# Patient Record
Sex: FEMALE | Race: WHITE | NOT HISPANIC OR LATINO | Employment: PART TIME | URBAN - METROPOLITAN AREA
[De-identification: names, ages, dates, MRNs, and addresses within clinical notes are randomized per-mention and may not be internally consistent; named-entity substitution may affect disease eponyms.]

---

## 2023-03-17 PROBLEM — L30.9 ECZEMA: Status: ACTIVE | Noted: 2023-03-17

## 2023-03-17 PROBLEM — R42 VERTIGO: Status: ACTIVE | Noted: 2023-03-17

## 2023-03-17 PROBLEM — M70.51 BURSITIS OF RIGHT KNEE: Status: ACTIVE | Noted: 2023-03-17

## 2023-03-17 PROBLEM — L40.9 PSORIASIS: Status: ACTIVE | Noted: 2023-03-17

## 2023-03-17 PROBLEM — K76.9 HEPATIC LESION: Status: ACTIVE | Noted: 2023-03-17

## 2023-03-17 PROBLEM — I10 HYPERTENSION: Status: ACTIVE | Noted: 2023-03-17

## 2023-03-17 PROBLEM — R76.12 POSITIVE QUANTIFERON-TB GOLD TEST: Status: ACTIVE | Noted: 2023-03-17

## 2023-03-17 PROBLEM — R53.81 MALAISE AND FATIGUE: Status: ACTIVE | Noted: 2023-03-17

## 2023-03-17 PROBLEM — K29.70 GASTRITIS: Status: ACTIVE | Noted: 2023-03-17

## 2023-03-17 PROBLEM — H66.90 OTITIS MEDIA: Status: ACTIVE | Noted: 2023-03-17

## 2023-03-17 PROBLEM — D70.9 NEUTROPENIA (CMS-HCC): Status: ACTIVE | Noted: 2023-03-17

## 2023-03-17 PROBLEM — R09.02 HYPOXEMIA: Status: ACTIVE | Noted: 2023-03-17

## 2023-03-17 PROBLEM — K21.9 GERD (GASTROESOPHAGEAL REFLUX DISEASE): Status: ACTIVE | Noted: 2023-03-17

## 2023-03-17 PROBLEM — J34.9 SINUS DISEASE: Status: ACTIVE | Noted: 2023-03-17

## 2023-03-17 PROBLEM — M72.2 PLANTAR FASCIITIS: Status: ACTIVE | Noted: 2023-03-17

## 2023-03-17 PROBLEM — H91.90 HEARING LOSS: Status: ACTIVE | Noted: 2023-03-17

## 2023-03-17 PROBLEM — E16.2 HYPOGLYCEMIA: Status: ACTIVE | Noted: 2023-03-17

## 2023-03-17 PROBLEM — I87.323 CHRONIC VENOUS HYPERTENSION WITH INFLAMMATION INVOLVING BOTH SIDES: Status: ACTIVE | Noted: 2023-03-17

## 2023-03-17 PROBLEM — S43.82XA: Status: ACTIVE | Noted: 2023-03-17

## 2023-03-17 PROBLEM — M65.331 TRIGGER MIDDLE FINGER OF RIGHT HAND: Status: ACTIVE | Noted: 2023-03-17

## 2023-03-17 PROBLEM — R10.9 RIGHT FLANK PAIN: Status: ACTIVE | Noted: 2023-03-17

## 2023-03-17 PROBLEM — G43.909 MIGRAINE HEADACHE: Status: ACTIVE | Noted: 2023-03-17

## 2023-03-17 PROBLEM — H90.3 ASYMMETRICAL SENSORINEURAL HEARING LOSS: Status: ACTIVE | Noted: 2023-03-17

## 2023-03-17 PROBLEM — M77.8 TENDONITIS OF RIGHT HAND: Status: ACTIVE | Noted: 2023-03-17

## 2023-03-17 PROBLEM — I49.9 CARDIAC ARRHYTHMIA: Status: ACTIVE | Noted: 2023-03-17

## 2023-03-17 PROBLEM — J02.9 SORE THROAT: Status: ACTIVE | Noted: 2023-03-17

## 2023-03-17 PROBLEM — Z86.79 HISTORY OF ATRIAL FIBRILLATION: Status: ACTIVE | Noted: 2023-03-17

## 2023-03-17 PROBLEM — R10.9 ABDOMINAL PAIN: Status: ACTIVE | Noted: 2023-03-17

## 2023-03-17 PROBLEM — D64.9 ANEMIA: Status: ACTIVE | Noted: 2023-03-17

## 2023-03-17 PROBLEM — R39.89 POSSIBLE URINARY TRACT INFECTION: Status: ACTIVE | Noted: 2023-03-17

## 2023-03-17 PROBLEM — R10.13 EPIGASTRIC PAIN: Status: ACTIVE | Noted: 2023-03-17

## 2023-03-17 PROBLEM — F32.A ANXIETY AND DEPRESSION: Status: ACTIVE | Noted: 2023-03-17

## 2023-03-17 PROBLEM — M79.644 PAIN IN FINGER OF RIGHT HAND: Status: ACTIVE | Noted: 2023-03-17

## 2023-03-17 PROBLEM — E66.811 CLASS 1 OBESITY WITH BODY MASS INDEX (BMI) OF 34.0 TO 34.9 IN ADULT: Status: ACTIVE | Noted: 2023-03-17

## 2023-03-17 PROBLEM — M65.331 ACQUIRED TRIGGER FINGER OF RIGHT MIDDLE FINGER: Status: ACTIVE | Noted: 2023-03-17

## 2023-03-17 PROBLEM — I87.2 VENOUS STASIS DERMATITIS OF RIGHT LOWER EXTREMITY: Status: ACTIVE | Noted: 2023-03-17

## 2023-03-17 PROBLEM — E88.89 STEATOSIS (MULTI): Status: ACTIVE | Noted: 2023-03-17

## 2023-03-17 PROBLEM — M54.16 LUMBAR RADICULOPATHY: Status: ACTIVE | Noted: 2023-03-17

## 2023-03-17 PROBLEM — R53.83 FATIGUE: Status: ACTIVE | Noted: 2023-03-17

## 2023-03-17 PROBLEM — R41.3 MEMORY CHANGES: Status: ACTIVE | Noted: 2023-03-17

## 2023-03-17 PROBLEM — M17.12 OSTEOARTHRITIS OF LEFT KNEE: Status: ACTIVE | Noted: 2023-03-17

## 2023-03-17 PROBLEM — H81.09 MENIERE SYNDROME: Status: ACTIVE | Noted: 2023-03-17

## 2023-03-17 PROBLEM — M54.10: Status: ACTIVE | Noted: 2023-03-17

## 2023-03-17 PROBLEM — E66.9 CLASS 1 OBESITY WITH BODY MASS INDEX (BMI) OF 34.0 TO 34.9 IN ADULT: Status: ACTIVE | Noted: 2023-03-17

## 2023-03-17 PROBLEM — I87.2 CHRONIC STASIS DERMATITIS: Status: ACTIVE | Noted: 2023-03-17

## 2023-03-17 PROBLEM — F41.9 ANXIETY AND DEPRESSION: Status: ACTIVE | Noted: 2023-03-17

## 2023-03-17 PROBLEM — E78.5 HYPERLIPIDEMIA: Status: ACTIVE | Noted: 2023-03-17

## 2023-03-17 PROBLEM — R53.83 MALAISE AND FATIGUE: Status: ACTIVE | Noted: 2023-03-17

## 2023-03-17 PROBLEM — F41.8 ANTICIPATORY ANXIETY: Status: ACTIVE | Noted: 2023-03-17

## 2023-03-17 PROBLEM — K30 INDIGESTION: Status: ACTIVE | Noted: 2023-03-17

## 2023-03-17 PROBLEM — K76.0 FATTY LIVER DISEASE, NONALCOHOLIC: Status: ACTIVE | Noted: 2023-03-17

## 2023-03-17 PROBLEM — R60.0 EDEMA OF LEFT FOOT: Status: ACTIVE | Noted: 2023-03-17

## 2023-03-17 PROBLEM — I51.7 ENLARGEMENT OF RIGHT ATRIUM: Status: ACTIVE | Noted: 2023-03-17

## 2023-03-17 PROBLEM — E55.9 VITAMIN D DEFICIENCY: Status: ACTIVE | Noted: 2023-03-17

## 2023-03-17 PROBLEM — M81.0 OSTEOPOROSIS: Status: ACTIVE | Noted: 2023-03-17

## 2023-03-17 PROBLEM — D17.1 LIPOMA OF ABDOMINAL WALL: Status: ACTIVE | Noted: 2023-03-17

## 2023-03-17 PROBLEM — R60.0 BILATERAL LEG EDEMA: Status: ACTIVE | Noted: 2023-03-17

## 2023-03-17 PROBLEM — I86.8 VARICOSE VEINS OF OTHER SPECIFIED SITES: Status: ACTIVE | Noted: 2023-03-17

## 2023-03-17 PROBLEM — M53.3 PAIN OF RIGHT SACROILIAC JOINT: Status: ACTIVE | Noted: 2023-03-17

## 2023-03-17 PROBLEM — L01.00 IMPETIGO: Status: ACTIVE | Noted: 2023-03-17

## 2023-03-17 RX ORDER — ACETAMINOPHEN 500 MG
1 TABLET ORAL DAILY
COMMUNITY

## 2023-03-17 RX ORDER — PHENOL/SODIUM PHENOLATE
20 AEROSOL, SPRAY (ML) MUCOUS MEMBRANE DAILY
COMMUNITY

## 2023-03-20 ENCOUNTER — TELEMEDICINE (OUTPATIENT)
Dept: PRIMARY CARE | Facility: CLINIC | Age: 73
End: 2023-03-20
Payer: MEDICARE

## 2023-03-20 DIAGNOSIS — J02.9 SORE THROAT: Primary | ICD-10-CM

## 2023-03-20 PROCEDURE — 99212 OFFICE O/P EST SF 10 MIN: CPT | Performed by: INTERNAL MEDICINE

## 2023-03-20 RX ORDER — AZITHROMYCIN 250 MG/1
TABLET, FILM COATED ORAL
Qty: 6 TABLET | Refills: 0 | Status: SHIPPED | OUTPATIENT
Start: 2023-03-20 | End: 2023-03-25

## 2023-03-20 ASSESSMENT — ENCOUNTER SYMPTOMS
TROUBLE SWALLOWING: 1
SINUS PAIN: 1
SINUS PRESSURE: 1
ACTIVITY CHANGE: 0
SORE THROAT: 1
COUGH: 1

## 2023-03-20 NOTE — PROGRESS NOTES
Subjective     Patient is evaluated Via Telemedicine.  Chief complaint is sore throat, runny nose, fatigue and malaise.  Has been sick for less than 1 week.    HPI  This is a 72 years old Brazilian-speaking lady with medical history of hyperlipidemia, degenerative joint disease, gastroesophageal reflux disease, hearing loss, carpal tunnel syndrome, psoriasis, history of latent TB, treated with INH.  Patient is evaluated via telemedicine.  Has been having fever, chills.  Patient's daughter has been sick also.  Has cough, sore throat.  Patient started to have nasal discharge yellow color, congestion.     Has H of  latent TB in February 2022, treated with INH.      Review of Systems   Constitutional:  Negative for activity change.   HENT:  Positive for postnasal drip, sinus pressure, sinus pain, sore throat and trouble swallowing.    Respiratory:  Positive for cough.        Objective      There were no vitals filed for this visit.     Physical Exam  There are no diagnoses linked to this encounter.        - Sinusitis.  Take Tylenol as needed.  Plenty of fluids.  Start on Z pack.    -Hyperlipidemia.  Keep Mediterranean diet, exercise, weight loss.  Repeat lipid panel today.  Last CT cardiac score 2017, was 2.    -Gastroesophageal reflux disease.  Eat small portions.  Avoid Caffeine, spicy foods, chocolate, alcohol.  Do not wear tight clothes.  Keep last meal before bed time > 3 hours.  Keep head side of the bed elevated at all time.    - Hyperlipidemia.  Diet, exercise.  Keep Mediterranean diet.    -Degenerative joint disease.  Arthralgia.  Take Tylenol as needed.  Exercise.    -History of latent TB.  Treated with INH.    -Health maintenance.  Return for Medicare wellness annual exam.  Blood work is today.  Mammography, colonoscopy.  See ophthalmology.    - Class 1 Obesity with 34.11.  Diet, exercise.  Keep ideal BMI less than 25.    This visit was completed via Telemedicine due to the restrictions of the COVID -19  pandemic.  All issues as above were discussed and addressed, but no physical exam was performed.  If it was felt, that the patient should be evaluated in clinic, then they were directed there.  The patient verbally consented to visit.       Total time spent with patient is greater than 10 minutes, more than 50% of the time is spent in direct patient telemedicine, patient consultation and coordination of care.  Patient voiced a full understanding of all treatment plans.  All patient questions has been answered to patient's satisfaction.          Teresa Swenson MD

## 2023-05-19 ENCOUNTER — APPOINTMENT (OUTPATIENT)
Dept: PRIMARY CARE | Facility: CLINIC | Age: 73
End: 2023-05-19
Payer: MEDICARE

## 2023-05-22 ENCOUNTER — OFFICE VISIT (OUTPATIENT)
Dept: PRIMARY CARE | Facility: CLINIC | Age: 73
End: 2023-05-22
Payer: MEDICARE

## 2023-05-22 VITALS
SYSTOLIC BLOOD PRESSURE: 122 MMHG | TEMPERATURE: 96.8 F | RESPIRATION RATE: 16 BRPM | WEIGHT: 205 LBS | BODY MASS INDEX: 34.16 KG/M2 | HEIGHT: 65 IN | DIASTOLIC BLOOD PRESSURE: 62 MMHG | HEART RATE: 62 BPM

## 2023-05-22 DIAGNOSIS — R05.9 COUGH, UNSPECIFIED TYPE: Primary | ICD-10-CM

## 2023-05-22 PROCEDURE — 1160F RVW MEDS BY RX/DR IN RCRD: CPT | Performed by: INTERNAL MEDICINE

## 2023-05-22 PROCEDURE — 99212 OFFICE O/P EST SF 10 MIN: CPT | Performed by: INTERNAL MEDICINE

## 2023-05-22 PROCEDURE — 1036F TOBACCO NON-USER: CPT | Performed by: INTERNAL MEDICINE

## 2023-05-22 PROCEDURE — 1159F MED LIST DOCD IN RCRD: CPT | Performed by: INTERNAL MEDICINE

## 2023-05-22 PROCEDURE — 3074F SYST BP LT 130 MM HG: CPT | Performed by: INTERNAL MEDICINE

## 2023-05-22 PROCEDURE — 3078F DIAST BP <80 MM HG: CPT | Performed by: INTERNAL MEDICINE

## 2023-05-22 RX ORDER — DOXYCYCLINE 100 MG/1
100 CAPSULE ORAL 2 TIMES DAILY
Qty: 14 CAPSULE | Refills: 0 | Status: SHIPPED | OUTPATIENT
Start: 2023-05-22 | End: 2023-05-29

## 2023-05-22 ASSESSMENT — ENCOUNTER SYMPTOMS
CHEST TIGHTNESS: 0
APNEA: 0
LOSS OF SENSATION IN FEET: 0
SHORTNESS OF BREATH: 0
APPETITE CHANGE: 0
OCCASIONAL FEELINGS OF UNSTEADINESS: 0
ACTIVITY CHANGE: 0
COUGH: 1
DEPRESSION: 0
FATIGUE: 1
SORE THROAT: 1

## 2023-05-22 ASSESSMENT — PATIENT HEALTH QUESTIONNAIRE - PHQ9
SUM OF ALL RESPONSES TO PHQ9 QUESTIONS 1 AND 2: 0
1. LITTLE INTEREST OR PLEASURE IN DOING THINGS: NOT AT ALL
2. FEELING DOWN, DEPRESSED OR HOPELESS: NOT AT ALL

## 2023-05-22 ASSESSMENT — COLUMBIA-SUICIDE SEVERITY RATING SCALE - C-SSRS
2. HAVE YOU ACTUALLY HAD ANY THOUGHTS OF KILLING YOURSELF?: NO
6. HAVE YOU EVER DONE ANYTHING, STARTED TO DO ANYTHING, OR PREPARED TO DO ANYTHING TO END YOUR LIFE?: NO
1. IN THE PAST MONTH, HAVE YOU WISHED YOU WERE DEAD OR WISHED YOU COULD GO TO SLEEP AND NOT WAKE UP?: NO

## 2023-05-22 ASSESSMENT — PAIN SCALES - GENERAL: PAINLEVEL: 0-NO PAIN

## 2023-05-22 NOTE — PROGRESS NOTES
Subjective    Blanquita Morales is a 72 y.o. female who presents for Chief complaint of cough.  Has cough with green sputum production.    HPI    This is a 72 years old Jordanian-speaking lady with medical history of hyperlipidemia, degenerative joint disease, gastroesophageal reflux disease, hearing loss, carpal tunnel syndrome, psoriasis, history of latent TB, treated with INH.  Patient is  presented for evaluation and treatment of the above complaints.  Has been having sore throat, cough with green sputum production.  Patient has been sick for 1 month.  Has had fever, chills episodes.  Denies to have chest pain, Sob, dyspnea. Headaches.   Has H of  latent TB in February 2022, treated with INH.    Review of Systems   Constitutional:  Positive for fatigue. Negative for activity change and appetite change.   HENT:  Positive for sore throat.    Respiratory:  Positive for cough. Negative for apnea, chest tightness and shortness of breath.        Objective        Vitals:    05/22/23 1609   Temp: 36 °C (96.8 °F)        Physical Exam  Constitutional:       Appearance: Normal appearance.   HENT:      Head: Normocephalic.      Nose: Nose normal.      Mouth/Throat:      Mouth: Mucous membranes are dry.   Cardiovascular:      Rate and Rhythm: Normal rate and regular rhythm.   Pulmonary:      Effort: Pulmonary effort is normal.      Breath sounds: Normal breath sounds.   Abdominal:      Palpations: Abdomen is soft.   Musculoskeletal:         General: Normal range of motion.   Skin:     General: Skin is warm.   Neurological:      Mental Status: She is alert and oriented to person, place, and time.   Psychiatric:         Mood and Affect: Mood normal.       Diagnoses and all orders for this visit:  Cough, unspecified type (Primary)  -     doxycycline (Vibramycin) 100 mg capsule; Take 1 capsule (100 mg) by mouth 2 times a day for 7 days. Take with at least 8 ounces (large glass) of water, do not lie down for 30 minutes after  -     XR  chest 2 views; Future      -Hyperlipidemia.  Keep Mediterranean diet, exercise, weight loss.  Repeat lipid panel today.  Last CT cardiac score 2017, was 2.     -Gastroesophageal reflux disease.  Eat small portions.  Avoid Caffeine, spicy foods, chocolate, alcohol.  Do not wear tight clothes.  Keep last meal before bed time > 3 hours.  Keep head side of the bed elevated at all time.     - Hyperlipidemia.  Diet, exercise.  Keep Mediterranean diet.     -Degenerative joint disease.  Arthralgia.  Take Tylenol as needed.  Exercise.     -History of latent TB.  Treated with INH.     -Health maintenance.  Return for Medicare wellness annual exam.   Declined to have DEXA scan, colonoscopy.   Due to mammography.     - Class 1 Obesity with 34.11.  Diet, exercise.  Keep ideal BMI less than 25.      Teresa Swenson MD

## 2023-06-14 ENCOUNTER — APPOINTMENT (OUTPATIENT)
Dept: PRIMARY CARE | Facility: CLINIC | Age: 73
End: 2023-06-14
Payer: MEDICARE

## 2023-07-17 ENCOUNTER — OFFICE VISIT (OUTPATIENT)
Dept: PRIMARY CARE | Facility: CLINIC | Age: 73
End: 2023-07-17
Payer: MEDICARE

## 2023-07-17 VITALS
BODY MASS INDEX: 33.32 KG/M2 | WEIGHT: 200 LBS | HEART RATE: 62 BPM | SYSTOLIC BLOOD PRESSURE: 118 MMHG | HEIGHT: 65 IN | TEMPERATURE: 97.5 F | DIASTOLIC BLOOD PRESSURE: 76 MMHG | RESPIRATION RATE: 16 BRPM

## 2023-07-17 DIAGNOSIS — R39.15 URGENCY OF URINATION: Primary | ICD-10-CM

## 2023-07-17 LAB
APPEARANCE UR: CLEAR
BILIRUB UR QL STRIP: NEGATIVE
COLOR UR: YELLOW
GLUCOSE UR STRIP-MCNC: NEGATIVE MG/DL
HGB UR QL STRIP: ABNORMAL
KETONES UR STRIP-MCNC: ABNORMAL MG/DL
LEUKOCYTE ESTERASE UR QL STRIP: ABNORMAL
NITRITE UR QL STRIP: POSITIVE
PH UR STRIP: 5.5 [PH]
PROT UR STRIP-MCNC: ABNORMAL MG/DL
SP GR UR STRIP.AUTO: >=1.03
UROBILINOGEN UR STRIP-ACNC: 0.2 E.U./DL

## 2023-07-17 PROCEDURE — 81003 URINALYSIS AUTO W/O SCOPE: CPT | Performed by: INTERNAL MEDICINE

## 2023-07-17 PROCEDURE — 1159F MED LIST DOCD IN RCRD: CPT | Performed by: INTERNAL MEDICINE

## 2023-07-17 PROCEDURE — 3074F SYST BP LT 130 MM HG: CPT | Performed by: INTERNAL MEDICINE

## 2023-07-17 PROCEDURE — 99212 OFFICE O/P EST SF 10 MIN: CPT | Performed by: INTERNAL MEDICINE

## 2023-07-17 PROCEDURE — 1160F RVW MEDS BY RX/DR IN RCRD: CPT | Performed by: INTERNAL MEDICINE

## 2023-07-17 PROCEDURE — 87086 URINE CULTURE/COLONY COUNT: CPT

## 2023-07-17 PROCEDURE — 1126F AMNT PAIN NOTED NONE PRSNT: CPT | Performed by: INTERNAL MEDICINE

## 2023-07-17 PROCEDURE — 1036F TOBACCO NON-USER: CPT | Performed by: INTERNAL MEDICINE

## 2023-07-17 PROCEDURE — 3078F DIAST BP <80 MM HG: CPT | Performed by: INTERNAL MEDICINE

## 2023-07-17 PROCEDURE — 87186 SC STD MICRODIL/AGAR DIL: CPT

## 2023-07-17 PROCEDURE — 87077 CULTURE AEROBIC IDENTIFY: CPT

## 2023-07-17 RX ORDER — CIPROFLOXACIN 500 MG/1
500 TABLET ORAL 2 TIMES DAILY
Qty: 14 TABLET | Refills: 0 | Status: SHIPPED | OUTPATIENT
Start: 2023-07-17 | End: 2023-07-28 | Stop reason: WASHOUT

## 2023-07-17 ASSESSMENT — PAIN SCALES - GENERAL: PAINLEVEL: 0-NO PAIN

## 2023-07-17 ASSESSMENT — ENCOUNTER SYMPTOMS
FREQUENCY: 1
DYSURIA: 1
ACTIVITY CHANGE: 1
DIFFICULTY URINATING: 1

## 2023-07-17 NOTE — PROGRESS NOTES
Subjective    Blanquita Morales is a 73 y.o. female who presents for  urgency, frequency.  Had fever.    HPI    This is a 73 years old [place]-speaking lady with medical history of hyperlipidemia, degenerative joint disease, gastroesophageal reflux disease, hearing loss, carpal tunnel syndrome, psoriasis, history of latent TB, treated with INH.  Patient is  presented for evaluation and treatment of the above complaints.   Developed urgency, frequency, dysuria.    Denies to have chest pain, Sob, dyspnea. Headaches.   Has H of  latent TB in February 2022, treated with INH.    Review of Systems   Constitutional:  Positive for activity change.   Genitourinary:  Positive for difficulty urinating, dysuria and frequency.       Objective        Vitals:    07/17/23 1012   BP: 118/76   Pulse: 62   Resp: 16   Temp: 36.4 °C (97.5 °F)        Physical Exam  HENT:      Head: Normocephalic.      Nose: Nose normal.      Mouth/Throat:      Mouth: Mucous membranes are moist.   Cardiovascular:      Rate and Rhythm: Normal rate.   Pulmonary:      Effort: Pulmonary effort is normal.   Abdominal:      Palpations: Abdomen is soft.   Musculoskeletal:         General: Normal range of motion.   Skin:     General: Skin is warm.   Neurological:      General: No focal deficit present.      Mental Status: She is alert.       Diagnoses and all orders for this visit:  Urgency of urination (Primary)  -     POCT UA (Automated) docked device  -     POCT UA (Automated) docked device  -     ciprofloxacin (Cipro) 500 mg tablet; Take 1 tablet (500 mg) by mouth 2 times a day for 7 days.  -     Urine Culture  Other orders  -     POCT URINALYSIS AUTOMATED       Hyperlipidemia.  Keep Mediterranean diet, exercise, weight loss.  Repeat lipid panel today.  Last CT cardiac score 2017, was 2.     -Gastroesophageal reflux disease.  Eat small portions.  Avoid Caffeine, spicy foods, chocolate, alcohol.  Do not wear tight clothes.  Keep last meal before bed time > 3  hours.  Keep head side of the bed elevated at all time.     - Hyperlipidemia.  Diet, exercise.  Keep Mediterranean diet.     -Degenerative joint disease.  Arthralgia.  Take Tylenol as needed.  Exercise.     -History of latent TB.  Treated with INH.     -Health maintenance.  Return for Medicare wellness annual exam.   Declined to have DEXA scan, colonoscopy.   Due to mammography.     - Class 1 Obesity with 33.28.  Diet, exercise.  Keep ideal BMI less than 25.      Teresa Swenson MD

## 2023-07-19 LAB — URINE CULTURE: ABNORMAL

## 2023-07-24 ENCOUNTER — APPOINTMENT (OUTPATIENT)
Dept: PRIMARY CARE | Facility: CLINIC | Age: 73
End: 2023-07-24
Payer: MEDICARE

## 2023-07-28 ENCOUNTER — OFFICE VISIT (OUTPATIENT)
Dept: PRIMARY CARE | Facility: CLINIC | Age: 73
End: 2023-07-28
Payer: MEDICARE

## 2023-07-28 VITALS
WEIGHT: 202 LBS | RESPIRATION RATE: 16 BRPM | HEIGHT: 65 IN | SYSTOLIC BLOOD PRESSURE: 102 MMHG | DIASTOLIC BLOOD PRESSURE: 74 MMHG | HEART RATE: 62 BPM | BODY MASS INDEX: 33.66 KG/M2 | TEMPERATURE: 97.5 F

## 2023-07-28 DIAGNOSIS — Z12.11 ENCOUNTER FOR SCREENING FOR MALIGNANT NEOPLASM OF COLON: ICD-10-CM

## 2023-07-28 DIAGNOSIS — Z00.00 ROUTINE GENERAL MEDICAL EXAMINATION AT HEALTH CARE FACILITY: Primary | ICD-10-CM

## 2023-07-28 DIAGNOSIS — Z12.31 ENCOUNTER FOR SCREENING MAMMOGRAM FOR MALIGNANT NEOPLASM OF BREAST: ICD-10-CM

## 2023-07-28 DIAGNOSIS — Z78.0 ASYMPTOMATIC MENOPAUSAL STATE: ICD-10-CM

## 2023-07-28 PROCEDURE — 1160F RVW MEDS BY RX/DR IN RCRD: CPT | Performed by: INTERNAL MEDICINE

## 2023-07-28 PROCEDURE — 3078F DIAST BP <80 MM HG: CPT | Performed by: INTERNAL MEDICINE

## 2023-07-28 PROCEDURE — 1159F MED LIST DOCD IN RCRD: CPT | Performed by: INTERNAL MEDICINE

## 2023-07-28 PROCEDURE — 3074F SYST BP LT 130 MM HG: CPT | Performed by: INTERNAL MEDICINE

## 2023-07-28 PROCEDURE — 1036F TOBACCO NON-USER: CPT | Performed by: INTERNAL MEDICINE

## 2023-07-28 PROCEDURE — G0439 PPPS, SUBSEQ VISIT: HCPCS | Performed by: INTERNAL MEDICINE

## 2023-07-28 PROCEDURE — 1126F AMNT PAIN NOTED NONE PRSNT: CPT | Performed by: INTERNAL MEDICINE

## 2023-07-28 PROCEDURE — 1170F FXNL STATUS ASSESSED: CPT | Performed by: INTERNAL MEDICINE

## 2023-07-28 ASSESSMENT — ACTIVITIES OF DAILY LIVING (ADL)
FEEDING YOURSELF: INDEPENDENT
USING TELEPHONE: INDEPENDENT
WALKS IN HOME: INDEPENDENT
TOILETING: INDEPENDENT
PREPARING MEALS: INDEPENDENT
HEARING - RIGHT EAR: HEARING AID
USING TRANSPORTATION: INDEPENDENT
GROOMING: INDEPENDENT
EATING: INDEPENDENT
JUDGMENT_ADEQUATE_SAFELY_COMPLETE_DAILY_ACTIVITIES: YES
HEARING - LEFT EAR: HEARING AID
ADEQUATE_TO_COMPLETE_ADL: YES
STIL DRIVING: YES
ADEQUATE_TO_COMPLETE_ADL: YES
BATHING: INDEPENDENT
JUDGMENT_ADEQUATE_SAFELY_COMPLETE_DAILY_ACTIVITIES: YES
BATHING: INDEPENDENT
TAKING MEDICATION: INDEPENDENT
PILL BOX USED: YES
GROCERY SHOPPING: INDEPENDENT
DOING HOUSEWORK: INDEPENDENT
DRESSING YOURSELF: INDEPENDENT
PATIENT'S MEMORY ADEQUATE TO SAFELY COMPLETE DAILY ACTIVITIES?: YES
NEEDS ASSISTANCE WITH FOOD: INDEPENDENT
DRESSING: INDEPENDENT
TOILETING: INDEPENDENT
MANAGING FINANCES: INDEPENDENT
FEEDING: INDEPENDENT

## 2023-07-28 ASSESSMENT — ENCOUNTER SYMPTOMS
BACK PAIN: 0
HEADACHES: 0
LOSS OF SENSATION IN FEET: 0
ACTIVITY CHANGE: 0
OCCASIONAL FEELINGS OF UNSTEADINESS: 0
LIGHT-HEADEDNESS: 0
ARTHRALGIAS: 1
APPETITE CHANGE: 0
ABDOMINAL DISTENTION: 0
DEPRESSION: 0

## 2023-07-28 ASSESSMENT — COLUMBIA-SUICIDE SEVERITY RATING SCALE - C-SSRS
1. IN THE PAST MONTH, HAVE YOU WISHED YOU WERE DEAD OR WISHED YOU COULD GO TO SLEEP AND NOT WAKE UP?: NO
6. HAVE YOU EVER DONE ANYTHING, STARTED TO DO ANYTHING, OR PREPARED TO DO ANYTHING TO END YOUR LIFE?: NO
2. HAVE YOU ACTUALLY HAD ANY THOUGHTS OF KILLING YOURSELF?: NO

## 2023-07-28 ASSESSMENT — ANXIETY QUESTIONNAIRES
1. FEELING NERVOUS, ANXIOUS, OR ON EDGE: NOT AT ALL
6. BECOMING EASILY ANNOYED OR IRRITABLE: NOT AT ALL
IF YOU CHECKED OFF ANY PROBLEMS ON THIS QUESTIONNAIRE, HOW DIFFICULT HAVE THESE PROBLEMS MADE IT FOR YOU TO DO YOUR WORK, TAKE CARE OF THINGS AT HOME, OR GET ALONG WITH OTHER PEOPLE: SOMEWHAT DIFFICULT
2. NOT BEING ABLE TO STOP OR CONTROL WORRYING: NOT AT ALL
5. BEING SO RESTLESS THAT IT IS HARD TO SIT STILL: NOT AT ALL
GAD7 TOTAL SCORE: 0
7. FEELING AFRAID AS IF SOMETHING AWFUL MIGHT HAPPEN: NOT AT ALL
3. WORRYING TOO MUCH ABOUT DIFFERENT THINGS: NOT AT ALL
4. TROUBLE RELAXING: NOT AT ALL

## 2023-07-28 ASSESSMENT — PATIENT HEALTH QUESTIONNAIRE - PHQ9
SUM OF ALL RESPONSES TO PHQ9 QUESTIONS 1 AND 2: 0
2. FEELING DOWN, DEPRESSED OR HOPELESS: NOT AT ALL
1. LITTLE INTEREST OR PLEASURE IN DOING THINGS: NOT AT ALL

## 2023-07-28 ASSESSMENT — GERIATRIC MINI NUTRITIONAL ASSESSMENT (MNA)
B WEIGHT LOSS DURING THE LAST 3 MONTHS: NO WEIGHT LOSS
A HAS FOOD INTAKE DECLINED OVER THE PAST 3 MONTHS DUE TO LOSS OF APPETITE, DIGESTIVE PROBLEMS, CHEWING OR SWALLOWING DIFFICULTIES?: NO DECREASE IN FOOD INTAKE
E NEUROPSYCHOLOGICAL PROBLEMS: NO PSYCHOLOGICAL PROBLEMS

## 2023-07-28 NOTE — PROGRESS NOTES
Subjective    Blanquita Morales is a 73 y.o. female who presents for  Medicare Wellness Annual Exam.    HPI    This is a 73 years old Czech-speaking lady with medical history of hyperlipidemia, degenerative joint disease, gastroesophageal reflux disease, hearing loss, carpal tunnel syndrome, psoriasis, history of latent TB, treated with INH.  Patient is  presented for Medicare Wellness exam.  Recovered from UTI .  Feeling much better.   Has H of  latent TB in February 2022, treated with INH.    Review of Systems   Constitutional:  Negative for activity change and appetite change.   Gastrointestinal:  Negative for abdominal distention.   Musculoskeletal:  Positive for arthralgias. Negative for back pain.   Neurological:  Negative for light-headedness and headaches.       Objective        Vitals:    07/28/23 0926   BP: 102/74   Pulse: 62   Resp: 16   Temp: 36.4 °C (97.5 °F)        Physical Exam  Constitutional:       Appearance: Normal appearance.   HENT:      Head: Normocephalic and atraumatic.      Right Ear: External ear normal.      Left Ear: External ear normal.      Nose: Nose normal.      Mouth/Throat:      Mouth: Mucous membranes are moist.   Eyes:      Extraocular Movements: Extraocular movements intact.      Conjunctiva/sclera: Conjunctivae normal.      Pupils: Pupils are equal, round, and reactive to light.   Cardiovascular:      Rate and Rhythm: Normal rate.      Pulses: Normal pulses.      Heart sounds: Normal heart sounds.   Pulmonary:      Effort: Pulmonary effort is normal.      Breath sounds: Normal breath sounds.   Abdominal:      General: Abdomen is flat. Bowel sounds are normal.      Palpations: Abdomen is soft.   Musculoskeletal:         General: Normal range of motion.      Cervical back: Normal range of motion and neck supple.   Skin:     General: Skin is warm and dry.   Neurological:      General: No focal deficit present.      Mental Status: She is alert.   Psychiatric:         Mood and Affect:  Mood normal.         Behavior: Behavior normal.         Thought Content: Thought content normal.         Judgment: Judgment normal.       Diagnoses and all orders for this visit:  Routine general medical examination at health care facility (Primary)  -     1 Year Follow Up In Primary Care - Wellness Exam; Future  Asymptomatic menopausal state  -     XR DEXA bone density; Future  Encounter for screening mammogram for malignant neoplasm of breast  -     BI mammo bilateral screening tomosynthesis; Future  Encounter for screening for malignant neoplasm of colon  -     Cologuard® colon cancer screening; Future  -     Cologuard® colon cancer screening     - S/p UTI.  Treated with Cipro.  Recovered.    -Hyperlipidemia.  Keep Mediterranean diet, exercise, weight loss.  Repeat lipid panel today.  Last CT cardiac score 2017, was 2.     -Gastroesophageal reflux disease.  Eat small portions.  Avoid Caffeine, spicy foods, chocolate, alcohol.  Do not wear tight clothes.  Keep last meal before bed time > 3 hours.  Keep head side of the bed elevated at all time.     - Hyperlipidemia.  Diet, exercise.  Keep Mediterranean diet.     -Degenerative joint disease.  Arthralgia.  Take Tylenol as needed.  Exercise.     -History of latent TB.  Treated with INH.     -Health maintenance.  Medicare wellness annual exam is today.  DEXA scan, cologuard is ordered.   Referral for mammography.     - Class 1 Obesity with 33.61.  Diet, exercise.  Keep ideal BMI less than 25.      Teresa Swenson MD

## 2023-08-03 DIAGNOSIS — R92.8 ABNORMAL MAMMOGRAPHY: Primary | ICD-10-CM

## 2023-08-12 LAB — NONINV COLON CA DNA+OCC BLD SCRN STL QL: NEGATIVE

## 2023-10-20 ENCOUNTER — HOSPITAL ENCOUNTER (OUTPATIENT)
Dept: RADIOLOGY | Facility: HOSPITAL | Age: 73
Discharge: HOME | End: 2023-10-20
Payer: MEDICARE

## 2023-10-20 ENCOUNTER — OFFICE VISIT (OUTPATIENT)
Dept: ORTHOPEDIC SURGERY | Facility: HOSPITAL | Age: 73
End: 2023-10-20
Payer: MEDICARE

## 2023-10-20 VITALS — WEIGHT: 200 LBS | BODY MASS INDEX: 31.39 KG/M2 | HEIGHT: 67 IN

## 2023-10-20 DIAGNOSIS — M17.12 PRIMARY OSTEOARTHRITIS OF LEFT KNEE: ICD-10-CM

## 2023-10-20 DIAGNOSIS — S86.112A GASTROCNEMIUS STRAIN, LEFT, INITIAL ENCOUNTER: Primary | ICD-10-CM

## 2023-10-20 PROCEDURE — 1036F TOBACCO NON-USER: CPT | Performed by: FAMILY MEDICINE

## 2023-10-20 PROCEDURE — 1160F RVW MEDS BY RX/DR IN RCRD: CPT | Performed by: FAMILY MEDICINE

## 2023-10-20 PROCEDURE — 99203 OFFICE O/P NEW LOW 30 MIN: CPT | Performed by: FAMILY MEDICINE

## 2023-10-20 PROCEDURE — 1159F MED LIST DOCD IN RCRD: CPT | Performed by: FAMILY MEDICINE

## 2023-10-20 PROCEDURE — 1126F AMNT PAIN NOTED NONE PRSNT: CPT | Performed by: FAMILY MEDICINE

## 2023-10-20 PROCEDURE — 73564 X-RAY EXAM KNEE 4 OR MORE: CPT | Mod: LT

## 2023-10-20 PROCEDURE — 73564 X-RAY EXAM KNEE 4 OR MORE: CPT | Mod: LEFT SIDE | Performed by: RADIOLOGY

## 2023-10-20 PROCEDURE — 99213 OFFICE O/P EST LOW 20 MIN: CPT | Performed by: FAMILY MEDICINE

## 2023-10-20 ASSESSMENT — PAIN SCALES - GENERAL: PAINLEVEL_OUTOF10: 10 - WORST POSSIBLE PAIN

## 2023-10-20 ASSESSMENT — PAIN - FUNCTIONAL ASSESSMENT: PAIN_FUNCTIONAL_ASSESSMENT: 0-10

## 2023-10-20 NOTE — PROGRESS NOTES
Patient ID: Blanquita Morales is a 73 y.o. female.    Patient is here for left knee pain ongoing for the past month not done anything for it just rest no meds     History of Present Illness:  Encounter date: 10/20/2023  Blanquita Morales is a 73 y.o. [place] female working one day/week at local [email or web address] who presents today for evaluation of left knee/calf pain. Her daughter is present with her who provided translation. She states she has had pain in posterior knee radiating down her left calf for roughly one month. States pain is 8/10-10/10. Denies any similar pain previously or any known injury/trauma. She is relatively active, but pain has limited her activity recently. She denies any significant swelling, bruising, numbness, tingling, or weakness. Also denies any low back pain, hip pain, or pain in foot. She does not take any medications for her pain as she did not respond well to Tylenol previously and cannot take NSAID's due to history of anemia. She wears compression stockings regularly without significant improvement. Denies any other complaints at this time.    Physical Exam:  The LEFT knee is without obvious signs of acute bony deformity, swelling, erythema, ecchymosis or joint effusion. The patella is without tenderness. Apprehension is negative with medial and lateral glide. Patella crepitus is positive. Patella grind is negative. The medial joint line is tender and without bony crepitus or step-off. The lateral joint line is slightly tender and without bony crepitus or step-off. Flexion & extension are full and symmetrical. Varus & valgus stress test at 0° and 30° of flexion, Lachman's, Oksana's, and posterior drawer are all negative. There is tenderness to palpation throughout medial head of gastrocnemius more significant than joint line palpation. The opposite knee is nontender and stable. Gait is slightly antalgic and tandem.     Imaging:   Radiographs of the left knee obtained today were reviewed and  revealed moderate DJD without any acute osseous abnormalities.  The studies were reviewed with Dr. Cooper personally in the office today.    Problem List Items Addressed This Visit             ICD-10-CM    Osteoarthritis of left knee M17.12    Relevant Orders    XR knee left 4+ views    Point of Care Ultrasound (Completed)    Gastrocnemius strain, left, initial encounter - Primary S86.112A    Relevant Orders    PT eval and treat     Patient Discussion/Summary  We reviewed the exam and x-ray findings and discussed the conservative and surgical treatment options. We agreed to conservative management for suspected strain of medial left gastrocnemius. She does have some tenderness at left knee joint lines with DJD of left knee, but tenderness most severe over medial head of gastrocnemius. She may try applying Voltaren gel three times daily as needed over area of pain in addition to ice/heat as needed for relief. Discussed she may also try Tylenol, but she prefers not to take oral medications for her pain. ACE wrap applied to allow compression of gastrocnemius. Physical therapy referral provided today as well. She should follow-up in 4-5 weeks or sooner as needed.     **This note was dictated using Dragon speech recognition software and was not corrected for spelling or grammatical errors**

## 2023-10-24 ENCOUNTER — OFFICE VISIT (OUTPATIENT)
Dept: PRIMARY CARE | Facility: CLINIC | Age: 73
End: 2023-10-24
Payer: MEDICARE

## 2023-10-24 VITALS
TEMPERATURE: 96.9 F | WEIGHT: 200 LBS | DIASTOLIC BLOOD PRESSURE: 62 MMHG | BODY MASS INDEX: 31.32 KG/M2 | HEART RATE: 64 BPM | RESPIRATION RATE: 16 BRPM | SYSTOLIC BLOOD PRESSURE: 122 MMHG

## 2023-10-24 DIAGNOSIS — S86.112S GASTROCNEMIUS STRAIN, LEFT, SEQUELA: Primary | ICD-10-CM

## 2023-10-24 PROCEDURE — 1159F MED LIST DOCD IN RCRD: CPT | Performed by: INTERNAL MEDICINE

## 2023-10-24 PROCEDURE — 3074F SYST BP LT 130 MM HG: CPT | Performed by: INTERNAL MEDICINE

## 2023-10-24 PROCEDURE — 1125F AMNT PAIN NOTED PAIN PRSNT: CPT | Performed by: INTERNAL MEDICINE

## 2023-10-24 PROCEDURE — 3078F DIAST BP <80 MM HG: CPT | Performed by: INTERNAL MEDICINE

## 2023-10-24 PROCEDURE — 99213 OFFICE O/P EST LOW 20 MIN: CPT | Performed by: INTERNAL MEDICINE

## 2023-10-24 PROCEDURE — 1036F TOBACCO NON-USER: CPT | Performed by: INTERNAL MEDICINE

## 2023-10-24 PROCEDURE — 1160F RVW MEDS BY RX/DR IN RCRD: CPT | Performed by: INTERNAL MEDICINE

## 2023-10-24 ASSESSMENT — ENCOUNTER SYMPTOMS
FATIGUE: 1
APPETITE CHANGE: 0
ARTHRALGIAS: 1
ACTIVITY CHANGE: 1

## 2023-10-24 ASSESSMENT — PAIN SCALES - GENERAL: PAINLEVEL: 8

## 2023-10-24 NOTE — PROGRESS NOTES
Subjective    Blanquita Morales is a 73 y.o. female who presents for evaluation and treatment of  L knee, L leg pain.    HPI    This is a 73 years old Pakistani-speaking lady with medical history of hyperlipidemia, degenerative joint disease, gastroesophageal reflux disease, hearing loss, carpal tunnel syndrome, psoriasis, history of latent TB, treated with INH.  Patient is  presented for evaluation and treatment of the above complaints.  Has L knee area pain, has tenderness to palpation of muscles of  calf area.  X ray showed slight buckled appearance of the patellar tendon, partial distal quadriceps injury.   Has H of  latent TB in February 2022, treated with INH.    Review of Systems   Constitutional:  Positive for activity change and fatigue. Negative for appetite change.   Musculoskeletal:  Positive for arthralgias and gait problem.       Objective        Vitals:    10/24/23 1501   BP: 122/62   Pulse: 64   Resp: 16   Temp: 36.1 °C (96.9 °F)        Physical Exam  HENT:      Head: Normocephalic and atraumatic.      Mouth/Throat:      Mouth: Mucous membranes are moist.   Eyes:      Pupils: Pupils are equal, round, and reactive to light.   Cardiovascular:      Heart sounds: Normal heart sounds.   Pulmonary:      Breath sounds: Normal breath sounds.   Abdominal:      Palpations: Abdomen is soft.   Musculoskeletal:         General: Tenderness present.   Skin:     General: Skin is warm.   Neurological:      General: No focal deficit present.   Psychiatric:         Mood and Affect: Mood normal.       Diagnoses and all orders for this visit:  Gastrocnemius strain, left, sequela (Primary)   Followed by orthopedic surgery.  Take Tylenol as needed.  Starting PT in 1 week.  Evaluated by orthopedic surgery.     - H of  UTI.  Treated with Cipro.  Recovered.     -Hyperlipidemia.  Keep Mediterranean diet, exercise, weight loss.  Repeat lipid panel today.  Last CT cardiac score 2017, was 2.     -Gastroesophageal reflux disease.  Eat  small portions.  Avoid Caffeine, spicy foods, chocolate, alcohol.  Do not wear tight clothes.  Keep last meal before bed time > 3 hours.  Keep head side of the bed elevated at all time.     - Hyperlipidemia.  Diet, exercise.  Keep Mediterranean diet.     -Degenerative joint disease.  Arthralgia.  Take Tylenol as needed.  Exercise.     -History of latent TB.  Treated with INH.     -Health maintenance.  Medicare wellness annual exam is  up to date.  DEXA scan, cologuard is ordered.   Referral for mammography.     - Class 1 Obesity with 31.32.  Diet, exercise.  Keep ideal BMI less than 25.      Teresa Swenson MD

## 2023-10-31 ENCOUNTER — CLINICAL SUPPORT (OUTPATIENT)
Dept: PRIMARY CARE | Facility: CLINIC | Age: 73
End: 2023-10-31
Payer: MEDICARE

## 2023-10-31 DIAGNOSIS — M25.50 ARTHRALGIA, UNSPECIFIED JOINT: ICD-10-CM

## 2023-10-31 DIAGNOSIS — E16.2 HYPOGLYCEMIA: ICD-10-CM

## 2023-10-31 DIAGNOSIS — E78.5 HYPERLIPIDEMIA, UNSPECIFIED HYPERLIPIDEMIA TYPE: ICD-10-CM

## 2023-10-31 DIAGNOSIS — R53.83 OTHER FATIGUE: ICD-10-CM

## 2023-10-31 DIAGNOSIS — D50.9 IRON DEFICIENCY ANEMIA, UNSPECIFIED IRON DEFICIENCY ANEMIA TYPE: Primary | ICD-10-CM

## 2023-10-31 DIAGNOSIS — E53.8 VITAMIN B12 DEFICIENCY: ICD-10-CM

## 2023-10-31 DIAGNOSIS — E55.9 VITAMIN D DEFICIENCY: ICD-10-CM

## 2023-10-31 DIAGNOSIS — R73.9 HYPERGLYCEMIA: ICD-10-CM

## 2023-10-31 LAB
ERYTHROCYTE [SEDIMENTATION RATE] IN BLOOD BY WESTERGREN METHOD: 56 MM/H (ref 0–30)
RHEUMATOID FACT SER NEPH-ACNC: <10 IU/ML (ref 0–15)
URATE SERPL-MCNC: 4.3 MG/DL (ref 2.3–6.7)

## 2023-10-31 PROCEDURE — 85025 COMPLETE CBC W/AUTO DIFF WBC: CPT | Performed by: INTERNAL MEDICINE

## 2023-10-31 PROCEDURE — 86431 RHEUMATOID FACTOR QUANT: CPT

## 2023-10-31 PROCEDURE — 80053 COMPREHEN METABOLIC PANEL: CPT | Performed by: INTERNAL MEDICINE

## 2023-10-31 PROCEDURE — 82306 VITAMIN D 25 HYDROXY: CPT | Performed by: INTERNAL MEDICINE

## 2023-10-31 PROCEDURE — 85652 RBC SED RATE AUTOMATED: CPT

## 2023-10-31 PROCEDURE — 84443 ASSAY THYROID STIM HORMONE: CPT | Performed by: INTERNAL MEDICINE

## 2023-10-31 PROCEDURE — 83036 HEMOGLOBIN GLYCOSYLATED A1C: CPT | Performed by: INTERNAL MEDICINE

## 2023-10-31 PROCEDURE — 86200 CCP ANTIBODY: CPT

## 2023-10-31 PROCEDURE — 86038 ANTINUCLEAR ANTIBODIES: CPT

## 2023-10-31 PROCEDURE — 36415 COLL VENOUS BLD VENIPUNCTURE: CPT

## 2023-10-31 PROCEDURE — 84550 ASSAY OF BLOOD/URIC ACID: CPT

## 2023-10-31 PROCEDURE — 82607 VITAMIN B-12: CPT | Performed by: INTERNAL MEDICINE

## 2023-10-31 PROCEDURE — 80061 LIPID PANEL: CPT | Performed by: INTERNAL MEDICINE

## 2023-11-02 ENCOUNTER — EVALUATION (OUTPATIENT)
Dept: PHYSICAL THERAPY | Facility: CLINIC | Age: 73
End: 2023-11-02
Payer: MEDICARE

## 2023-11-02 DIAGNOSIS — E78.5 HYPERLIPIDEMIA, UNSPECIFIED HYPERLIPIDEMIA TYPE: Primary | ICD-10-CM

## 2023-11-02 DIAGNOSIS — M25.562 ACUTE PAIN OF LEFT KNEE: Primary | ICD-10-CM

## 2023-11-02 DIAGNOSIS — M62.81 MUSCLE WEAKNESS ON EXAMINATION: ICD-10-CM

## 2023-11-02 DIAGNOSIS — S86.112S GASTROCNEMIUS STRAIN, LEFT, SEQUELA: ICD-10-CM

## 2023-11-02 DIAGNOSIS — S86.112A GASTROCNEMIUS STRAIN, LEFT, INITIAL ENCOUNTER: ICD-10-CM

## 2023-11-02 LAB — CCP IGG SERPL-ACNC: <1 U/ML

## 2023-11-02 PROCEDURE — 97110 THERAPEUTIC EXERCISES: CPT | Mod: GP

## 2023-11-02 PROCEDURE — 97162 PT EVAL MOD COMPLEX 30 MIN: CPT | Mod: GP

## 2023-11-02 NOTE — LETTER
November 2, 2023     Patient: Blanquita Morales   YOB: 1950   Date of Visit: 11/2/2023       To Whom It May Concern:    It is my medical opinion that Blanquita Morales {Work release (duty restriction):[id number]}.    If you have any questions or concerns, please don't hesitate to call.         Sincerely,        Ifrah Valladares, PT    CC: No Recipients

## 2023-11-02 NOTE — LETTER
November 2, 2023    Ifrah Valladares, PT  [address] [facility] [address]  [facility] [address]    Patient: Blanquita Morales   YOB: 1950   Date of Visit: 11/2/2023       Dear Ulisses Cooper Md  [address] [facility] [address]  [place] [address]    The attached plan of care is being sent to you because your patient’s medical reimbursement requires that you certify the plan of care. Your signature is required to allow uninterrupted insurance coverage.      You may indicate your approval by signing below and faxing this form back to us at Dept Fax: [phone].    Please call Dept: [phone] with any questions or concerns.    Thank you for this referral,        Ifrah Valladares, PT  [facility] [address] [address]    Payer: Payor: [place] [employer] / Plan: [facility] MEDICARE ADVANTAGE / Product Type: *No Product type* /                                                                         Date:     Dear Ifrah Valladares, PT,     Re: Ms. Blanquita Morales, MRN:[id number]    I certify that I have reviewed the attached plan of care and it is medically necessary for Ms. Blanquita Morales (1950) who is under my care.          ______________________________________                    _________________  Provider name and credentials                                           Date and time                                                                                           Plan of Care 11/2/23   Effective from: 11/2/2023  Effective to: 1/1/2024    Plan ID: [id number]            Participants as of Finalize on 11/2/2023    Name Type Comments Contact Info    Ulisses Cooper MD Referring Provider  [phone]    Ifrah Valladares, PT Physical Therapist  [phone]       Last Plan Note     Author: Ifrah Valladares PT Status: Incomplete Last edited: 11/2/2023 12:45 PM         Physical Therapy  Physical Therapy Orthopedic Evaluation    Patient  Name: Blanquita Morales  MRN: [id number]  Today's Date: 11/2/2023  Time Calculation  Start Time: 1240  Stop Time: 1325  Time Calculation (min): 45 min    Insurance:  Visit [place]: 1 of [place]  Authorization info: [place]  Insurance Type: [employer]  Certification dates: 11-2-23 to 1-2-24    Current Problem  1. Acute pain of left knee  Follow Up In Physical Therapy      2. Gastrocnemius strain, left, initial encounter  PT eval and treat      3. Muscle weakness on examination  Follow Up In Physical Therapy      4. Gastrocnemius strain, left, sequela  Follow Up In Physical Therapy          Referred by: Dr. Cooper    General:     Left leg pain   Precautions:       Medical History Form: Reviewed (scanned into chart)    Subjective:   Patient  is a 72 yo female who presents with left knee pain/calf pain.  She is Jordanian speaking and is here with daughter to assist with translation. She reports pain in the posterior knee that will radiate down in into the calf for ~ 1 month.  She was seen by Dr. Cooper and given dx of gastroc strain and degenerative changes of the left knee. Patient  reports that over the last two weeks she has had increased soreness and difficulty with WB through the left LE. She has had to use a walker for the last two days.  She has no warmth in the calf, no redness, and no signs of color changes.  She reports when she is not moving the knee and not WB her pain is much better.      Chief Complaint: left posterior knee/calf pain.  Denies any low back pain or any other radicular symptoms.    Onset: ~ 1 month  OLIVER: none        Pain:   Reports 9/10 in pain in the left knee   Taking tylenol right now  Location: posterior calf/knee pain ( L), posterior lower leg pain with standing, anterior joint line pain with knee flexion  Description: sharp, throbbing pain in the left leg  Aggravating Factors:  Bending/Straightening Knee, Standing, Walking, Sitting, and Stairs,  all WB activities are limited  Relieving Factors:  Rest  and Ice    Relevant Information (PMH & Previous Tests/Imaging): Degenerative changes of left knee  Previous Interventions/Treatments: None    Prior Level of Function (PLOF)  Patient previously independent with all ADLs, no previous use of assistive prior to flare up  Work/School: Works at a [employer] [place] [employer]      Patients Living Environment: lives with daughter.  She is on one floor, basement has the laundry, stairs to the basement  Primary Language: [place], daughter her to aid in translation  Patient's Goal(s) for Therapy: decrease pain and return back to her previous level of functioning    Red Flags: Do you have any of the following? No  Fever/chills, unexplained weight changes, dizziness/fainting, unexplained change in bowel or bladder functions, unexplained malaise or muscle weakness, night pain/sweats, numbness or tingling    Objective:  Objective     Gait:  without any device, needs moderate assist under the arm pit by daughter.  Able to use wheeled walker, still has antalgic gait, decrease in active swing, decrease in stance time on the left LE    No warmth to the calf, no color changes, good capillary refill  Palpation:  has some tenderness spots in the proximal gastroc medial and lateral side, mid calf equal TTP on the right and left side  + pain to palpation over the medial and lateral joint line of the left knee    Has moderate swelling along the medial joint line of the left knee    No pain with passive stretch of the gastroc or soleus complex    Hip:  90 flexion bilateral, neutral in standing bilateral, ER WFL bilateral    Knee:  Right full AROM of the knee, no pain into any resistance, full knee extension  Left:  20:0 active knee extension in supine, sharp pain with attempts of passive knee extension into resistance in supine and in the seated position, best range 15:0 degrees of motion   Knee flexion moderate/high reactivity into resistance for knee flexion on the left  A/AROM 105 degrees of motion with  pain into resistance    Ankle:  full AROM of the left ankle, no pain into any resistance or with gastroc/soleus stretching       Right LE testin/5 in hip flexor, hip abd:  4-/5, knee flex:  5/5, knee ext 5/5  Left LE testing:  quad set:  poor increase in pain with active knee extension into large towel rolls, deferred hip screen secondary to high irritability today  Left gastroc:  5/5 no pain with testing of the left gastro  Soleus 4+/5:  no pain with testing         Posture: in standing position, will weight shift off of the left knee, stands with knee held in the semiflexed position     Lower Extremity Functional Movements  Transfers: positive use of the hands to decrease weight through the left LE  Unable to screen balance at this time secondary to irritability/reactivity in standing          Outcome Measures:  Other Measures  Lower Extremity Funtional Score (LEFS): 10     Treatments:  Review of use of walker to decrease stress/strain on the LE  Continue with ice  Follow up with MD to discuss changes in symptoms and to discuss need for additional medical management   Access Code: [email or web address]    Exercises  - Supine Heel Slide with Strap  - 2-3 x daily - 4-7 x weekly - 1-3 sets - 12-15 reps  - Supine Heel Slides  - 2-3 x daily - 4-7 x weekly - 1-3 sets - 12-15 reps  - Seated Knee Flexion Extension AROM   - 2-3 x daily - 4-7 x weekly - 1-3 sets - 12-15 reps  - Supine Quad Set  - 4 x weekly - 1-3 sets - 12-15 reps - 6 hold  - Long Sitting Calf Stretch with Strap  - 4 x weekly - 1-3 sets - 30-60  hold  - Standing Weight Shift Side to Side  - 4 x weekly - 1-3 sets - 12-15 reps  - Seated Long Arc Quad  - 4 x weekly - 1-3 sets - 12-15 reps  EDUCATION: Home exercise program, plan of care, activity modifications, pain management, and injury pathology  Education  Individual(s) Educated: Patient, Caregiver  Education Provided: Anatomy, Home Exercise Program, Other, POC  Plan of Care Discussed and Agreed Upon: yes  Patient  Response to Education: Patient/Caregiver Verbalized Understanding of Information    Assessment: Patient presents with signs and symptoms consistent with left knee pain associated with possible meniscal derangement, resulting in limited participation in pain-free ADLs and inability to perform at their prior level of function. Pt would benefit from physical therapy to address the impairments found & listed previously in the objective section in order to return to safe and pain-free ADLs and prior level of function.  PT Assessment Results: Decreased strength, Decreased range of motion, Impaired balance, Decreased mobility, Pain  Rehab Prognosis: Good  Evaluation/Treatment Tolerance: Patient limited by pain    Plan:  Treatment/Interventions: Cryotherapy, Education/ Instruction, Electrical stimulation, Gait training, Manual therapy, Neuromuscular re-education, Therapeutic exercises, Taping techniques  PT Plan: Skilled PT  Onset Date: 11/02/23  Certification Period Start Date: 11/02/23  Certification Period End Date: 01/01/24  Rehab Potential: Good  Plan of Care Agreement: Patient  Planned Interventions include: therapeutic exercise, self-care home management, manual therapy, therapeutic activities, gait training, neuromuscular coordination, vasopneumatic, dry needling, aquatic therapy  Frequency: 1-2 x Week  Duration: 8 Weeks  Goals: Set and discussed today  Active       PT Problem       PT Goal 1       Start:  11/02/23    Expected End:  01/01/24       1.  Patient will report < 2/10 in left knee pain with light physical activity.   2.  Patient will demonstrate  4- /5 in left LE musculature.   3.  Patient will be able to stand on LE  in SLS with minimal UE assistance for >20 secs to improve dynamic stability necessary for LE dressing.   4.   Patient will be able to successfully ascent/descend 1 flight of stairs reciprocally with the use of 1 hand rail.   5.   Patient will report > 5 on GROC scale.   6.  Patient will be  independent with HEP.   7.  Patient  will report an improvement in LEFS score to > 40/80.              Plan of care was developed with input and agreement by the patient and daughter      Ifrah Valladares PT         Current Participants as of 11/2/2023    Name Type Comments Contact Info    Ulisses Cooper MD Referring Provider  [phone]    Signature pending    Ifrah Valladares PT Physical Therapist  [phone]    Signature pending

## 2023-11-02 NOTE — PROGRESS NOTES
Physical Therapy  Physical Therapy Orthopedic Evaluation    Patient Name: Blanquita Morales  MRN: [id number]  Today's Date: 11/2/2023  Time Calculation  Start Time: 1240  Stop Time: 1325  Time Calculation (min): 45 min    Insurance:  Visit number: 1 of [place]  Authorization info: [place]  Insurance Type: [place] [employer]  Certification dates: 11-2-23 to 1-2-24    Current Problem  1. Acute pain of left knee  Follow Up In Physical Therapy      2. Gastrocnemius strain, left, initial encounter  PT eval and treat      3. Muscle weakness on examination  Follow Up In Physical Therapy      4. Gastrocnemius strain, left, sequela  Follow Up In Physical Therapy          Referred by: Dr. Cooper    General:     Left leg pain   Precautions:       Medical History Form: Reviewed (scanned into chart)    Subjective:   Patient  is a 74 yo female who presents with left knee pain/calf pain.  She is Estonian speaking and is here with daughter to assist with translation. She reports pain in the posterior knee that will radiate down in into the calf for ~ 1 month.  She was seen by Dr. Cooper and given dx of gastroc strain and degenerative changes of the left knee. Patient  reports that over the last two weeks she has had increased soreness and difficulty with WB through the left LE. She has had to use a walker for the last two days.  She has no warmth in the calf, no redness, and no signs of color changes.  She reports when she is not moving the knee and not WB her pain is much better.      Chief Complaint: left posterior knee/calf pain.  Denies any low back pain or any other radicular symptoms.    Onset: ~ 1 month  OLIVER: none        Pain:   Reports 9/10 in pain in the left knee   Taking tylenol right now  Location: posterior calf/knee pain ( L), posterior lower leg pain with standing, anterior joint line pain with knee flexion  Description: sharp, throbbing pain in the left leg  Aggravating Factors:  Bending/Straightening Knee, Standing, Walking, Sitting,  and Stairs,  all WB activities are limited  Relieving Factors:  Rest and Ice    Relevant Information (PMH & Previous Tests/Imaging): Degenerative changes of left knee  Previous Interventions/Treatments: None    Prior Level of Function (PLOF)  Patient previously independent with all ADLs, no previous use of assistive prior to flare up  Work/School: Works at a [place] [employer]      Patients Living Environment: lives with daughter.  She is on one floor, basement has the laundry, stairs to the basement  Primary Language: [place], daughter her to aid in translation  Patient's Goal(s) for Therapy: decrease pain and return back to her previous level of functioning    Red Flags: Do you have any of the following? No  Fever/chills, unexplained weight changes, dizziness/fainting, unexplained change in bowel or bladder functions, unexplained malaise or muscle weakness, night pain/sweats, numbness or tingling    Objective:  Objective     Gait:  without any device, needs moderate assist under the arm pit by daughter.  Able to use wheeled walker, still has antalgic gait, decrease in active swing, decrease in stance time on the left LE    No warmth to the calf, no color changes, good capillary refill  Palpation:  has some tenderness spots in the proximal gastroc medial and lateral side, mid calf equal TTP on the right and left side  + pain to palpation over the medial and lateral joint line of the left knee    Has moderate swelling along the medial joint line of the left knee    No pain with passive stretch of the gastroc or soleus complex    Hip:  90 flexion bilateral, neutral in standing bilateral, ER WFL bilateral    Knee:  Right full AROM of the knee, no pain into any resistance, full knee extension  Left:  20:0 active knee extension in supine, sharp pain with attempts of passive knee extension into resistance in supine and in the seated position, best range 15:0 degrees of motion   Knee flexion moderate/high reactivity into  resistance for knee flexion on the left  A/AROM 105 degrees of motion with pain into resistance    Ankle:  full AROM of the left ankle, no pain into any resistance or with gastroc/soleus stretching       Right LE testin/5 in hip flexor, hip abd:  4-/5, knee flex:  5/5, knee ext 5/5  Left LE testing:  quad set:  poor increase in pain with active knee extension into large towel rolls, deferred hip screen secondary to high irritability today  Left gastroc:  5/5 no pain with testing of the left gastro  Soleus 4+/5:  no pain with testing         Posture: in standing position, will weight shift off of the left knee, stands with knee held in the semiflexed position     Lower Extremity Functional Movements  Transfers: positive use of the hands to decrease weight through the left LE  Unable to screen balance at this time secondary to irritability/reactivity in standing          Outcome Measures:  Other Measures  Lower Extremity Funtional Score (LEFS): 10     Treatments:  Review of use of walker to decrease stress/strain on the LE  Continue with ice  Follow up with MD to discuss changes in symptoms and to discuss need for additional medical management   Access Code: [email or web address]    Exercises  - Supine Heel Slide with Strap  - 2-3 x daily - 4-7 x weekly - 1-3 sets - 12-15 reps  - Supine Heel Slides  - 2-3 x daily - 4-7 x weekly - 1-3 sets - 12-15 reps  - Seated Knee Flexion Extension AROM   - 2-3 x daily - 4-7 x weekly - 1-3 sets - 12-15 reps  - Supine Quad Set  - 4 x weekly - 1-3 sets - 12-15 reps - 6 hold  - Long Sitting Calf Stretch with Strap  - 4 x weekly - 1-3 sets - 30-60  hold  - Standing Weight Shift Side to Side  - 4 x weekly - 1-3 sets - 12-15 reps  - Seated Long Arc Quad  - 4 x weekly - 1-3 sets - 12-15 reps  EDUCATION: Home exercise program, plan of care, activity modifications, pain management, and injury pathology  Education  Individual(s) Educated: Patient, Caregiver  Education Provided: Anatomy, Home Exercise  Program, Other, POC  Plan of Care Discussed and Agreed Upon: yes  Patient Response to Education: Patient/Caregiver Verbalized Understanding of Information    Assessment: Patient presents with signs and symptoms consistent with left knee pain associated with possible meniscal derangement, resulting in limited participation in pain-free ADLs and inability to perform at their prior level of function. Pt would benefit from physical therapy to address the impairments found & listed previously in the objective section in order to return to safe and pain-free ADLs and prior level of function.  PT Assessment Results: Decreased strength, Decreased range of motion, Impaired balance, Decreased mobility, Pain  Rehab Prognosis: Good  Evaluation/Treatment Tolerance: Patient limited by pain    Plan:  Treatment/Interventions: Cryotherapy, Education/ Instruction, Electrical stimulation, Gait training, Manual therapy, Neuromuscular re-education, Therapeutic exercises, Taping techniques  PT Plan: Skilled PT  Onset Date: 11/02/23  Certification Period Start Date: 11/02/23  Certification Period End Date: 01/01/24  Rehab Potential: Good  Plan of Care Agreement: Patient  Planned Interventions include: therapeutic exercise, self-care home management, manual therapy, therapeutic activities, gait training, neuromuscular coordination, vasopneumatic, dry needling, aquatic therapy  Frequency: 1-2 x Week  Duration: 8 Weeks  Goals: Set and discussed today  Active       PT Problem       PT Goal 1       Start:  11/02/23    Expected End:  01/01/24       1.  Patient will report < 2/10 in left knee pain with light physical activity.   2.  Patient will demonstrate  4- /5 in left LE musculature.   3.  Patient will be able to stand on LE  in SLS with minimal UE assistance for >20 secs to improve dynamic stability necessary for LE dressing.   4.   Patient will be able to successfully ascent/descend 1 flight of stairs reciprocally with the use of 1 hand  krysten.   5.   Patient will report > 5 on GROC scale.   6.  Patient will be independent with HEP.   7.  Patient  will report an improvement in LEFS score to > 40/80.              Plan of care was developed with input and agreement by the patient and daughter      Ifrah Valladares, PT

## 2023-11-02 NOTE — LETTER
November 2, 2023     Patient: Blanquita Morales   YOB: 1950   Date of Visit: 11/2/2023       To Whom it May Concern:    Blanquita Morales was seen in my clinic on 11/2/2023. She {Return to school/sport:46290}.    If you have any questions or concerns, please don't hesitate to call.         Sincerely,          Ifrah Valladares, PT        CC: No Recipients

## 2023-11-03 ENCOUNTER — OFFICE VISIT (OUTPATIENT)
Dept: ORTHOPEDIC SURGERY | Facility: HOSPITAL | Age: 73
End: 2023-11-03
Payer: MEDICARE

## 2023-11-03 DIAGNOSIS — M17.12 PRIMARY OSTEOARTHRITIS OF LEFT KNEE: ICD-10-CM

## 2023-11-03 PROCEDURE — 1036F TOBACCO NON-USER: CPT | Performed by: FAMILY MEDICINE

## 2023-11-03 PROCEDURE — 2500000004 HC RX 250 GENERAL PHARMACY W/ HCPCS (ALT 636 FOR OP/ED): Performed by: FAMILY MEDICINE

## 2023-11-03 PROCEDURE — 20611 DRAIN/INJ JOINT/BURSA W/US: CPT | Performed by: FAMILY MEDICINE

## 2023-11-03 PROCEDURE — 1159F MED LIST DOCD IN RCRD: CPT | Performed by: FAMILY MEDICINE

## 2023-11-03 PROCEDURE — 2500000005 HC RX 250 GENERAL PHARMACY W/O HCPCS: Performed by: FAMILY MEDICINE

## 2023-11-03 PROCEDURE — 3078F DIAST BP <80 MM HG: CPT | Performed by: FAMILY MEDICINE

## 2023-11-03 PROCEDURE — 1125F AMNT PAIN NOTED PAIN PRSNT: CPT | Performed by: FAMILY MEDICINE

## 2023-11-03 PROCEDURE — 1160F RVW MEDS BY RX/DR IN RCRD: CPT | Performed by: FAMILY MEDICINE

## 2023-11-03 PROCEDURE — 3074F SYST BP LT 130 MM HG: CPT | Performed by: FAMILY MEDICINE

## 2023-11-03 PROCEDURE — 99214 OFFICE O/P EST MOD 30 MIN: CPT | Performed by: FAMILY MEDICINE

## 2023-11-03 RX ORDER — LIDOCAINE HYDROCHLORIDE 10 MG/ML
4 INJECTION, SOLUTION EPIDURAL; INFILTRATION; INTRACAUDAL; PERINEURAL
Status: COMPLETED | OUTPATIENT
Start: 2023-11-03 | End: 2023-11-03

## 2023-11-03 RX ORDER — METHYLPREDNISOLONE ACETATE 40 MG/ML
80 INJECTION, SUSPENSION INTRA-ARTICULAR; INTRALESIONAL; INTRAMUSCULAR; SOFT TISSUE
Status: COMPLETED | OUTPATIENT
Start: 2023-11-03 | End: 2023-11-03

## 2023-11-03 RX ADMIN — LIDOCAINE HYDROCHLORIDE 4 ML: 10 INJECTION, SOLUTION EPIDURAL; INFILTRATION; INTRACAUDAL; PERINEURAL at 10:38

## 2023-11-03 RX ADMIN — METHYLPREDNISOLONE ACETATE 80 MG: 40 INJECTION, SUSPENSION INTRA-ARTICULAR; INTRALESIONAL; INTRAMUSCULAR; INTRASYNOVIAL; SOFT TISSUE at 10:38

## 2023-11-03 NOTE — PROGRESS NOTES
Patient ID: Blanquita Morales is a 73 y.o. female.    L Inj/Asp: L knee on 11/3/2023 10:38 AM  Indications: pain  Details: 22 G needle, ultrasound-guided superolateral approach  Medications: 80 mg methylPREDNISolone acetate 40 mg/mL; 4 mL lidocaine PF 10 mg/mL (1 %)  Procedure, treatment alternatives, risks and benefits explained, specific risks discussed. Consent was given by the patient. Immediately prior to procedure a time out was called to verify the correct patient, procedure, equipment, support staff and site/side marked as required. Patient was prepped and draped in the usual sterile fashion.

## 2023-11-03 NOTE — PROGRESS NOTES
Left knee pain    History of Present Illness:  Encounter date: 11/03/2023  Blanquita Morales is a 73 y.o. [place] female working one day/week at local [employer] who presents today for follow-up of left knee pain.    On 10/20/2023, she was accompanied by her daughter who provided translation. She states she has had pain in posterior knee radiating down her left calf for roughly one month. States pain is 8/10-10/10. Denies any similar pain previously or any known injury/trauma. She is relatively active, but pain has limited her activity recently. She denies any significant swelling, bruising, numbness, tingling, or weakness. Also denies any low back pain, hip pain, or pain in foot. She does not take any medications for her pain as she did not respond well to Tylenol previously and cannot take NSAID's due to history of anemia. She wears compression stockings regularly without significant improvement. Denies any other complaints at this time.   We agreed to conservative management for suspected strain of medial left gastrocnemius. She does have some tenderness at left knee joint lines with DJD of left knee, but tenderness most severe over medial head of gastrocnemius. She may try applying Voltaren gel three times daily as needed over area of pain in addition to ice/heat as needed for relief. Discussed she may also try Tylenol, but she prefers not to take oral medications for her pain. ACE wrap applied to allow compression of gastrocnemius. Physical therapy referral provided today as well. She should follow-up in 4-5 weeks or sooner as needed.      Today, 11/3/2023, she is again accompanied by her daughter who provided translation. She has been to one PT session yesterday and tolerated well with strengthening, but had pain with full ROM exercises. She has not had any improvement of her pain since last evaluation, but denies any new injury/trauma, radiation of her pain, numbness, tingling, weakness, redness, or warmth.  She does feel her left knee is more swollen than before. She has taken one dose of Tylenol 500 mg since last evaluation for headache which did provide some temporary relief of her knee pain, but does not want to continue taking oral medications for pain. She has worn compression intermittently, but states it can be uncomfortable at times. She is using a walker due to pain with ambulation. She is interested in trying cortisone injection of her left knee if appropriate. Denies other complaints at this time.    Physical Exam:  The LEFT knee is without obvious signs of acute bony deformity, erythema, ecchymosis. There is mild anterior swelling with mild joint effusion. The patella is without tenderness. Apprehension is negative with medial and lateral glide. Patella crepitus is positive. Patella grind is negative. The medial joint line is tender and without bony crepitus or step-off. The lateral joint line is slightly tender and without bony crepitus or step-off. Flexion & extension slightly limited compared to opposite knee due to pain. Varus & valgus stress test at 0° and 30° of flexion, Lachman's, Oksana's, and posterior drawer are all negative. There is tenderness to palpation throughout medial head of gastrocnemius over right and left lower extremities. No significant lower extremity edema. The opposite knee is slightly tender over medial joint line and stable. Gait is antalgic, walker assisted, and tandem.     Procedure:  After consent was obtained, the left knee was prepped in a sterile fashion. Ultrasound guidance was used to help insure proper needle placement into the  joint , decrease patient discomfort, and decrease collateral damage. The joint was visualized and Depo-Medrol 80 mg with lidocaine 4 mL & ropivacaine 4 mL were injected without any complications. Ultrasound images were saved on an internal file for later reference. The patient tolerated the procedure well and the area was cleaned and  bandaged.    Procedure Note Attestation   Procedure performed by my sports medicine fellow.    I, Dr. Ulisses Cooper MD, supervised the entire procedure .    Problem List Items Addressed This Visit             ICD-10-CM    Osteoarthritis of left knee M17.12    Relevant Orders    Point of Care Ultrasound (Completed)     Patient Discussion/Summary  We reviewed the exam and x-ray findings and discussed the conservative and surgical treatment options. We agreed to a diagnostic and hopefully therapeutic cortisone injection into the left knee.  She tolerated this well. Activity modifications were reviewed. She may continue to try prescription dose of Tylenol as needed for knee pain in addition to compression, ice after activity as needed. She should continue with PT for her knee pain as well as gastrocnemius strain. I will see  her back in 4 weeks or sooner as needed.     **This note was dictated using Dragon speech recognition software and was not corrected for spelling or grammatical errors**.

## 2023-11-06 LAB — ANA SER QL HEP2 SUBST: NEGATIVE

## 2023-11-07 ENCOUNTER — TREATMENT (OUTPATIENT)
Dept: PHYSICAL THERAPY | Facility: CLINIC | Age: 73
End: 2023-11-07
Payer: MEDICARE

## 2023-11-07 DIAGNOSIS — M25.562 ACUTE PAIN OF LEFT KNEE: ICD-10-CM

## 2023-11-07 DIAGNOSIS — S86.112S GASTROCNEMIUS STRAIN, LEFT, SEQUELA: Primary | ICD-10-CM

## 2023-11-07 DIAGNOSIS — M62.81 MUSCLE WEAKNESS ON EXAMINATION: ICD-10-CM

## 2023-11-07 PROCEDURE — 97110 THERAPEUTIC EXERCISES: CPT | Mod: GP

## 2023-11-07 PROCEDURE — 97140 MANUAL THERAPY 1/> REGIONS: CPT | Mod: GP

## 2023-11-07 NOTE — PROGRESS NOTES
Physical Therapy  Physical Therapy Treatment Note    Patient Name: Blanquita Morales  MRN: [id number]  Today's Date: 11/7/2023  Time Calculation  Start Time: 0900  Stop Time: 0945  Time Calculation (min): 45 min    Insurance:  Visit number: 2 of [place]  Authorization info: [place]  Certification dates:   11-2-23 to 1-2-24    Current Problem  1. Gastrocnemius strain, left, sequela  Follow Up In Physical Therapy      2. Muscle weakness on examination  Follow Up In Physical Therapy      3. Acute pain of left knee  Follow Up In Physical Therapy            Precautions       Subjective:     Patient reports that she had a follow up with Dr. Cooper on 11-3.  She ended up getting a cortisone injection in the left knee.  Since the injection she reports an overall slight decrease in symptoms.  Daughter reports that the found her to be bone on bone in the lateral compartment of the left knee.  She has the most difficulty still with ambulation and any rotational movement through the left knee    Walks into the clinic with her wheeled walker  Pain     Overall a little better than last week  Objective:       Passive knee extension:  15: 0  Able to sit comfortably on the mat and have A/AROM of knee flexion of 100 degrees comfortably in the upright position  Treatments:   - review of gait mechanics with the wheeled walker  - supine STM over the knee ( with two large towel rolls under the left knee and head elevated on bolster and rolled pillow)  Distal to proximal for soft tissue edema, quadricep mobilization, patellar mobility, proximal gastroc, lateral IT band region  - seated over the edge manual distraction, grade 2 with towel roll, mobilization with movement for flexion/extension  - K tape for patellar stabilization, general knee joint stability left knee  ( Total hands on manual therapy 30 min)  - passive knee extension/flexion in the seated position  - gastroc stretch manual  - quad set into towel rolls x 6 secs x 12 reps  - supine heel  prop with towel very proximal near joint line  - continue with same HEP at this time       Assessment:       Will evaluated how patient tolerates the soft tissue massage and the gentle strengthening exercises.  Depending on patient's irritability and reactivity will attempt to perform a little more active program in the supine/unloaded positions.     Plan: continue to focus on knee mobility, functional LE strengthening, gait training, and balance        Goals:  Active       PT Problem       PT Goal 1       Start:  11/02/23    Expected End:  01/01/24       1.  Patient will report < 2/10 in left knee pain with light physical activity.   2.  Patient will demonstrate  4- /5 in left LE musculature.   3.  Patient will be able to stand on LE  in SLS with minimal UE assistance for >20 secs to improve dynamic stability necessary for LE dressing.   4.   Patient will be able to successfully ascent/descend 1 flight of stairs reciprocally with the use of 1 hand rail.   5.   Patient will report > 5 on GROC scale.   6.  Patient will be independent with HEP.   7.  Patient  will report an improvement in LEFS score to > 40/80.               Ifrah Valladares, PT

## 2023-11-15 ENCOUNTER — APPOINTMENT (OUTPATIENT)
Dept: RADIOLOGY | Facility: HOSPITAL | Age: 73
End: 2023-11-15
Payer: MEDICARE

## 2023-11-15 ENCOUNTER — HOSPITAL ENCOUNTER (EMERGENCY)
Facility: HOSPITAL | Age: 73
Discharge: OTHER INSTITUTION | End: 2023-11-15
Attending: GENERAL PRACTICE
Payer: MEDICARE

## 2023-11-15 ENCOUNTER — HOSPITAL ENCOUNTER (INPATIENT)
Facility: HOSPITAL | Age: 73
LOS: 2 days | Discharge: HOME | DRG: 479 | End: 2023-11-18
Attending: EMERGENCY MEDICINE | Admitting: STUDENT IN AN ORGANIZED HEALTH CARE EDUCATION/TRAINING PROGRAM
Payer: MEDICARE

## 2023-11-15 VITALS
HEART RATE: 99 BPM | OXYGEN SATURATION: 96 % | WEIGHT: 188 LBS | TEMPERATURE: 97 F | BODY MASS INDEX: 30.22 KG/M2 | HEIGHT: 66 IN | SYSTOLIC BLOOD PRESSURE: 131 MMHG | DIASTOLIC BLOOD PRESSURE: 59 MMHG | RESPIRATION RATE: 16 BRPM

## 2023-11-15 DIAGNOSIS — S72.92XA CLOSED FRACTURE OF LEFT FEMUR, UNSPECIFIED FRACTURE MORPHOLOGY, UNSPECIFIED PORTION OF FEMUR, INITIAL ENCOUNTER (MULTI): Primary | ICD-10-CM

## 2023-11-15 DIAGNOSIS — S72.90XA CLOSED FRACTURE OF FEMUR, UNSPECIFIED FRACTURE MORPHOLOGY, UNSPECIFIED LATERALITY, UNSPECIFIED PORTION OF FEMUR, INITIAL ENCOUNTER (MULTI): Primary | ICD-10-CM

## 2023-11-15 LAB
ABO GROUP (TYPE) IN BLOOD: NORMAL
ALBUMIN SERPL BCP-MCNC: 3.9 G/DL (ref 3.4–5)
ALP SERPL-CCNC: 155 U/L (ref 33–136)
ALT SERPL W P-5'-P-CCNC: 41 U/L (ref 7–45)
ANION GAP SERPL CALC-SCNC: 16 MMOL/L (ref 10–20)
ANION GAP SERPL CALC-SCNC: 20 MMOL/L (ref 10–20)
ANTIBODY SCREEN: NORMAL
APPEARANCE UR: CLEAR
AST SERPL W P-5'-P-CCNC: 56 U/L (ref 9–39)
BACTERIA #/AREA URNS AUTO: ABNORMAL /HPF
BASOPHILS # BLD AUTO: 0.06 X10*3/UL (ref 0–0.1)
BASOPHILS NFR BLD AUTO: 0.5 %
BILIRUB SERPL-MCNC: 0.5 MG/DL (ref 0–1.2)
BILIRUB UR STRIP.AUTO-MCNC: NEGATIVE MG/DL
BUN SERPL-MCNC: 11 MG/DL (ref 6–23)
BUN SERPL-MCNC: 11 MG/DL (ref 6–23)
CALCIUM SERPL-MCNC: 9.1 MG/DL (ref 8.6–10.3)
CALCIUM SERPL-MCNC: 9.3 MG/DL (ref 8.6–10.3)
CHLORIDE SERPL-SCNC: 102 MMOL/L (ref 98–107)
CHLORIDE SERPL-SCNC: 103 MMOL/L (ref 98–107)
CO2 SERPL-SCNC: 18 MMOL/L (ref 21–32)
CO2 SERPL-SCNC: 21 MMOL/L (ref 21–32)
COLOR UR: YELLOW
CREAT SERPL-MCNC: 0.72 MG/DL (ref 0.5–1.05)
CREAT SERPL-MCNC: 0.88 MG/DL (ref 0.5–1.05)
CRP SERPL-MCNC: 5.15 MG/DL
EOSINOPHIL # BLD AUTO: 0.01 X10*3/UL (ref 0–0.4)
EOSINOPHIL NFR BLD AUTO: 0.1 %
ERYTHROCYTE [DISTWIDTH] IN BLOOD BY AUTOMATED COUNT: 15.1 % (ref 11.5–14.5)
ERYTHROCYTE [DISTWIDTH] IN BLOOD BY AUTOMATED COUNT: 15.1 % (ref 11.5–14.5)
ERYTHROCYTE [SEDIMENTATION RATE] IN BLOOD BY WESTERGREN METHOD: 61 MM/H (ref 0–30)
GFR SERPL CREATININE-BSD FRML MDRD: 69 ML/MIN/1.73M*2
GFR SERPL CREATININE-BSD FRML MDRD: 88 ML/MIN/1.73M*2
GLUCOSE SERPL-MCNC: 113 MG/DL (ref 74–99)
GLUCOSE SERPL-MCNC: 119 MG/DL (ref 74–99)
GLUCOSE UR STRIP.AUTO-MCNC: NEGATIVE MG/DL
HCT VFR BLD AUTO: 35.9 % (ref 36–46)
HCT VFR BLD AUTO: 35.9 % (ref 36–46)
HGB BLD-MCNC: 11.2 G/DL (ref 12–16)
HGB BLD-MCNC: 11.2 G/DL (ref 12–16)
IMM GRANULOCYTES # BLD AUTO: 0.04 X10*3/UL (ref 0–0.5)
IMM GRANULOCYTES NFR BLD AUTO: 0.3 % (ref 0–0.9)
INR PPP: 1.2 (ref 0.9–1.1)
KETONES UR STRIP.AUTO-MCNC: ABNORMAL MG/DL
LDH SERPL L TO P-CCNC: 548 U/L (ref 84–246)
LEUKOCYTE ESTERASE UR QL STRIP.AUTO: ABNORMAL
LYMPHOCYTES # BLD AUTO: 1.12 X10*3/UL (ref 0.8–3)
LYMPHOCYTES NFR BLD AUTO: 9.4 %
MCH RBC QN AUTO: 26.6 PG (ref 26–34)
MCH RBC QN AUTO: 26.6 PG (ref 26–34)
MCHC RBC AUTO-ENTMCNC: 31.2 G/DL (ref 32–36)
MCHC RBC AUTO-ENTMCNC: 31.2 G/DL (ref 32–36)
MCV RBC AUTO: 85 FL (ref 80–100)
MCV RBC AUTO: 85 FL (ref 80–100)
MONOCYTES # BLD AUTO: 0.66 X10*3/UL (ref 0.05–0.8)
MONOCYTES NFR BLD AUTO: 5.5 %
MUCOUS THREADS #/AREA URNS AUTO: ABNORMAL /LPF
NEUTROPHILS # BLD AUTO: 10.08 X10*3/UL (ref 1.6–5.5)
NEUTROPHILS NFR BLD AUTO: 84.2 %
NITRITE UR QL STRIP.AUTO: NEGATIVE
NRBC BLD-RTO: 0 /100 WBCS (ref 0–0)
NRBC BLD-RTO: 0 /100 WBCS (ref 0–0)
PH UR STRIP.AUTO: 5 [PH]
PLATELET # BLD AUTO: 216 X10*3/UL (ref 150–450)
PLATELET # BLD AUTO: 216 X10*3/UL (ref 150–450)
POTASSIUM SERPL-SCNC: 5 MMOL/L (ref 3.5–5.3)
POTASSIUM SERPL-SCNC: 5.1 MMOL/L (ref 3.5–5.3)
PROT SERPL-MCNC: 7.6 G/DL (ref 6.4–8.2)
PROT UR STRIP.AUTO-MCNC: NEGATIVE MG/DL
PROTHROMBIN TIME: 13.5 SECONDS (ref 9.8–12.8)
RBC # BLD AUTO: 4.21 X10*6/UL (ref 4–5.2)
RBC # BLD AUTO: 4.21 X10*6/UL (ref 4–5.2)
RBC # UR STRIP.AUTO: ABNORMAL /UL
RBC #/AREA URNS AUTO: ABNORMAL /HPF
RH FACTOR (ANTIGEN D): NORMAL
SODIUM SERPL-SCNC: 135 MMOL/L (ref 136–145)
SODIUM SERPL-SCNC: 135 MMOL/L (ref 136–145)
SP GR UR STRIP.AUTO: 1.02
SQUAMOUS #/AREA URNS AUTO: ABNORMAL /HPF
UROBILINOGEN UR STRIP.AUTO-MCNC: <2 MG/DL
WBC # BLD AUTO: 12 X10*3/UL (ref 4.4–11.3)
WBC # BLD AUTO: 12 X10*3/UL (ref 4.4–11.3)
WBC #/AREA URNS AUTO: ABNORMAL /HPF

## 2023-11-15 PROCEDURE — 86900 BLOOD TYPING SEROLOGIC ABO: CPT | Mod: 91 | Performed by: GENERAL PRACTICE

## 2023-11-15 PROCEDURE — 99222 1ST HOSP IP/OBS MODERATE 55: CPT | Performed by: STUDENT IN AN ORGANIZED HEALTH CARE EDUCATION/TRAINING PROGRAM

## 2023-11-15 PROCEDURE — 73552 X-RAY EXAM OF FEMUR 2/>: CPT | Mod: LT

## 2023-11-15 PROCEDURE — 99285 EMERGENCY DEPT VISIT HI MDM: CPT | Mod: 25 | Performed by: GENERAL PRACTICE

## 2023-11-15 PROCEDURE — 84166 PROTEIN E-PHORESIS/URINE/CSF: CPT | Mod: AHULAB

## 2023-11-15 PROCEDURE — 73720 MRI LWR EXTREMITY W/O&W/DYE: CPT | Mod: LEFT SIDE | Performed by: RADIOLOGY

## 2023-11-15 PROCEDURE — 83615 LACTATE (LD) (LDH) ENZYME: CPT

## 2023-11-15 PROCEDURE — 84165 PROTEIN E-PHORESIS SERUM: CPT | Mod: AHULAB

## 2023-11-15 PROCEDURE — 85610 PROTHROMBIN TIME: CPT | Performed by: GENERAL PRACTICE

## 2023-11-15 PROCEDURE — 85652 RBC SED RATE AUTOMATED: CPT

## 2023-11-15 PROCEDURE — 74177 CT ABD & PELVIS W/CONTRAST: CPT

## 2023-11-15 PROCEDURE — 84166 PROTEIN E-PHORESIS/URINE/CSF: CPT

## 2023-11-15 PROCEDURE — 73720 MRI LWR EXTREMITY W/O&W/DYE: CPT | Mod: LT

## 2023-11-15 PROCEDURE — 73502 X-RAY EXAM HIP UNI 2-3 VIEWS: CPT | Mod: LEFT SIDE | Performed by: RADIOLOGY

## 2023-11-15 PROCEDURE — 73700 CT LOWER EXTREMITY W/O DYE: CPT | Mod: LT

## 2023-11-15 PROCEDURE — 99285 EMERGENCY DEPT VISIT HI MDM: CPT | Mod: 27 | Performed by: EMERGENCY MEDICINE

## 2023-11-15 PROCEDURE — A9575 INJ GADOTERATE MEGLUMI 0.1ML: HCPCS | Performed by: GENERAL PRACTICE

## 2023-11-15 PROCEDURE — 84156 ASSAY OF PROTEIN URINE: CPT | Mod: AHULAB

## 2023-11-15 PROCEDURE — 84165 PROTEIN E-PHORESIS SERUM: CPT

## 2023-11-15 PROCEDURE — 84155 ASSAY OF PROTEIN SERUM: CPT | Mod: 59,AHULAB

## 2023-11-15 PROCEDURE — 86320 SERUM IMMUNOELECTROPHORESIS: CPT

## 2023-11-15 PROCEDURE — 73560 X-RAY EXAM OF KNEE 1 OR 2: CPT | Mod: LEFT SIDE | Performed by: RADIOLOGY

## 2023-11-15 PROCEDURE — 73552 X-RAY EXAM OF FEMUR 2/>: CPT | Mod: LEFT SIDE | Performed by: RADIOLOGY

## 2023-11-15 PROCEDURE — 73502 X-RAY EXAM HIP UNI 2-3 VIEWS: CPT | Mod: LT

## 2023-11-15 PROCEDURE — 36415 COLL VENOUS BLD VENIPUNCTURE: CPT | Performed by: GENERAL PRACTICE

## 2023-11-15 PROCEDURE — 84155 ASSAY OF PROTEIN SERUM: CPT | Mod: AHULAB

## 2023-11-15 PROCEDURE — 96374 THER/PROPH/DIAG INJ IV PUSH: CPT | Mod: 59

## 2023-11-15 PROCEDURE — 99285 EMERGENCY DEPT VISIT HI MDM: CPT | Performed by: EMERGENCY MEDICINE

## 2023-11-15 PROCEDURE — 86140 C-REACTIVE PROTEIN: CPT

## 2023-11-15 PROCEDURE — 96372 THER/PROPH/DIAG INJ SC/IM: CPT

## 2023-11-15 PROCEDURE — 85025 COMPLETE CBC W/AUTO DIFF WBC: CPT | Performed by: GENERAL PRACTICE

## 2023-11-15 PROCEDURE — 2500000004 HC RX 250 GENERAL PHARMACY W/ HCPCS (ALT 636 FOR OP/ED): Performed by: STUDENT IN AN ORGANIZED HEALTH CARE EDUCATION/TRAINING PROGRAM

## 2023-11-15 PROCEDURE — 74177 CT ABD & PELVIS W/CONTRAST: CPT | Performed by: RADIOLOGY

## 2023-11-15 PROCEDURE — 81001 URINALYSIS AUTO W/SCOPE: CPT

## 2023-11-15 PROCEDURE — 84075 ASSAY ALKALINE PHOSPHATASE: CPT

## 2023-11-15 PROCEDURE — 71260 CT THORAX DX C+: CPT | Performed by: RADIOLOGY

## 2023-11-15 PROCEDURE — 85025 COMPLETE CBC W/AUTO DIFF WBC: CPT

## 2023-11-15 PROCEDURE — 96376 TX/PRO/DX INJ SAME DRUG ADON: CPT | Mod: 59

## 2023-11-15 PROCEDURE — 2500000004 HC RX 250 GENERAL PHARMACY W/ HCPCS (ALT 636 FOR OP/ED)

## 2023-11-15 PROCEDURE — 84165 PROTEIN E-PHORESIS SERUM: CPT | Mod: AHULAB,91

## 2023-11-15 PROCEDURE — 73560 X-RAY EXAM OF KNEE 1 OR 2: CPT | Mod: LT

## 2023-11-15 PROCEDURE — 80048 BASIC METABOLIC PNL TOTAL CA: CPT | Performed by: GENERAL PRACTICE

## 2023-11-15 PROCEDURE — 73700 CT LOWER EXTREMITY W/O DYE: CPT | Mod: LEFT SIDE | Performed by: STUDENT IN AN ORGANIZED HEALTH CARE EDUCATION/TRAINING PROGRAM

## 2023-11-15 PROCEDURE — 2500000004 HC RX 250 GENERAL PHARMACY W/ HCPCS (ALT 636 FOR OP/ED): Performed by: GENERAL PRACTICE

## 2023-11-15 PROCEDURE — 2550000001 HC RX 255 CONTRASTS: Performed by: GENERAL PRACTICE

## 2023-11-15 RX ORDER — MORPHINE SULFATE 2 MG/ML
2 INJECTION, SOLUTION INTRAMUSCULAR; INTRAVENOUS EVERY 4 HOURS PRN
Status: DISCONTINUED | OUTPATIENT
Start: 2023-11-15 | End: 2023-11-15 | Stop reason: HOSPADM

## 2023-11-15 RX ORDER — MORPHINE SULFATE 2 MG/ML
INJECTION, SOLUTION INTRAMUSCULAR; INTRAVENOUS
Status: COMPLETED
Start: 2023-11-15 | End: 2023-11-15

## 2023-11-15 RX ORDER — GADOTERATE MEGLUMINE 376.9 MG/ML
17 INJECTION INTRAVENOUS
Status: COMPLETED | OUTPATIENT
Start: 2023-11-15 | End: 2023-11-15

## 2023-11-15 RX ORDER — MORPHINE SULFATE 4 MG/ML
4 INJECTION, SOLUTION INTRAMUSCULAR; INTRAVENOUS ONCE
Status: DISCONTINUED | OUTPATIENT
Start: 2023-11-15 | End: 2023-11-15

## 2023-11-15 RX ORDER — MORPHINE SULFATE 4 MG/ML
4 INJECTION, SOLUTION INTRAMUSCULAR; INTRAVENOUS ONCE
Status: COMPLETED | OUTPATIENT
Start: 2023-11-15 | End: 2023-11-15

## 2023-11-15 RX ADMIN — MORPHINE SULFATE 4 MG: 4 INJECTION, SOLUTION INTRAMUSCULAR; INTRAVENOUS at 12:08

## 2023-11-15 RX ADMIN — MORPHINE SULFATE 2 MG: 2 INJECTION, SOLUTION INTRAMUSCULAR; INTRAVENOUS at 21:21

## 2023-11-15 RX ADMIN — MORPHINE SULFATE 2 MG: 2 INJECTION, SOLUTION INTRAMUSCULAR; INTRAVENOUS at 16:25

## 2023-11-15 RX ADMIN — GADOTERATE MEGLUMINE 17 ML: 376.9 INJECTION INTRAVENOUS at 23:02

## 2023-11-15 RX ADMIN — IOHEXOL 75 ML: 350 INJECTION, SOLUTION INTRAVENOUS at 19:48

## 2023-11-15 ASSESSMENT — PAIN - FUNCTIONAL ASSESSMENT
PAIN_FUNCTIONAL_ASSESSMENT: 0-10
PAIN_FUNCTIONAL_ASSESSMENT: 0-10

## 2023-11-15 ASSESSMENT — PAIN DESCRIPTION - ORIENTATION: ORIENTATION: LEFT

## 2023-11-15 ASSESSMENT — PAIN SCALES - GENERAL
PAINLEVEL_OUTOF10: 6
PAINLEVEL_OUTOF10: 10 - WORST POSSIBLE PAIN

## 2023-11-15 ASSESSMENT — PAIN DESCRIPTION - LOCATION: LOCATION: KNEE

## 2023-11-15 NOTE — CONSULTS
Reason For Consult  Distal left femur fracture    History Of Present Illness  Blanquita Morales is a 73 y.o. female presenting with distal left femur fracture S/P fall. Patient is [place]ian speaking and history was obtained from daughter over the phone. Patient has a history of left knee OA and instability since September, has been following up with Dr. Cooper and trying conservative measures such as physical therapy and as of last week corticosteroid injections. Per daugther patient was using walker and attempting to get dressed this morning when she twisted and felt her left knee give out, prompting her to fall on her left side. Denies landing on arm, denies upper extremity pain, denies head strike. Patient was unable to move her left leg after encounter so she manually crossed it over her right leg and then body crawled to the other room. Per daughter she was unable to uncross her legs until she was in the ED and heavily medicated. Pain is described as a sharp twisting pain that is worsened with internal rotation/external rotation at level of hip and knee.      Past Medical History  PMH: HLD, GERD, psoriasis, left gastrocnemius strain, latent TB S/P INH, vit D deficiency     Surgical History  She has no past surgical history on file.     Social History  She reports that she has never smoked. She has never been exposed to tobacco smoke. She has never used smokeless tobacco. She reports that she does not drink alcohol and does not use drugs.    Family History  Family History   Problem Relation Name Age of Onset    Diabetes Mother      Stroke Mother      Heart attack Father          Allergies  Amoxicillin and Penicillins    Review of Systems    Unable to obtain full ROS due to language barrier      Physical Exam  PE:  Constitutional: A&Ox3, calm and cooperative, NAD  Eyes: EOMI, clear sclera   Cardiovascular: Normal rate and regular rhythm. No murmurs  Respiratory/Thorax: CTAB, on RA  Genitourinary: Voiding independently    Musculoskeletal: left knee with mild edema, no noticeable erythema or hematoma formation. Fires DF/PF, wiggles toes, brisk cap refill, 2+ DP/PT pulses. NVI  Extremities: No peripheral edema, contusions, or wounds  Neurological: A&Ox3, No focal deficits   Psychological: Appropriate mood and behavior       Last Recorded Vitals  Blood pressure 125/61, pulse 99, temperature 36.1 °C (97 °F), temperature source Temporal, resp. rate 16, SpO2 96 %.    Relevant Results  XR femur left 2+ views    Result Date: 11/15/2023   1. The distal femoral metaphysis has comminuted fracture extending from the medial metaphysis inferiorly to the intracondylar notch with an additional fracture line extending laterally superior to the lateral femoral condyle. The condylar fragment is minimally displaced laterally.   2. An ill-defined calcifications/ossification inferior to the left hip joint is of uncertain etiology; no donor site is noted. While this may represents prior injury with soft tissue calcification, acute fracture cannot be excluded.     MACRO: None   Signed by: Dl Gordillo 11/15/2023 1:18 PM Dictation workstation:   [id number]    XR hip left 2 or 3 views    Result Date: 11/15/2023  1. The distal femoral metaphysis has comminuted fracture extending from the medial metaphysis inferiorly to the intracondylar notch with an additional fracture line extending laterally superior to the lateral femoral condyle. The condylar fragment is minimally displaced laterally.   2. An ill-defined calcifications/ossification inferior to the left hip joint is of uncertain etiology; no donor site is noted. While this may represents prior injury with soft tissue calcification, acute fracture cannot be excluded.     MACRO: None   Signed by: Dl Gordillo 11/15/2023 1:18 PM Dictation workstation:   [id number]    XR knee left 1-2 views    Result Date: 11/15/2023  1. The distal femoral metaphysis has comminuted fracture extending from the medial  metaphysis inferiorly to the intracondylar notch with an additional fracture line extending laterally superior to the lateral femoral condyle. The condylar fragment is minimally displaced laterally.   2. An ill-defined calcifications/ossification inferior to the left hip joint is of uncertain etiology; no donor site is noted. While this may represents prior injury with soft tissue calcification, acute fracture cannot be excluded.     MACRO: None   Signed by: Dl Gordillo 11/15/2023 1:18 PM Dictation workstation:   [id number]      Scheduled medications     Continuous medications     PRN medications  PRN medications: morphine    Results for orders placed or performed during the hospital encounter of 11/15/23 (from the past 24 hour(s))   Basic metabolic panel   Result Value Ref Range    Glucose 119 (H) 74 - 99 mg/dL    Sodium 135 (L) 136 - 145 mmol/L    Potassium 5.0 3.5 - 5.3 mmol/L    Chloride 103 98 - 107 mmol/L    Bicarbonate 21 21 - 32 mmol/L    Anion Gap 16 10 - 20 mmol/L    Urea Nitrogen 11 6 - 23 mg/dL    Creatinine 0.88 0.50 - 1.05 mg/dL    eGFR 69 >60 mL/min/1.73m*2    Calcium 9.1 8.6 - 10.3 mg/dL   CBC   Result Value Ref Range    WBC 12.0 (H) 4.4 - 11.3 x10*3/uL    nRBC 0.0 0.0 - 0.0 /100 WBCs    RBC 4.21 4.00 - 5.20 x10*6/uL    Hemoglobin 11.2 (L) 12.0 - 16.0 g/dL    Hematocrit 35.9 (L) 36.0 - 46.0 %    MCV 85 80 - 100 fL    MCH 26.6 26.0 - 34.0 pg    MCHC 31.2 (L) 32.0 - 36.0 g/dL    RDW 15.1 (H) 11.5 - 14.5 %    Platelets 216 150 - 450 x10*3/uL        Assessment/Plan     Comminuted distal femur metaphysis fracture extending from medial metaphysis inferiorly to the intracondylar notch with additional fracture line extending to lateral femoral condyle.     Recommendations:  - CT femur pending  - NWB LLE  - Rec DVT chemoppx with SCDs. DVT ppx per trauma protocol.  - pain control per ED  - Dispo per ED    Discussed with Dr. Moore and Dr. Rodriguez, need for orthopedic intervention to be determined after CT  scan.   Dr. Cooper was also informed       I spent 60 minutes in the professional and overall care of this patient.       MAXINE GarciaC

## 2023-11-16 ENCOUNTER — APPOINTMENT (OUTPATIENT)
Dept: CARDIOLOGY | Facility: HOSPITAL | Age: 73
DRG: 479 | End: 2023-11-16
Payer: MEDICARE

## 2023-11-16 ENCOUNTER — APPOINTMENT (OUTPATIENT)
Dept: RADIOLOGY | Facility: HOSPITAL | Age: 73
DRG: 479 | End: 2023-11-16
Payer: MEDICARE

## 2023-11-16 ENCOUNTER — ANESTHESIA (OUTPATIENT)
Dept: OPERATING ROOM | Facility: HOSPITAL | Age: 73
DRG: 479 | End: 2023-11-16
Payer: MEDICARE

## 2023-11-16 ENCOUNTER — APPOINTMENT (OUTPATIENT)
Dept: PHYSICAL THERAPY | Facility: CLINIC | Age: 73
End: 2023-11-16
Payer: MEDICARE

## 2023-11-16 ENCOUNTER — ANESTHESIA EVENT (OUTPATIENT)
Dept: OPERATING ROOM | Facility: HOSPITAL | Age: 73
DRG: 479 | End: 2023-11-16
Payer: MEDICARE

## 2023-11-16 PROBLEM — S72.90XA: Status: ACTIVE | Noted: 2023-11-16

## 2023-11-16 LAB
ABO GROUP (TYPE) IN BLOOD: NORMAL
ALBUMIN SERPL BCP-MCNC: 3.8 G/DL (ref 3.4–5)
ALP SERPL-CCNC: 132 U/L (ref 33–136)
ALT SERPL W P-5'-P-CCNC: 26 U/L (ref 7–45)
ANION GAP SERPL CALC-SCNC: 16 MMOL/L (ref 10–20)
ANTIBODY SCREEN: NORMAL
AST SERPL W P-5'-P-CCNC: 23 U/L (ref 9–39)
ATRIAL RATE: 106 BPM
BASOPHILS # BLD AUTO: 0.04 X10*3/UL (ref 0–0.1)
BASOPHILS NFR BLD AUTO: 0.4 %
BILIRUB SERPL-MCNC: 0.5 MG/DL (ref 0–1.2)
BUN SERPL-MCNC: 8 MG/DL (ref 6–23)
CALCIUM SERPL-MCNC: 9.2 MG/DL (ref 8.6–10.6)
CHLORIDE SERPL-SCNC: 105 MMOL/L (ref 98–107)
CO2 SERPL-SCNC: 23 MMOL/L (ref 21–32)
CREAT SERPL-MCNC: 0.43 MG/DL (ref 0.5–1.05)
EOSINOPHIL # BLD AUTO: 0.01 X10*3/UL (ref 0–0.4)
EOSINOPHIL NFR BLD AUTO: 0.1 %
ERYTHROCYTE [DISTWIDTH] IN BLOOD BY AUTOMATED COUNT: 14.8 % (ref 11.5–14.5)
GFR SERPL CREATININE-BSD FRML MDRD: >90 ML/MIN/1.73M*2
GLUCOSE SERPL-MCNC: 121 MG/DL (ref 74–99)
HCT VFR BLD AUTO: 34.7 % (ref 36–46)
HGB BLD-MCNC: 11.2 G/DL (ref 12–16)
IMM GRANULOCYTES # BLD AUTO: 0.03 X10*3/UL (ref 0–0.5)
IMM GRANULOCYTES NFR BLD AUTO: 0.3 % (ref 0–0.9)
LYMPHOCYTES # BLD AUTO: 1.23 X10*3/UL (ref 0.8–3)
LYMPHOCYTES NFR BLD AUTO: 12 %
MAGNESIUM SERPL-MCNC: 1.79 MG/DL (ref 1.6–2.4)
MCH RBC QN AUTO: 27.3 PG (ref 26–34)
MCHC RBC AUTO-ENTMCNC: 32.3 G/DL (ref 32–36)
MCV RBC AUTO: 85 FL (ref 80–100)
MONOCYTES # BLD AUTO: 0.79 X10*3/UL (ref 0.05–0.8)
MONOCYTES NFR BLD AUTO: 7.7 %
NEUTROPHILS # BLD AUTO: 8.16 X10*3/UL (ref 1.6–5.5)
NEUTROPHILS NFR BLD AUTO: 79.5 %
NRBC BLD-RTO: 0 /100 WBCS (ref 0–0)
P AXIS: 68 DEGREES
P OFFSET: 205 MS
P ONSET: 145 MS
PLATELET # BLD AUTO: 211 X10*3/UL (ref 150–450)
POTASSIUM SERPL-SCNC: 3.9 MMOL/L (ref 3.5–5.3)
PR INTERVAL: 140 MS
PROT SERPL-MCNC: 6.8 G/DL (ref 6.4–8.2)
PROT SERPL-MCNC: 6.9 G/DL (ref 6.4–8.2)
PROT UR-ACNC: 14 MG/DL (ref 5–25)
Q ONSET: 215 MS
QRS COUNT: 18 BEATS
QRS DURATION: 84 MS
QT INTERVAL: 320 MS
QTC CALCULATION(BAZETT): 425 MS
QTC FREDERICIA: 386 MS
R AXIS: 51 DEGREES
RBC # BLD AUTO: 4.1 X10*6/UL (ref 4–5.2)
RH FACTOR (ANTIGEN D): NORMAL
SODIUM SERPL-SCNC: 140 MMOL/L (ref 136–145)
T AXIS: 45 DEGREES
T OFFSET: 375 MS
VENTRICULAR RATE: 106 BPM
WBC # BLD AUTO: 10.3 X10*3/UL (ref 4.4–11.3)

## 2023-11-16 PROCEDURE — 2720000007 HC OR 272 NO HCPCS: Performed by: STUDENT IN AN ORGANIZED HEALTH CARE EDUCATION/TRAINING PROGRAM

## 2023-11-16 PROCEDURE — C1776 JOINT DEVICE (IMPLANTABLE): HCPCS | Performed by: STUDENT IN AN ORGANIZED HEALTH CARE EDUCATION/TRAINING PROGRAM

## 2023-11-16 PROCEDURE — 93005 ELECTROCARDIOGRAM TRACING: CPT

## 2023-11-16 PROCEDURE — 2500000004 HC RX 250 GENERAL PHARMACY W/ HCPCS (ALT 636 FOR OP/ED)

## 2023-11-16 PROCEDURE — 99222 1ST HOSP IP/OBS MODERATE 55: CPT | Performed by: STUDENT IN AN ORGANIZED HEALTH CARE EDUCATION/TRAINING PROGRAM

## 2023-11-16 PROCEDURE — 78306 BONE IMAGING WHOLE BODY: CPT | Performed by: NUCLEAR MEDICINE

## 2023-11-16 PROCEDURE — 76000 FLUOROSCOPY <1 HR PHYS/QHP: CPT

## 2023-11-16 PROCEDURE — C1769 GUIDE WIRE: HCPCS | Performed by: STUDENT IN AN ORGANIZED HEALTH CARE EDUCATION/TRAINING PROGRAM

## 2023-11-16 PROCEDURE — 27513 TREATMENT OF THIGH FRACTURE: CPT | Performed by: STUDENT IN AN ORGANIZED HEALTH CARE EDUCATION/TRAINING PROGRAM

## 2023-11-16 PROCEDURE — 85025 COMPLETE CBC W/AUTO DIFF WBC: CPT | Performed by: STUDENT IN AN ORGANIZED HEALTH CARE EDUCATION/TRAINING PROGRAM

## 2023-11-16 PROCEDURE — 3600000004 HC OR TIME - INITIAL BASE CHARGE - PROCEDURE LEVEL FOUR: Performed by: STUDENT IN AN ORGANIZED HEALTH CARE EDUCATION/TRAINING PROGRAM

## 2023-11-16 PROCEDURE — C1713 ANCHOR/SCREW BN/BN,TIS/BN: HCPCS | Performed by: STUDENT IN AN ORGANIZED HEALTH CARE EDUCATION/TRAINING PROGRAM

## 2023-11-16 PROCEDURE — A9503 TC99M MEDRONATE: HCPCS | Performed by: ORTHOPAEDIC SURGERY

## 2023-11-16 PROCEDURE — 88305 TISSUE EXAM BY PATHOLOGIST: CPT | Mod: TC,SUR | Performed by: STUDENT IN AN ORGANIZED HEALTH CARE EDUCATION/TRAINING PROGRAM

## 2023-11-16 PROCEDURE — 2500000005 HC RX 250 GENERAL PHARMACY W/O HCPCS: Performed by: STUDENT IN AN ORGANIZED HEALTH CARE EDUCATION/TRAINING PROGRAM

## 2023-11-16 PROCEDURE — 88305 TISSUE EXAM BY PATHOLOGIST: CPT | Performed by: STUDENT IN AN ORGANIZED HEALTH CARE EDUCATION/TRAINING PROGRAM

## 2023-11-16 PROCEDURE — 7100000001 HC RECOVERY ROOM TIME - INITIAL BASE CHARGE: Performed by: STUDENT IN AN ORGANIZED HEALTH CARE EDUCATION/TRAINING PROGRAM

## 2023-11-16 PROCEDURE — 86850 RBC ANTIBODY SCREEN: CPT

## 2023-11-16 PROCEDURE — 0QBC0ZX EXCISION OF LEFT LOWER FEMUR, OPEN APPROACH, DIAGNOSTIC: ICD-10-PCS | Performed by: STUDENT IN AN ORGANIZED HEALTH CARE EDUCATION/TRAINING PROGRAM

## 2023-11-16 PROCEDURE — A27513 PR OPEN TX FEMORAL SUPRACONDYLAR FRACTURE W EXTENSION: Performed by: ANESTHESIOLOGY

## 2023-11-16 PROCEDURE — 2500000005 HC RX 250 GENERAL PHARMACY W/O HCPCS

## 2023-11-16 PROCEDURE — 0QSC04Z REPOSITION LEFT LOWER FEMUR WITH INTERNAL FIXATION DEVICE, OPEN APPROACH: ICD-10-PCS | Performed by: STUDENT IN AN ORGANIZED HEALTH CARE EDUCATION/TRAINING PROGRAM

## 2023-11-16 PROCEDURE — 3700000001 HC GENERAL ANESTHESIA TIME - INITIAL BASE CHARGE: Performed by: STUDENT IN AN ORGANIZED HEALTH CARE EDUCATION/TRAINING PROGRAM

## 2023-11-16 PROCEDURE — 83735 ASSAY OF MAGNESIUM: CPT | Performed by: STUDENT IN AN ORGANIZED HEALTH CARE EDUCATION/TRAINING PROGRAM

## 2023-11-16 PROCEDURE — 2780000003 HC OR 278 NO HCPCS: Performed by: STUDENT IN AN ORGANIZED HEALTH CARE EDUCATION/TRAINING PROGRAM

## 2023-11-16 PROCEDURE — 80053 COMPREHEN METABOLIC PANEL: CPT | Performed by: STUDENT IN AN ORGANIZED HEALTH CARE EDUCATION/TRAINING PROGRAM

## 2023-11-16 PROCEDURE — 3700000002 HC GENERAL ANESTHESIA TIME - EACH INCREMENTAL 1 MINUTE: Performed by: STUDENT IN AN ORGANIZED HEALTH CARE EDUCATION/TRAINING PROGRAM

## 2023-11-16 PROCEDURE — 2500000004 HC RX 250 GENERAL PHARMACY W/ HCPCS (ALT 636 FOR OP/ED): Performed by: STUDENT IN AN ORGANIZED HEALTH CARE EDUCATION/TRAINING PROGRAM

## 2023-11-16 PROCEDURE — 36415 COLL VENOUS BLD VENIPUNCTURE: CPT | Performed by: STUDENT IN AN ORGANIZED HEALTH CARE EDUCATION/TRAINING PROGRAM

## 2023-11-16 PROCEDURE — 0QUC0JZ SUPPLEMENT LEFT LOWER FEMUR WITH SYNTHETIC SUBSTITUTE, OPEN APPROACH: ICD-10-PCS | Performed by: STUDENT IN AN ORGANIZED HEALTH CARE EDUCATION/TRAINING PROGRAM

## 2023-11-16 PROCEDURE — A4217 STERILE WATER/SALINE, 500 ML: HCPCS | Performed by: STUDENT IN AN ORGANIZED HEALTH CARE EDUCATION/TRAINING PROGRAM

## 2023-11-16 PROCEDURE — 3430000001 HC RX 343 DIAGNOSTIC RADIOPHARMACEUTICALS: Performed by: ORTHOPAEDIC SURGERY

## 2023-11-16 PROCEDURE — 78306 BONE IMAGING WHOLE BODY: CPT

## 2023-11-16 PROCEDURE — 36415 COLL VENOUS BLD VENIPUNCTURE: CPT

## 2023-11-16 PROCEDURE — 88331 PATH CONSLTJ SURG 1 BLK 1SPC: CPT | Performed by: STUDENT IN AN ORGANIZED HEALTH CARE EDUCATION/TRAINING PROGRAM

## 2023-11-16 PROCEDURE — 1100000001 HC PRIVATE ROOM DAILY

## 2023-11-16 PROCEDURE — 27356 REMOVE FEMUR LESION/GRAFT: CPT | Performed by: STUDENT IN AN ORGANIZED HEALTH CARE EDUCATION/TRAINING PROGRAM

## 2023-11-16 PROCEDURE — 86901 BLOOD TYPING SEROLOGIC RH(D): CPT

## 2023-11-16 PROCEDURE — 99100 ANES PT EXTEME AGE<1 YR&>70: CPT | Performed by: ANESTHESIOLOGY

## 2023-11-16 PROCEDURE — 93010 ELECTROCARDIOGRAM REPORT: CPT | Performed by: INTERNAL MEDICINE

## 2023-11-16 PROCEDURE — 7100000002 HC RECOVERY ROOM TIME - EACH INCREMENTAL 1 MINUTE: Performed by: STUDENT IN AN ORGANIZED HEALTH CARE EDUCATION/TRAINING PROGRAM

## 2023-11-16 PROCEDURE — 3600000009 HC OR TIME - EACH INCREMENTAL 1 MINUTE - PROCEDURE LEVEL FOUR: Performed by: STUDENT IN AN ORGANIZED HEALTH CARE EDUCATION/TRAINING PROGRAM

## 2023-11-16 PROCEDURE — 20245 BONE BIOPSY OPEN DEEP: CPT | Performed by: STUDENT IN AN ORGANIZED HEALTH CARE EDUCATION/TRAINING PROGRAM

## 2023-11-16 DEVICE — SCREW, CORTICAL, SELF-TAPPING, 4.5 X 32 MM, STAINLESS STEEL: Type: IMPLANTABLE DEVICE | Site: FEMUR | Status: FUNCTIONAL

## 2023-11-16 DEVICE — TOBRA FULL DOSE ANTIBIOTIC BONE CEMENT, 10 PACK CATALOG NUMBER IS 6197-9-010
Type: IMPLANTABLE DEVICE | Site: FEMUR | Status: FUNCTIONAL
Brand: SIMPLEX

## 2023-11-16 DEVICE — IMPLANTABLE DEVICE: Type: IMPLANTABLE DEVICE | Site: FEMUR | Status: FUNCTIONAL

## 2023-11-16 DEVICE — SCREW, CORTICAL, SELF-TAPPING, 4.5 X 40 MM, STAINLESS STEEL: Type: IMPLANTABLE DEVICE | Site: FEMUR | Status: FUNCTIONAL

## 2023-11-16 DEVICE — SCREW, CORTICAL, SELF-TAPPING, 4.5 X 34 MM, STAINLESS STEEL: Type: IMPLANTABLE DEVICE | Site: FEMUR | Status: FUNCTIONAL

## 2023-11-16 RX ORDER — SODIUM CHLORIDE 0.9 G/100ML
IRRIGANT IRRIGATION AS NEEDED
Status: DISCONTINUED | OUTPATIENT
Start: 2023-11-16 | End: 2023-11-16 | Stop reason: HOSPADM

## 2023-11-16 RX ORDER — ASPIRIN 81 MG/1
81 TABLET ORAL 2 TIMES DAILY
Status: DISCONTINUED | OUTPATIENT
Start: 2023-11-16 | End: 2023-11-18 | Stop reason: HOSPADM

## 2023-11-16 RX ORDER — HYDROMORPHONE HYDROCHLORIDE 1 MG/ML
0.2 INJECTION, SOLUTION INTRAMUSCULAR; INTRAVENOUS; SUBCUTANEOUS EVERY 5 MIN PRN
Status: DISCONTINUED | OUTPATIENT
Start: 2023-11-16 | End: 2023-11-16 | Stop reason: HOSPADM

## 2023-11-16 RX ORDER — OXYCODONE HYDROCHLORIDE 5 MG/1
10 TABLET ORAL EVERY 4 HOURS PRN
Status: DISCONTINUED | OUTPATIENT
Start: 2023-11-16 | End: 2023-11-18 | Stop reason: HOSPADM

## 2023-11-16 RX ORDER — DIPHENHYDRAMINE HYDROCHLORIDE 50 MG/ML
25 INJECTION INTRAMUSCULAR; INTRAVENOUS ONCE AS NEEDED
Status: DISCONTINUED | OUTPATIENT
Start: 2023-11-16 | End: 2023-11-16 | Stop reason: HOSPADM

## 2023-11-16 RX ORDER — METOPROLOL TARTRATE 1 MG/ML
INJECTION, SOLUTION INTRAVENOUS AS NEEDED
Status: DISCONTINUED | OUTPATIENT
Start: 2023-11-16 | End: 2023-11-16

## 2023-11-16 RX ORDER — AMOXICILLIN 250 MG
2 CAPSULE ORAL 2 TIMES DAILY
Status: DISCONTINUED | OUTPATIENT
Start: 2023-11-16 | End: 2023-11-18 | Stop reason: HOSPADM

## 2023-11-16 RX ORDER — TRANEXAMIC ACID 100 MG/ML
INJECTION, SOLUTION INTRAVENOUS AS NEEDED
Status: DISCONTINUED | OUTPATIENT
Start: 2023-11-16 | End: 2023-11-16

## 2023-11-16 RX ORDER — ADHESIVE BANDAGE
30 BANDAGE TOPICAL DAILY PRN
Status: DISCONTINUED | OUTPATIENT
Start: 2023-11-16 | End: 2023-11-18 | Stop reason: HOSPADM

## 2023-11-16 RX ORDER — TOBRAMYCIN 1.2 G/30ML
INJECTION, POWDER, LYOPHILIZED, FOR SOLUTION INTRAVENOUS AS NEEDED
Status: DISCONTINUED | OUTPATIENT
Start: 2023-11-16 | End: 2023-11-16 | Stop reason: HOSPADM

## 2023-11-16 RX ORDER — CHOLECALCIFEROL (VITAMIN D3) 25 MCG
5000 TABLET ORAL DAILY
Status: DISCONTINUED | OUTPATIENT
Start: 2023-11-16 | End: 2023-11-18 | Stop reason: HOSPADM

## 2023-11-16 RX ORDER — SODIUM CHLORIDE, SODIUM LACTATE, POTASSIUM CHLORIDE, CALCIUM CHLORIDE 600; 310; 30; 20 MG/100ML; MG/100ML; MG/100ML; MG/100ML
INJECTION, SOLUTION INTRAVENOUS CONTINUOUS PRN
Status: DISCONTINUED | OUTPATIENT
Start: 2023-11-16 | End: 2023-11-16

## 2023-11-16 RX ORDER — PANTOPRAZOLE SODIUM 40 MG/1
40 TABLET, DELAYED RELEASE ORAL
Status: DISCONTINUED | OUTPATIENT
Start: 2023-11-16 | End: 2023-11-18 | Stop reason: HOSPADM

## 2023-11-16 RX ORDER — ONDANSETRON 4 MG/1
4 TABLET, ORALLY DISINTEGRATING ORAL EVERY 8 HOURS PRN
Status: DISCONTINUED | OUTPATIENT
Start: 2023-11-16 | End: 2023-11-18 | Stop reason: HOSPADM

## 2023-11-16 RX ORDER — HYDROMORPHONE HYDROCHLORIDE 1 MG/ML
0.5 INJECTION, SOLUTION INTRAMUSCULAR; INTRAVENOUS; SUBCUTANEOUS EVERY 4 HOURS PRN
Status: DISCONTINUED | OUTPATIENT
Start: 2023-11-16 | End: 2023-11-18 | Stop reason: HOSPADM

## 2023-11-16 RX ORDER — CEFAZOLIN 1 G/1
INJECTION, POWDER, FOR SOLUTION INTRAVENOUS AS NEEDED
Status: DISCONTINUED | OUTPATIENT
Start: 2023-11-16 | End: 2023-11-16

## 2023-11-16 RX ORDER — CYCLOBENZAPRINE HCL 10 MG
10 TABLET ORAL 3 TIMES DAILY PRN
Status: DISCONTINUED | OUTPATIENT
Start: 2023-11-16 | End: 2023-11-18 | Stop reason: HOSPADM

## 2023-11-16 RX ORDER — NALOXONE HYDROCHLORIDE 0.4 MG/ML
0.2 INJECTION, SOLUTION INTRAMUSCULAR; INTRAVENOUS; SUBCUTANEOUS EVERY 5 MIN PRN
Status: DISCONTINUED | OUTPATIENT
Start: 2023-11-16 | End: 2023-11-17

## 2023-11-16 RX ORDER — DEXAMETHASONE SODIUM PHOSPHATE 4 MG/ML
INJECTION, SOLUTION INTRA-ARTICULAR; INTRALESIONAL; INTRAMUSCULAR; INTRAVENOUS; SOFT TISSUE AS NEEDED
Status: DISCONTINUED | OUTPATIENT
Start: 2023-11-16 | End: 2023-11-16

## 2023-11-16 RX ORDER — OXYCODONE HYDROCHLORIDE 5 MG/1
10 TABLET ORAL EVERY 4 HOURS PRN
Status: DISCONTINUED | OUTPATIENT
Start: 2023-11-16 | End: 2023-11-16 | Stop reason: HOSPADM

## 2023-11-16 RX ORDER — PROPOFOL 10 MG/ML
INJECTION, EMULSION INTRAVENOUS AS NEEDED
Status: DISCONTINUED | OUTPATIENT
Start: 2023-11-16 | End: 2023-11-16

## 2023-11-16 RX ORDER — ONDANSETRON HYDROCHLORIDE 2 MG/ML
4 INJECTION, SOLUTION INTRAVENOUS EVERY 8 HOURS PRN
Status: DISCONTINUED | OUTPATIENT
Start: 2023-11-16 | End: 2023-11-18 | Stop reason: HOSPADM

## 2023-11-16 RX ORDER — OXYCODONE HYDROCHLORIDE 5 MG/1
5 TABLET ORAL EVERY 6 HOURS PRN
Status: DISCONTINUED | OUTPATIENT
Start: 2023-11-16 | End: 2023-11-17

## 2023-11-16 RX ORDER — ALBUTEROL SULFATE 0.83 MG/ML
2.5 SOLUTION RESPIRATORY (INHALATION) ONCE AS NEEDED
Status: DISCONTINUED | OUTPATIENT
Start: 2023-11-16 | End: 2023-11-16 | Stop reason: HOSPADM

## 2023-11-16 RX ORDER — PROCHLORPERAZINE 25 MG/1
25 SUPPOSITORY RECTAL EVERY 12 HOURS PRN
Status: DISCONTINUED | OUTPATIENT
Start: 2023-11-16 | End: 2023-11-18 | Stop reason: HOSPADM

## 2023-11-16 RX ORDER — PROCHLORPERAZINE MALEATE 10 MG
10 TABLET ORAL EVERY 6 HOURS PRN
Status: DISCONTINUED | OUTPATIENT
Start: 2023-11-16 | End: 2023-11-18 | Stop reason: HOSPADM

## 2023-11-16 RX ORDER — SODIUM CHLORIDE, SODIUM LACTATE, POTASSIUM CHLORIDE, CALCIUM CHLORIDE 600; 310; 30; 20 MG/100ML; MG/100ML; MG/100ML; MG/100ML
100 INJECTION, SOLUTION INTRAVENOUS CONTINUOUS
Status: DISCONTINUED | OUTPATIENT
Start: 2023-11-16 | End: 2023-11-16 | Stop reason: HOSPADM

## 2023-11-16 RX ORDER — POLYETHYLENE GLYCOL 3350 17 G/17G
17 POWDER, FOR SOLUTION ORAL DAILY
Status: DISCONTINUED | OUTPATIENT
Start: 2023-11-16 | End: 2023-11-17

## 2023-11-16 RX ORDER — ONDANSETRON HYDROCHLORIDE 2 MG/ML
4 INJECTION, SOLUTION INTRAVENOUS ONCE AS NEEDED
Status: DISCONTINUED | OUTPATIENT
Start: 2023-11-16 | End: 2023-11-16 | Stop reason: HOSPADM

## 2023-11-16 RX ORDER — NALOXONE HYDROCHLORIDE 0.4 MG/ML
0.2 INJECTION, SOLUTION INTRAMUSCULAR; INTRAVENOUS; SUBCUTANEOUS EVERY 5 MIN PRN
Status: DISCONTINUED | OUTPATIENT
Start: 2023-11-16 | End: 2023-11-18 | Stop reason: HOSPADM

## 2023-11-16 RX ORDER — ACETAMINOPHEN 325 MG/1
650 TABLET ORAL EVERY 6 HOURS SCHEDULED
Status: DISCONTINUED | OUTPATIENT
Start: 2023-11-17 | End: 2023-11-18 | Stop reason: HOSPADM

## 2023-11-16 RX ORDER — ACETAMINOPHEN 325 MG/1
650 TABLET ORAL EVERY 4 HOURS PRN
Status: DISCONTINUED | OUTPATIENT
Start: 2023-11-16 | End: 2023-11-16 | Stop reason: HOSPADM

## 2023-11-16 RX ORDER — BISACODYL 10 MG/1
10 SUPPOSITORY RECTAL DAILY PRN
Status: DISCONTINUED | OUTPATIENT
Start: 2023-11-16 | End: 2023-11-18 | Stop reason: HOSPADM

## 2023-11-16 RX ORDER — DIPHENHYDRAMINE HYDROCHLORIDE 50 MG/ML
12.5 INJECTION INTRAMUSCULAR; INTRAVENOUS EVERY 6 HOURS PRN
Status: DISCONTINUED | OUTPATIENT
Start: 2023-11-16 | End: 2023-11-18 | Stop reason: HOSPADM

## 2023-11-16 RX ORDER — ONDANSETRON 4 MG/1
4 TABLET, FILM COATED ORAL EVERY 8 HOURS PRN
Status: DISCONTINUED | OUTPATIENT
Start: 2023-11-16 | End: 2023-11-18 | Stop reason: HOSPADM

## 2023-11-16 RX ORDER — ROCURONIUM BROMIDE 10 MG/ML
INJECTION, SOLUTION INTRAVENOUS AS NEEDED
Status: DISCONTINUED | OUTPATIENT
Start: 2023-11-16 | End: 2023-11-16

## 2023-11-16 RX ORDER — OXYCODONE HYDROCHLORIDE 5 MG/1
10 TABLET ORAL EVERY 4 HOURS PRN
Status: DISCONTINUED | OUTPATIENT
Start: 2023-11-16 | End: 2023-11-17

## 2023-11-16 RX ORDER — OXYCODONE HYDROCHLORIDE 5 MG/1
5 TABLET ORAL EVERY 4 HOURS PRN
Status: DISCONTINUED | OUTPATIENT
Start: 2023-11-16 | End: 2023-11-18 | Stop reason: HOSPADM

## 2023-11-16 RX ORDER — ERGOCALCIFEROL 1.25 MG/1
1250 CAPSULE ORAL DAILY
Status: DISCONTINUED | OUTPATIENT
Start: 2023-11-17 | End: 2023-11-17

## 2023-11-16 RX ORDER — SODIUM CHLORIDE, SODIUM LACTATE, POTASSIUM CHLORIDE, CALCIUM CHLORIDE 600; 310; 30; 20 MG/100ML; MG/100ML; MG/100ML; MG/100ML
100 INJECTION, SOLUTION INTRAVENOUS CONTINUOUS
Status: ACTIVE | OUTPATIENT
Start: 2023-11-16 | End: 2023-11-17

## 2023-11-16 RX ORDER — PHENYLEPHRINE HCL IN 0.9% NACL 0.4MG/10ML
SYRINGE (ML) INTRAVENOUS AS NEEDED
Status: DISCONTINUED | OUTPATIENT
Start: 2023-11-16 | End: 2023-11-16

## 2023-11-16 RX ORDER — CEFAZOLIN SODIUM 2 G/100ML
2 INJECTION, SOLUTION INTRAVENOUS EVERY 8 HOURS
Status: COMPLETED | OUTPATIENT
Start: 2023-11-17 | End: 2023-11-17

## 2023-11-16 RX ORDER — POLYETHYLENE GLYCOL 3350 17 G/17G
17 POWDER, FOR SOLUTION ORAL DAILY
Status: DISCONTINUED | OUTPATIENT
Start: 2023-11-17 | End: 2023-11-18 | Stop reason: HOSPADM

## 2023-11-16 RX ORDER — LIDOCAINE HYDROCHLORIDE 10 MG/ML
0.1 INJECTION, SOLUTION EPIDURAL; INFILTRATION; INTRACAUDAL; PERINEURAL ONCE
Status: DISCONTINUED | OUTPATIENT
Start: 2023-11-16 | End: 2023-11-16 | Stop reason: HOSPADM

## 2023-11-16 RX ORDER — MIDAZOLAM HYDROCHLORIDE 1 MG/ML
INJECTION INTRAMUSCULAR; INTRAVENOUS AS NEEDED
Status: DISCONTINUED | OUTPATIENT
Start: 2023-11-16 | End: 2023-11-16

## 2023-11-16 RX ORDER — HYDROMORPHONE HYDROCHLORIDE 1 MG/ML
0.5 INJECTION, SOLUTION INTRAMUSCULAR; INTRAVENOUS; SUBCUTANEOUS EVERY 5 MIN PRN
Status: DISCONTINUED | OUTPATIENT
Start: 2023-11-16 | End: 2023-11-16 | Stop reason: HOSPADM

## 2023-11-16 RX ORDER — PROCHLORPERAZINE EDISYLATE 5 MG/ML
10 INJECTION INTRAMUSCULAR; INTRAVENOUS EVERY 6 HOURS PRN
Status: DISCONTINUED | OUTPATIENT
Start: 2023-11-16 | End: 2023-11-18 | Stop reason: HOSPADM

## 2023-11-16 RX ORDER — ACETAMINOPHEN 325 MG/1
650 TABLET ORAL EVERY 6 HOURS SCHEDULED
Status: DISCONTINUED | OUTPATIENT
Start: 2023-11-16 | End: 2023-11-17

## 2023-11-16 RX ORDER — CEFAZOLIN SODIUM 1 G/50ML
SOLUTION INTRAVENOUS AS NEEDED
Status: DISCONTINUED | OUTPATIENT
Start: 2023-11-16 | End: 2023-11-16

## 2023-11-16 RX ORDER — HYDROMORPHONE HYDROCHLORIDE 1 MG/ML
0.2 INJECTION, SOLUTION INTRAMUSCULAR; INTRAVENOUS; SUBCUTANEOUS EVERY 2 HOUR PRN
Status: DISCONTINUED | OUTPATIENT
Start: 2023-11-16 | End: 2023-11-17

## 2023-11-16 RX ORDER — FERROUS SULFATE, DRIED 160(50) MG
1 TABLET, EXTENDED RELEASE ORAL 2 TIMES DAILY
Status: DISCONTINUED | OUTPATIENT
Start: 2023-11-16 | End: 2023-11-18 | Stop reason: HOSPADM

## 2023-11-16 RX ORDER — FENTANYL CITRATE 50 UG/ML
INJECTION, SOLUTION INTRAMUSCULAR; INTRAVENOUS AS NEEDED
Status: DISCONTINUED | OUTPATIENT
Start: 2023-11-16 | End: 2023-11-16

## 2023-11-16 RX ORDER — VANCOMYCIN HYDROCHLORIDE 1 G/20ML
INJECTION, POWDER, LYOPHILIZED, FOR SOLUTION INTRAVENOUS AS NEEDED
Status: DISCONTINUED | OUTPATIENT
Start: 2023-11-16 | End: 2023-11-16 | Stop reason: HOSPADM

## 2023-11-16 RX ORDER — OXYCODONE HYDROCHLORIDE 5 MG/1
5 TABLET ORAL EVERY 4 HOURS PRN
Status: DISCONTINUED | OUTPATIENT
Start: 2023-11-16 | End: 2023-11-16 | Stop reason: HOSPADM

## 2023-11-16 RX ORDER — LIDOCAINE HCL/PF 100 MG/5ML
SYRINGE (ML) INTRAVENOUS AS NEEDED
Status: DISCONTINUED | OUTPATIENT
Start: 2023-11-16 | End: 2023-11-16

## 2023-11-16 RX ORDER — LABETALOL HYDROCHLORIDE 5 MG/ML
5 INJECTION, SOLUTION INTRAVENOUS ONCE AS NEEDED
Status: DISCONTINUED | OUTPATIENT
Start: 2023-11-16 | End: 2023-11-16 | Stop reason: HOSPADM

## 2023-11-16 RX ADMIN — LIDOCAINE HYDROCHLORIDE 100 MG: 20 INJECTION INTRAVENOUS at 17:08

## 2023-11-16 RX ADMIN — DEXAMETHASONE SODIUM PHOSPHATE 8 MG: 4 INJECTION, SOLUTION INTRA-ARTICULAR; INTRALESIONAL; INTRAMUSCULAR; INTRAVENOUS; SOFT TISSUE at 17:30

## 2023-11-16 RX ADMIN — METOPROLOL TARTRATE 1 MG: 1 INJECTION, SOLUTION INTRAVENOUS at 17:06

## 2023-11-16 RX ADMIN — HYDROMORPHONE HYDROCHLORIDE 0.2 MG: 1 INJECTION, SOLUTION INTRAMUSCULAR; INTRAVENOUS; SUBCUTANEOUS at 20:20

## 2023-11-16 RX ADMIN — Medication 80 MCG: at 17:10

## 2023-11-16 RX ADMIN — Medication: at 20:32

## 2023-11-16 RX ADMIN — ROCURONIUM BROMIDE 40 MG: 50 INJECTION, SOLUTION INTRAVENOUS at 17:12

## 2023-11-16 RX ADMIN — SODIUM CHLORIDE, POTASSIUM CHLORIDE, SODIUM LACTATE AND CALCIUM CHLORIDE 100 ML/HR: 600; 310; 30; 20 INJECTION, SOLUTION INTRAVENOUS at 22:30

## 2023-11-16 RX ADMIN — HYDROMORPHONE HYDROCHLORIDE 0.2 MG: 1 INJECTION, SOLUTION INTRAMUSCULAR; INTRAVENOUS; SUBCUTANEOUS at 20:21

## 2023-11-16 RX ADMIN — ROCURONIUM BROMIDE 10 MG: 50 INJECTION, SOLUTION INTRAVENOUS at 19:24

## 2023-11-16 RX ADMIN — ROCURONIUM BROMIDE 10 MG: 50 INJECTION, SOLUTION INTRAVENOUS at 19:52

## 2023-11-16 RX ADMIN — PROPOFOL 30 MG: 10 INJECTION, EMULSION INTRAVENOUS at 18:30

## 2023-11-16 RX ADMIN — HYDROMORPHONE HYDROCHLORIDE 0.6 MG: 1 INJECTION, SOLUTION INTRAMUSCULAR; INTRAVENOUS; SUBCUTANEOUS at 18:32

## 2023-11-16 RX ADMIN — HYDROMORPHONE HYDROCHLORIDE 0.5 MG: 1 INJECTION, SOLUTION INTRAMUSCULAR; INTRAVENOUS; SUBCUTANEOUS at 20:50

## 2023-11-16 RX ADMIN — Medication 2 L/MIN: at 22:30

## 2023-11-16 RX ADMIN — CEFAZOLIN SODIUM 2 G: 1 INJECTION, SOLUTION INTRAVENOUS at 17:27

## 2023-11-16 RX ADMIN — ROCURONIUM BROMIDE 10 MG: 50 INJECTION, SOLUTION INTRAVENOUS at 18:29

## 2023-11-16 RX ADMIN — MIDAZOLAM HYDROCHLORIDE 0.5 MG: 1 INJECTION, SOLUTION INTRAMUSCULAR; INTRAVENOUS at 16:54

## 2023-11-16 RX ADMIN — PROPOFOL 130 MG: 10 INJECTION, EMULSION INTRAVENOUS at 17:09

## 2023-11-16 RX ADMIN — METOPROLOL TARTRATE 2 MG: 1 INJECTION, SOLUTION INTRAVENOUS at 17:05

## 2023-11-16 RX ADMIN — ONDANSETRON 4 MG: 2 INJECTION INTRAMUSCULAR; INTRAVENOUS at 20:12

## 2023-11-16 RX ADMIN — HYDROMORPHONE HYDROCHLORIDE 0.4 MG: 1 INJECTION, SOLUTION INTRAMUSCULAR; INTRAVENOUS; SUBCUTANEOUS at 17:52

## 2023-11-16 RX ADMIN — TECHNETIUM TC 99M MEDRONATE 27.5 MILLICURIE: 25 INJECTION, POWDER, FOR SOLUTION INTRAVENOUS at 08:30

## 2023-11-16 RX ADMIN — ROCURONIUM BROMIDE 20 MG: 50 INJECTION, SOLUTION INTRAVENOUS at 18:57

## 2023-11-16 RX ADMIN — TRANEXAMIC ACID 850 MG: 100 INJECTION, SOLUTION INTRAVENOUS at 17:27

## 2023-11-16 RX ADMIN — SODIUM CHLORIDE, POTASSIUM CHLORIDE, SODIUM LACTATE AND CALCIUM CHLORIDE: 600; 310; 30; 20 INJECTION, SOLUTION INTRAVENOUS at 20:09

## 2023-11-16 RX ADMIN — ROCURONIUM BROMIDE 10 MG: 50 INJECTION, SOLUTION INTRAVENOUS at 17:42

## 2023-11-16 RX ADMIN — ACETAMINOPHEN 650 MG: 325 TABLET ORAL at 10:58

## 2023-11-16 RX ADMIN — SODIUM CHLORIDE, POTASSIUM CHLORIDE, SODIUM LACTATE AND CALCIUM CHLORIDE: 600; 310; 30; 20 INJECTION, SOLUTION INTRAVENOUS at 17:20

## 2023-11-16 RX ADMIN — SUGAMMADEX 200 MG: 100 INJECTION, SOLUTION INTRAVENOUS at 20:16

## 2023-11-16 RX ADMIN — ROCURONIUM BROMIDE 10 MG: 50 INJECTION, SOLUTION INTRAVENOUS at 18:19

## 2023-11-16 RX ADMIN — SODIUM CHLORIDE, SODIUM LACTATE, POTASSIUM CHLORIDE, CALCIUM CHLORIDE: 600; 310; 30; 20 INJECTION, SOLUTION INTRAVENOUS at 16:45

## 2023-11-16 RX ADMIN — Medication 80 MCG: at 17:11

## 2023-11-16 RX ADMIN — FENTANYL CITRATE 100 MCG: 50 INJECTION, SOLUTION INTRAMUSCULAR; INTRAVENOUS at 17:08

## 2023-11-16 SDOH — SOCIAL STABILITY: SOCIAL INSECURITY: DO YOU FEEL ANYONE HAS EXPLOITED OR TAKEN ADVANTAGE OF YOU FINANCIALLY OR OF YOUR PERSONAL PROPERTY?: NO

## 2023-11-16 SDOH — SOCIAL STABILITY: SOCIAL INSECURITY: ARE YOU OR HAVE YOU BEEN THREATENED OR ABUSED PHYSICALLY, EMOTIONALLY, OR SEXUALLY BY ANYONE?: NO

## 2023-11-16 SDOH — SOCIAL STABILITY: SOCIAL INSECURITY: HAS ANYONE EVER THREATENED TO HURT YOUR FAMILY OR YOUR PETS?: NO

## 2023-11-16 SDOH — SOCIAL STABILITY: SOCIAL INSECURITY: DOES ANYONE TRY TO KEEP YOU FROM HAVING/CONTACTING OTHER FRIENDS OR DOING THINGS OUTSIDE YOUR HOME?: NO

## 2023-11-16 SDOH — SOCIAL STABILITY: SOCIAL INSECURITY: ARE THERE ANY APPARENT SIGNS OF INJURIES/BEHAVIORS THAT COULD BE RELATED TO ABUSE/NEGLECT?: NO

## 2023-11-16 SDOH — SOCIAL STABILITY: SOCIAL INSECURITY: DO YOU FEEL UNSAFE GOING BACK TO THE PLACE WHERE YOU ARE LIVING?: NO

## 2023-11-16 SDOH — SOCIAL STABILITY: SOCIAL INSECURITY: HAVE YOU HAD THOUGHTS OF HARMING ANYONE ELSE?: NO

## 2023-11-16 SDOH — HEALTH STABILITY: MENTAL HEALTH: CURRENT SMOKER: 0

## 2023-11-16 ASSESSMENT — ACTIVITIES OF DAILY LIVING (ADL)
JUDGMENT_ADEQUATE_SAFELY_COMPLETE_DAILY_ACTIVITIES: YES
GROOMING: NEEDS ASSISTANCE
WALKS IN HOME: INDEPENDENT
ADEQUATE_TO_COMPLETE_ADL: YES
LACK_OF_TRANSPORTATION: NO
FEEDING YOURSELF: INDEPENDENT
BATHING: NEEDS ASSISTANCE
PATIENT'S MEMORY ADEQUATE TO SAFELY COMPLETE DAILY ACTIVITIES?: YES
HEARING - RIGHT EAR: FUNCTIONAL
HEARING - LEFT EAR: FUNCTIONAL
DRESSING YOURSELF: NEEDS ASSISTANCE
ASSISTIVE_DEVICE: WALKER
TOILETING: NEEDS ASSISTANCE

## 2023-11-16 ASSESSMENT — PAIN - FUNCTIONAL ASSESSMENT
PAIN_FUNCTIONAL_ASSESSMENT: 0-10

## 2023-11-16 ASSESSMENT — LIFESTYLE VARIABLES
HAVE PEOPLE ANNOYED YOU BY CRITICIZING YOUR DRINKING: NO
SKIP TO QUESTIONS 9-10: 1
AUDIT-C TOTAL SCORE: 0
HOW MANY STANDARD DRINKS CONTAINING ALCOHOL DO YOU HAVE ON A TYPICAL DAY: PATIENT DOES NOT DRINK
HOW OFTEN DO YOU HAVE 6 OR MORE DRINKS ON ONE OCCASION: NEVER
EVER FELT BAD OR GUILTY ABOUT YOUR DRINKING: NO
HAVE YOU EVER FELT YOU SHOULD CUT DOWN ON YOUR DRINKING: NO
EVER HAD A DRINK FIRST THING IN THE MORNING TO STEADY YOUR NERVES TO GET RID OF A HANGOVER: NO
SUBSTANCE_ABUSE_PAST_12_MONTHS: NO
REASON UNABLE TO ASSESS: NO
HOW OFTEN DO YOU HAVE A DRINK CONTAINING ALCOHOL: NEVER
PRESCIPTION_ABUSE_PAST_12_MONTHS: NO
AUDIT-C TOTAL SCORE: 0

## 2023-11-16 ASSESSMENT — COGNITIVE AND FUNCTIONAL STATUS - GENERAL
CLIMB 3 TO 5 STEPS WITH RAILING: TOTAL
TURNING FROM BACK TO SIDE WHILE IN FLAT BAD: A LOT
TOILETING: A LOT
PATIENT BASELINE BEDBOUND: YES
PERSONAL GROOMING: A LITTLE
HELP NEEDED FOR BATHING: A LOT
STANDING UP FROM CHAIR USING ARMS: TOTAL
MOBILITY SCORE: 9
MOVING TO AND FROM BED TO CHAIR: TOTAL
DRESSING REGULAR LOWER BODY CLOTHING: A LOT
WALKING IN HOSPITAL ROOM: TOTAL
MOVING FROM LYING ON BACK TO SITTING ON SIDE OF FLAT BED WITH BEDRAILS: A LITTLE
DAILY ACTIVITIY SCORE: 15
DRESSING REGULAR UPPER BODY CLOTHING: A LOT

## 2023-11-16 ASSESSMENT — PAIN SCALES - GENERAL
PAINLEVEL_OUTOF10: 5 - MODERATE PAIN
PAINLEVEL_OUTOF10: 4
PAINLEVEL_OUTOF10: 10 - WORST POSSIBLE PAIN
PAINLEVEL_OUTOF10: 0 - NO PAIN
PAINLEVEL_OUTOF10: 0 - NO PAIN
PAINLEVEL_OUTOF10: 10 - WORST POSSIBLE PAIN
PAIN_LEVEL: 0
PAINLEVEL_OUTOF10: 0 - NO PAIN
PAINLEVEL_OUTOF10: 10 - WORST POSSIBLE PAIN

## 2023-11-16 ASSESSMENT — PAIN DESCRIPTION - ORIENTATION: ORIENTATION: LEFT

## 2023-11-16 ASSESSMENT — COLUMBIA-SUICIDE SEVERITY RATING SCALE - C-SSRS
2. HAVE YOU ACTUALLY HAD ANY THOUGHTS OF KILLING YOURSELF?: NO
1. IN THE PAST MONTH, HAVE YOU WISHED YOU WERE DEAD OR WISHED YOU COULD GO TO SLEEP AND NOT WAKE UP?: NO
6. HAVE YOU EVER DONE ANYTHING, STARTED TO DO ANYTHING, OR PREPARED TO DO ANYTHING TO END YOUR LIFE?: NO

## 2023-11-16 ASSESSMENT — PAIN DESCRIPTION - LOCATION: LOCATION: LEG

## 2023-11-16 ASSESSMENT — PAIN SCALES - WONG BAKER: WONGBAKER_NUMERICALRESPONSE: HURTS WORST

## 2023-11-16 ASSESSMENT — PATIENT HEALTH QUESTIONNAIRE - PHQ9
1. LITTLE INTEREST OR PLEASURE IN DOING THINGS: NOT AT ALL
SUM OF ALL RESPONSES TO PHQ9 QUESTIONS 1 & 2: 0
2. FEELING DOWN, DEPRESSED OR HOPELESS: NOT AT ALL

## 2023-11-16 NOTE — H&P
ORTHOPAEDIC HISTORY AND PHYSICAL    History Of Present Illness  Orthopaedic Problems/Injuries:  Blanquita Morales is a 73 y.o. female presenting with Pathologic L distal femur fx.    Injury: Pathologic L distal femur fx   HPI: 73F (HLD, psoriasis, latent TB) p/a fall. Antecedent knee pain x 2 months treated with CSI by sports med. Denies systemic symptoms. Ambulates w walker at baseline.    Location: Painful at L knee  Duration: Pain has been worse since fall  Severity: 8 /10  Worsened by movement/Palpation, improved with rest and pain medication  Open/Closed: Closed, NVI: NVI  Associated symptoms: no associated numbness/tingling/weakness    Other Injuries: none  NPO: pending OR timing    Past medical history: per HPI; no history of blood clots  Past surgical history: per HPI, rest reviewed in EMR  Allergies: none  Medications: denies AC - rest per EMR  Social History: denies smoking, denies drinking, denies IVDU  Family History:  Non-contributory to this patient's acute surgical issue.    Review of Systems: 12 point ROS negative unless stated in HPI    Past Medical History  She has a past medical history of Abnormal levels of other serum enzymes (08/31/2016), Acute maxillary sinusitis, unspecified (11/30/2016), Acute pharyngitis, unspecified (05/06/2015), Cardiomegaly (08/31/2016), Encounter for gynecological examination (general) (routine) without abnormal findings (08/31/2016), Infectious gastroenteritis and colitis, unspecified (06/23/2017), Medial epicondylitis, left elbow (01/23/2017), Nausea (09/02/2014), Otalgia, bilateral (05/06/2015), Other abnormal and inconclusive findings on diagnostic imaging of breast (08/31/2016), Other conditions influencing health status, Palpitations (08/31/2016), Paroxysmal atrial fibrillation (CMS/HCC) (05/29/2020), Personal history of other diseases of the circulatory system (08/31/2016), Personal history of other diseases of the circulatory system (08/31/2016), Personal history  "of other diseases of the digestive system, Personal history of other diseases of the digestive system (03/30/2015), Personal history of other diseases of the nervous system and sense organs (11/30/2016), Personal history of other diseases of the respiratory system (11/30/2016), Personal history of other diseases of the respiratory system (07/01/2015), Personal history of other diseases of the respiratory system (05/06/2015), Personal history of other diseases of the respiratory system (08/31/2016), Personal history of other infectious and parasitic diseases (08/31/2016), Personal history of other medical treatment (01/19/2016), Personal history of other specified conditions, Right upper quadrant pain (06/19/2017), and Superficial mycosis, unspecified (06/23/2017).    Surgical History  She has no past surgical history on file.     Social History  She reports that she has never smoked. She has never been exposed to tobacco smoke. She has never used smokeless tobacco. She reports that she does not drink alcohol and does not use drugs.    Family History  Family History   Problem Relation Name Age of Onset    Diabetes Mother      Stroke Mother      Heart attack Father          Allergies  Amoxicillin and Penicillins    Review of Systems     Physical Exam  Constitutional: Comfortable, NAD  HEENT: hearing and vision grossly intact, MMM  Resp: breathing comfortably   CV: extremities warm, well perfused  GI: abd soft  Neuro: AAOx3, sensory and motor grossly intact  Psych: Appropriate mood and affect ,     LLE:    -Skin intact  -Tender at site of injury with painful ROM.  -Motor intact in DF/PF/EHL/FHL  -SILT in saph/sural/SPN/DPN distributions  -Foot warm, well perfused  -DP/PT pulse, brisk cap refill  -Compartments soft and compressible     Last Recorded Vitals  Blood pressure 140/64, pulse 98, temperature 36.7 °C (98.1 °F), temperature source Oral, resp. rate 16, height 1.676 m (5' 5.98\"), weight 85.3 kg (188 lb), SpO2 95 " %.    Relevant Results  Results for orders placed or performed during the hospital encounter of 11/15/23 (from the past 24 hour(s))   CBC and Auto Differential   Result Value Ref Range    WBC 10.3 4.4 - 11.3 x10*3/uL    nRBC 0.0 0.0 - 0.0 /100 WBCs    RBC 4.10 4.00 - 5.20 x10*6/uL    Hemoglobin 11.2 (L) 12.0 - 16.0 g/dL    Hematocrit 34.7 (L) 36.0 - 46.0 %    MCV 85 80 - 100 fL    MCH 27.3 26.0 - 34.0 pg    MCHC 32.3 32.0 - 36.0 g/dL    RDW 14.8 (H) 11.5 - 14.5 %    Platelets 211 150 - 450 x10*3/uL    Neutrophils % 79.5 40.0 - 80.0 %    Immature Granulocytes %, Automated 0.3 0.0 - 0.9 %    Lymphocytes % 12.0 13.0 - 44.0 %    Monocytes % 7.7 2.0 - 10.0 %    Eosinophils % 0.1 0.0 - 6.0 %    Basophils % 0.4 0.0 - 2.0 %    Neutrophils Absolute 8.16 (H) 1.60 - 5.50 x10*3/uL    Immature Granulocytes Absolute, Automated 0.03 0.00 - 0.50 x10*3/uL    Lymphocytes Absolute 1.23 0.80 - 3.00 x10*3/uL    Monocytes Absolute 0.79 0.05 - 0.80 x10*3/uL    Eosinophils Absolute 0.01 0.00 - 0.40 x10*3/uL    Basophils Absolute 0.04 0.00 - 0.10 x10*3/uL   Comprehensive metabolic panel   Result Value Ref Range    Glucose 121 (H) 74 - 99 mg/dL    Sodium 140 136 - 145 mmol/L    Potassium 3.9 3.5 - 5.3 mmol/L    Chloride 105 98 - 107 mmol/L    Bicarbonate 23 21 - 32 mmol/L    Anion Gap 16 10 - 20 mmol/L    Urea Nitrogen 8 6 - 23 mg/dL    Creatinine 0.43 (L) 0.50 - 1.05 mg/dL    eGFR >90 >60 mL/min/1.73m*2    Calcium 9.2 8.6 - 10.6 mg/dL    Albumin 3.8 3.4 - 5.0 g/dL    Alkaline Phosphatase 132 33 - 136 U/L    Total Protein 6.8 6.4 - 8.2 g/dL    AST 23 9 - 39 U/L    Bilirubin, Total 0.5 0.0 - 1.2 mg/dL    ALT 26 7 - 45 U/L   Magnesium   Result Value Ref Range    Magnesium 1.79 1.60 - 2.40 mg/dL   Type and Screen   Result Value Ref Range    ABO TYPE A     Rh TYPE POS     ANTIBODY SCREEN NEG        MR femur left w and wo IV contrast    Result Date: 11/15/2023  Interpreted By:  Finkelstein, Evan, STUDY: MRI of the  right    without contrast dated   11/15/2023.   INDICATION: Tumor workup   COMPARISON: Left femur CT 11/15/2023. Left femur radiograph 11/15/2023   ACCESSION NUMBER(S): [id number]   ORDERING CLINICIAN: ELÍAS SANDS   TECHNIQUE: Multiplanar multisequence MR imaging of the left femur without and with 17 mL Dotarem IV contrast   FINDINGS: 5.1 x 4.9 x 5.3 cm (maximum AP by transverse by craniocaudal dimension) marrow replacing lesion in the distal femur centered in the lateral femoral condyle with extension from the metaphysis to the articular surface. The lesion demonstrates hypointense signal on T1 imaging and heterogeneous, but mostly increased intensity on T2 imaging. There is associated enhancement most pronounced along the periphery of the lesion with central hypointensity. There is associated pathologic fracture, which extends through the articular surface of the left femoral condyle into the more proximal metadiaphysis. There is adjacent edema like signal within the lateral knee soft tissues without definite masslike enhancement. No additional lesions are seen throughout the visualized osseous structures.   Femoroacetabular joint space narrowing with moderate chondral loss. No left hip joint effusion. Small suprapatellar joint effusion.       5.1 x 4.9 x 5.3 cm lesion in the distal femur centered in the lateral femoral condyle which extends from the metaphysis to the articular surface and demonstrates predominantly low T1 signal, high T2 signal and peripheral enhancement. Differential includes both benign and malignant processes. A large intraosseous ganglion is possible given the involvement of the articular surface and associated mild degenerative changes. Other malignant etiologies or metastatic disease is also not excluded, with no additional lesions seen throughout the visualized osseous structures. Recommend further evaluation with histopathologic diagnosis.   Pathologic fracture involving the above-mentioned lesion which extends  through the articular surface of the left femoral condyle as well as the more proximal metadiaphysis.   MACRO: None   Signed by: Evan Finkelstein 11/15/2023 11:50 PM Dictation workstation:   [id number]    CT chest abdomen pelvis w IV contrast    Result Date: 11/15/2023  Interpreted By:  Sage Cardenas, STUDY: CT CHEST ABDOMEN PELVIS W IV CONTRAST;  11/15/2023 7:55 pm   INDICATION: Signs/Symptoms:tumor work up.   COMPARISON: CT scan of 07/11/2017.   ACCESSION NUMBER(S): [id number]   ORDERING CLINICIAN: ELÍAS SANDS   TECHNIQUE: Axial CT images of the chest, abdomen and pelvis with coronal and sagittal reconstructed images obtained after intravenous administration of 75 mL of Omnipaque 350.   FINDINGS: CHEST:   VESSELS: Aorta is normal caliber. Atherosclerotic changes in the aorta and coronary arteries. HEART: Normal size. No pericardial effusion. MEDIASTINUM AND SERGEY: No pathologically enlarged thoracic lymph nodes. LUNG, PLEURA, LARGE AIRWAYS: Bibasilar airspace opacities may relate to atelectasis. Superimposed infection not excluded. No effusion or pneumothorax. CHEST WALL AND LOWER NECK: Heterogeneous thyroid gland with subcentimeter hypodense left thyroid nodule which is nonspecific. Small hiatal hernia. BONES: Multilevel degenerative changes of the spine.   ABDOMEN:   LIVER: Hepatomegaly. Normal morphology. Hepatic and portal veins are patent. Stable calcified focus adjacent to the right hepatic margin BILE DUCTS: Normal caliber. GALLBLADDER: No calcified gallstones. No wall thickening. PANCREAS: Mild fatty atrophy of the pancreas. SPLEEN: Within normal limits. Accessory splenules noted. ADRENALS: Within normal limits. KIDNEYS: Symmetric renal enhancement. No hydronephrosis or perinephric fluid collection. There is a punctate 2 mm nonobstructing calculus in the upper pole of the right kidney. URETERS: No hydroureter.   VESSELS: Calcific atherosclerosis of the aortoiliac vessels. No aortic aneurysm.  RETROPERITONEUM: Within normal limits.   PELVIS:   REPRODUCTIVE ORGANS: Uterus is present. No adnexal mass. BLADDER: Bladder is under distended with apparent wall thickening.   BOWEL: Stomach is under distended. Fatty infiltration within the gastric wall likely sequela of remote gastritis. Visualized loops of bowel are without evidence for obstruction. Scattered colonic diverticula without evidence for acute diverticulitis. Normal appendix. PERITONEUM: No ascites or free air, no fluid collection.   ABDOMINAL WALL: Small umbilical hernia contains fat. Injection granulomas noted in the right gluteal fat. BONES: No acute osseous abnormality.       No adenopathy in the chest, abdomen or pelvis.   Bibasilar airspace opacities may relate to atelectasis. Superimposed infection not excluded. Correlate clinically.   There is a punctate 2 mm nonobstructing calculus in the upper pole of the right kidney.   Scattered colonic diverticula without evidence for acute diverticulitis.   Additional findings as described above.   MACRO: None   Signed by: Sage Cardenas 11/15/2023 8:39 PM Dictation workstation:   [id number]    CT femur left wo IV contrast    Result Date: 11/15/2023  Interpreted By:  Nick Xiong, STUDY: CT FEMUR LEFT WO IV CONTRAST;  11/15/2023 4:55 pm   INDICATION: Signs/Symptoms:distal femur fracture.   COMPARISON: Radiographs dated 11/15/2023   ACCESSION NUMBER(S): [id number]   ORDERING CLINICIAN: AMEE ALMARAZ   TECHNIQUE: CT imaging of the  left femur was obtained  without the administration of intravenous contrast medium. Coronal and sagittal reformatted images were performed.   FINDINGS: OSSEOUS STRUCTURES: There is in acute fracture involving the distal femoral metaphysis at its lateral aspect through a large lytic lesion of the distal femoral metaphysis, measuring approximately 4.1 x 4.9 x 5.4 cm. The mass demonstrates full-thickness posterior cortical breakthrough. The fracture line extends to the lateral  femoral trochlea and anterolateral/posterolateral cortices of the distal femoral metadiaphysis. The posterior fracture line extends to the level of the distal femoral diaphysis. No additional lytic or blastic lesions are evident. No additional fractures. There is moderate osteoarthrosis of the left hip. There are mild degenerative changes of the left knee.   SOFT TISSUES: Partially visualized colonic diverticulosis without evidence of diverticulitis. Small left knee joint effusion.       Pathologic fracture of the distal femoral metaphysis through a lytic lesion with aggressive characteristics. Orthopedic oncology consultation and histopathologic sampling are recommended.   MACRO: None   Signed by: Nick Xiong 11/15/2023 5:17 PM Dictation workstation:   [id number]    XR femur left 2+ views    Result Date: 11/15/2023  Interpreted By:  Dl Gordillo, STUDY: XR HIP LEFT 2 OR 3 VIEWS; XR FEMUR LEFT 2+ VIEWS; XR KNEE LEFT 1-2 VIEWS; ;  11/15/2023 1:01 pm   INDICATION: Signs/Symptoms:slip and fall, pain over left greater trochanter; Signs/Symptoms:cortisone injection in knee 1 week ago, twisted knee, now having femur and knee pain. Unable to bear weight.   COMPARISON: None.   ACCESSION NUMBER(S): [id number]; [id number]; [id number]   ORDERING CLINICIAN: MANDIE LORD   FINDINGS: Frontal view of the pelvis and frontal and frogleg views of the left hip demonstrate an ill-defined calcifications/ossification inferior to the left hip joint with no donor site noted. No other fracture or dislocation is noted. The hip joint spaces are normal. The pelvis has a few benign phleboliths.   Frontal and lateral views of the left femur and frontal and lateral views of the left knee were obtained.   The distal left femur/lateral condyle has developed a spiral comminuted fracture extending from the medial distal shaft/metaphysis laterally through the cortex above the lateral femoral condyle and medially through the condyle into the  articular surface near the intercondylar notch. There is minimal lateral offset of the lateral condylar fragment with trace suprapatellar effusion.   No other fracture or dislocation is noted.         1. The distal femoral metaphysis has comminuted fracture extending from the medial metaphysis inferiorly to the intracondylar notch with an additional fracture line extending laterally superior to the lateral femoral condyle. The condylar fragment is minimally displaced laterally.   2. An ill-defined calcifications/ossification inferior to the left hip joint is of uncertain etiology; no donor site is noted. While this may represents prior injury with soft tissue calcification, acute fracture cannot be excluded.     MACRO: None   Signed by: Dl Gordillo 11/15/2023 1:18 PM Dictation workstation:   [id number]    XR hip left 2 or 3 views    Result Date: 11/15/2023  Interpreted By:  Dl Gordillo, STUDY: XR HIP LEFT 2 OR 3 VIEWS; XR FEMUR LEFT 2+ VIEWS; XR KNEE LEFT 1-2 VIEWS; ;  11/15/2023 1:01 pm   INDICATION: Signs/Symptoms:slip and fall, pain over left greater trochanter; Signs/Symptoms:cortisone injection in knee 1 week ago, twisted knee, now having femur and knee pain. Unable to bear weight.   COMPARISON: None.   ACCESSION NUMBER(S): [id number]; [id number]; [id number]   ORDERING CLINICIAN: MANDIE LORD   FINDINGS: Frontal view of the pelvis and frontal and frogleg views of the left hip demonstrate an ill-defined calcifications/ossification inferior to the left hip joint with no donor site noted. No other fracture or dislocation is noted. The hip joint spaces are normal. The pelvis has a few benign phleboliths.   Frontal and lateral views of the left femur and frontal and lateral views of the left knee were obtained.   The distal left femur/lateral condyle has developed a spiral comminuted fracture extending from the medial distal shaft/metaphysis laterally through the cortex above the lateral femoral condyle and  medially through the condyle into the articular surface near the intercondylar notch. There is minimal lateral offset of the lateral condylar fragment with trace suprapatellar effusion.   No other fracture or dislocation is noted.         1. The distal femoral metaphysis has comminuted fracture extending from the medial metaphysis inferiorly to the intracondylar notch with an additional fracture line extending laterally superior to the lateral femoral condyle. The condylar fragment is minimally displaced laterally.   2. An ill-defined calcifications/ossification inferior to the left hip joint is of uncertain etiology; no donor site is noted. While this may represents prior injury with soft tissue calcification, acute fracture cannot be excluded.     MACRO: None   Signed by: Dl Gordillo 11/15/2023 1:18 PM Dictation workstation:   [id number]    XR knee left 1-2 views    Result Date: 11/15/2023  Interpreted By:  Dl Gordillo, STUDY: XR HIP LEFT 2 OR 3 VIEWS; XR FEMUR LEFT 2+ VIEWS; XR KNEE LEFT 1-2 VIEWS; ;  11/15/2023 1:01 pm   INDICATION: Signs/Symptoms:slip and fall, pain over left greater trochanter; Signs/Symptoms:cortisone injection in knee 1 week ago, twisted knee, now having femur and knee pain. Unable to bear weight.   COMPARISON: None.   ACCESSION NUMBER(S): [id number]; [id number]; [id number]   ORDERING CLINICIAN: MANDIE LORD   FINDINGS: Frontal view of the pelvis and frontal and frogleg views of the left hip demonstrate an ill-defined calcifications/ossification inferior to the left hip joint with no donor site noted. No other fracture or dislocation is noted. The hip joint spaces are normal. The pelvis has a few benign phleboliths.   Frontal and lateral views of the left femur and frontal and lateral views of the left knee were obtained.   The distal left femur/lateral condyle has developed a spiral comminuted fracture extending from the medial distal shaft/metaphysis laterally through the cortex  above the lateral femoral condyle and medially through the condyle into the articular surface near the intercondylar notch. There is minimal lateral offset of the lateral condylar fragment with trace suprapatellar effusion.   No other fracture or dislocation is noted.         1. The distal femoral metaphysis has comminuted fracture extending from the medial metaphysis inferiorly to the intracondylar notch with an additional fracture line extending laterally superior to the lateral femoral condyle. The condylar fragment is minimally displaced laterally.   2. An ill-defined calcifications/ossification inferior to the left hip joint is of uncertain etiology; no donor site is noted. While this may represents prior injury with soft tissue calcification, acute fracture cannot be excluded.     MACRO: None   Signed by: Dl Gordillo 11/15/2023 1:18 PM Dictation workstation:   [id number]       Assessment/Plan   Principal Problem:    Closed fracture of femur, unspecified fracture morphology, unspecified laterality, unspecified portion of femur, initial encounter (CMS/[facility])    Injury: Pathologic L distal femur fx   HPI: 73F (HLD, psoriasis, latent TB) p/a fall. Antecedent knee pain x 2 months treated with CSI by sports med. Denies systemic symptoms. Ambulates w walker at baseline. Closed, NVI. XR/CT/MRI w lytic lesion in distal femoral metaphysis w intra articular fx. Tumor of unknown origin work up initiated. CT C/A/P w/o obvious primary lesion. CBC, BMP, LFT, Ca/Phos/ALP, coags, UA complete. ESR 61, CRP 5.1, . SPEP/UPEP pending. Placed in .     Medicine/oncology available as consultation service.     Consented for ORIF L distal femur w intra-op biopsy w Dr. Gardner pending OR timing.    .Plan:   - Admit to orthopaedic surgery  - Consented and added to OR schedule for ORIF L distal femur w biopsy with orthopedic surgery on 11/16  - NPO at midnight for upcoming procedure.  - Preop labs/orders are complete: EKG, CXR,  CBC, BMP, Coags, T+S  - Strict Bedrest, NWB LLE in KI  - Pre-operative ABx: None indicated    - No indication for pre-operative transfusion, 2U pRBC on hold for OR  - Tylenol, oxycodone, dilaudid for pain control  - LR @ 100 cc/hr when NPO.   - SCDs, will hold AM dose of DVT ppx the morning of surgery  - Maintain all tubes/lines/drains    Consult seen and staffed within 30 minutes of notification.    Plan discussed with attending, Dr. Gardner.  Lupe Thorne, PGY-2  Orthopaedic Surgery   Pager: [phone]  [employer] Preferred    Patient will be followed by the Orthopaedic Oncology Team:  Sumanth Vinson  Available via [employer]     I had a long discussion with the patient, her daughter as well as her son.  We discussed the right now imaging including MRI and CT scan show a small pathologic fracture through a lesion of the left distal femur.  I see no other lesions on the MRI of the left femur.  She is given a bone scan which shows no evidence of disease elsewhere.  CT chest, abdomen and pelvis shows no evidence of a primary site.  We discussed that at her age the most likely diagnosis would be metastatic disease, myeloma or lymphoma and then this type of area certainly a solitary plasmacytoma comes to mind.  Even though she is a bit old I also think about potential giant cell tumor.  Lastly this could certainly be a sarcoma of bone.  I told him that given the pathologic fracture I think the next he would do would be to take her to the operating room for fixation of this fracture.  Prior to doing this we perform an open biopsy and pending the results of that biopsy on frozen section we would then either proceed with curettage with augmentation followed by fixation with a plate versus closing up +/- a palliative plate if there is concern for sarcoma on the frozen section.  We discussed risk and benefits of this approach extensively and ultimately they are in agreement and agree.  We discussed risk and benefits including risk of  infection, blood loss, DVT and pulmonary embolism, failure of surgery need for future revisions, damage to local structures, complications result of anesthesia, local recurrence/progression, change in diagnosis of final pathology, as well as chronic pain.  I also discussed with him that no matter what the final pathology given that I do believe this is an aggressive lesion her risk of local recurrence is going to be higher because of the fracture itself.  They understand all this and are willing to proceed.    Wilmer Gardner MD

## 2023-11-16 NOTE — CARE PLAN
The patient's goals for the shift include Patient will have managed pain during shift    The clinical goals for the shift include patient will be able to ambulate at baseline    Over the shift, the patient did not make progress toward the following goals. Barriers to progression include . Recommendations to address these barriers include .

## 2023-11-16 NOTE — CARE PLAN
The patient's goals for the shift include Patient will have managed pain during shift    The clinical goals for the shift include patient will be able to ambulate at baseline

## 2023-11-16 NOTE — ED TRIAGE NOTES
PT presents to ED via EMS as a transfer from [facility] for chief complaint of femur fracture. Per EMS PT was found to have a left distal femur fracture, non-traumatic. Pts primary language is Panamanian, Pts daughter has been translating via phone. PT is [employer], usually ambulates on her own.

## 2023-11-16 NOTE — ANESTHESIA PREPROCEDURE EVALUATION
Patient: Blanquita Morales    Procedure Information       Date/Time: 11/16/23 1600    Procedures:       Open Reduction Internal Fixation Distal Femur (Left: Thigh - Leg Upper)      Biopsy Bone Lower Extremity (Left: Thigh - Leg Upper)    Location: [facility] OR  / [facility] OR    Surgeons: Wilmer Gardner MD            Relevant Problems   Anesthesia   No history of complications History of general anesthesia      Cardiovascular   (+) Cardiac arrhythmia   (+) Hyperlipidemia   (+) Hypertension      Endocrine   (+) Class 1 obesity with body mass index (BMI) of 34.0 to 34.9 in adult      GI   (+) GERD (gastroesophageal reflux disease)      /Renal   (+) Fatty liver disease, nonalcoholic   (+) Hepatic lesion      Neuro/Psych   (+) Anticipatory anxiety   (+) Anxiety and depression   (+) Impingement of vertebral body syndrome   (+) Lumbar radiculopathy      GI/Hepatic   (+) Fatty liver disease, nonalcoholic      Hematology   (+) Anemia      Musculoskeletal   (+) Osteoarthritis of left knee      Eyes, Ears, Nose, and Throat   (+) Asymmetrical sensorineural hearing loss   (+) Hearing loss      Infectious Disease   (+) Impetigo       Clinical information reviewed:   Tobacco  Allergies  Meds   Med Hx  Surg Hx   Fam Hx  Soc Hx        NPO Detail:  NPO/Void Status  Carbonhydrate Drink Given Prior to Surgery? : N  Date of Last Liquid: 11/15/23  Time of Last Liquid: 2359  Date of Last Solid: 11/15/23  Time of Last Solid: 2359  Last Intake Type: Clear fluids  Time of Last Void: 1500         Physical Exam    Airway  Mallampati: III  TM distance: >3 FB  Neck ROM: full     Cardiovascular   Rhythm: regular  Rate: normal     Dental    Pulmonary   Breath sounds clear to auscultation     Abdominal            Anesthesia Plan    ASA 3     general     The patient is not a current smoker.    intravenous induction   Postoperative administration of opioids is intended.  Trial extubation is planned.  Anesthetic plan and risks  discussed with patient.  Use of blood products discussed with patient who.    Plan discussed with attending.

## 2023-11-16 NOTE — NURSING NOTE
Patient has x-rays ordered, this nurse went to tell, patient the next steps with son in the room. Five minutes later son provided phone to this nurse from daughter Roro, explained that x-rays were ordered for the arms. Daughter states that patient will not be receiving any further imaging until she speaks to the orthopedic department. Patient has oxycodone and hydromorphone ordered for pain, family and patient refused ordered pain medication states tylenol makes her sleepy. Attempted to educate the purpose of pain medication with fracture pending surgery, unable to redirect voiced an understanding and continued to refuse. Page sent to Ortho with return call, notified of the request from family.   4 = No assist / stand by assistance

## 2023-11-16 NOTE — ED PROVIDER NOTES
HPI   No chief complaint on file.      Patient is a 73-year-old female past medical history of osteoarthritis, anxiety, GERD, hypertension, hyperlipidemia, psoriasis here as a transfer from [facility] in the setting of a femur fracture sustained from a mechanical fall.  Baseline ambulation with a walker from osteoarthritis, recently had a cortisone injection with orthopedic surgery last week.  Denies any prodrome to symptoms, LOC, head trauma.      History provided by:  EMS personnel  History limited by:  Age   used: No                        McIntosh Coma Scale Score: 15                  Patient History   Past Medical History:   Diagnosis Date    Abnormal levels of other serum enzymes 08/31/2016    Abnormal liver enzymes    Acute maxillary sinusitis, unspecified 11/30/2016    Acute non-recurrent maxillary sinusitis    Acute pharyngitis, unspecified 05/06/2015    Sore throat    Cardiomegaly 08/31/2016    Enlarged LA (left atrium)    Encounter for gynecological examination (general) (routine) without abnormal findings 08/31/2016    Encounter for routine gynecological examination    Infectious gastroenteritis and colitis, unspecified 06/23/2017    Diarrhea of infectious origin    Medial epicondylitis, left elbow 01/23/2017    Epicondylitis elbow, medial, left    Nausea 09/02/2014    Nausea    Otalgia, bilateral 05/06/2015    Otalgia of both ears    Other abnormal and inconclusive findings on diagnostic imaging of breast 08/31/2016    Abnormal mammogram    Other conditions influencing health status     Normal endoscopy    Palpitations 08/31/2016    Heart palpitations    Paroxysmal atrial fibrillation (CMS/HCC) 05/29/2020    Paroxysmal atrial fibrillation    Personal history of other diseases of the circulatory system 08/31/2016    History of angina pectoris    Personal history of other diseases of the circulatory system 08/31/2016    History of atrial fibrillation    Personal history of other diseases of  the digestive system     History of gastrointestinal hemorrhage    Personal history of other diseases of the digestive system 03/30/2015    History of rectal bleeding    Personal history of other diseases of the nervous system and sense organs 11/30/2016    History of serous otitis media    Personal history of other diseases of the respiratory system 11/30/2016    History of acute bronchitis    Personal history of other diseases of the respiratory system 07/01/2015    History of sore throat    Personal history of other diseases of the respiratory system 05/06/2015    History of streptococcal pharyngitis    Personal history of other diseases of the respiratory system 08/31/2016    History of acute pharyngitis    Personal history of other infectious and parasitic diseases 08/31/2016    History of candidiasis of mouth    Personal history of other medical treatment 01/19/2016    History of screening mammography    Personal history of other specified conditions     History of dizziness    Right upper quadrant pain 06/19/2017    Abdominal pain, right upper quadrant    Superficial mycosis, unspecified 06/23/2017    Fungal dermatitis     No past surgical history on file.  Family History   Problem Relation Name Age of Onset    Diabetes Mother      Stroke Mother      Heart attack Father       Social History     Tobacco Use    Smoking status: Never     Passive exposure: Never    Smokeless tobacco: Never   Substance Use Topics    Alcohol use: Never    Drug use: Never       Physical Exam   ED Triage Vitals   Temp Pulse Resp BP   -- -- -- --      SpO2 Temp src Heart Rate Source Patient Position   -- -- -- --      BP Location FiO2 (%)     -- --       Physical Exam  Constitutional:       Appearance: Normal appearance.   HENT:      Head: Normocephalic and atraumatic.      Right Ear: External ear normal.      Left Ear: External ear normal.      Nose: Nose normal.      Mouth/Throat:      Mouth: Mucous membranes are moist.       Pharynx: Oropharynx is clear.   Eyes:      Extraocular Movements: Extraocular movements intact.      Conjunctiva/sclera: Conjunctivae normal.      Pupils: Pupils are equal, round, and reactive to light.   Cardiovascular:      Rate and Rhythm: Normal rate and regular rhythm.      Pulses: Normal pulses.      Heart sounds: Normal heart sounds.   Pulmonary:      Effort: Pulmonary effort is normal.      Breath sounds: Normal breath sounds.   Chest:      Chest wall: No tenderness.   Abdominal:      Palpations: Abdomen is soft.      Tenderness: There is no abdominal tenderness.   Musculoskeletal:      Cervical back: Normal range of motion and neck supple. No rigidity or tenderness.      Comments: Tenderness over left femur, hip and ankle with neurovascular intact exam.  No drainage or overlying signs of infection.   Neurological:      General: No focal deficit present.      Mental Status: She is alert and oriented to person, place, and time.   Psychiatric:         Mood and Affect: Mood normal.         Behavior: Behavior normal.         ED Course & MDM   Diagnoses as of 11/16/23 0140   Closed fracture of femur, unspecified fracture morphology, unspecified laterality, unspecified portion of femur, initial encounter (CMS/[facility])       Medical Decision Making  Patient is a 73-year-old female here as a transfer from [facility] in the setting of a femur fracture with concern for underlying malignancy.  Patient presents mildly tachycardic otherwise hemodynamically stable, no acute distress, mentating at her baseline, afebrile and saturating well on room air.  Physical exam notable for exquisite tenderness over left lower extremity with neurovascular intact exam.  Patient had already received CT chest abdomen pelvis to assess for metastases of concern for malignancy based on CT and MRI findings at outside hospital, CT abdomen pelvis was negative.  Attempted to complete the pan scan with a C-spine and head given the concern for underlying  "malignancy, patient understands benefits of doing so and my recommendation to do so, yet she declines as \"I have just been through so much today\".  Patient admitted to orthopedic surgery with plan for operative intervention in the a.m.  Offered analgesia, patient declined at this time.    Amount and/or Complexity of Data Reviewed  External Data Reviewed: notes.  Labs: ordered.  Radiology: ordered.        Procedure  Procedures     Roosevelt Melgar MD  Resident  11/16/23 0140       Roosevelt Melgar MD  Resident  11/16/23 0141    "

## 2023-11-16 NOTE — ANESTHESIA PROCEDURE NOTES
Peripheral IV  Date/Time: 11/16/2023 5:20 PM      Placement  Needle size: 20 G  Laterality: left  Location: hand  Local anesthetic: none  Site prep: chlorhexidine  Technique: anatomical landmarks  Attempts: 1

## 2023-11-16 NOTE — ANESTHESIA PROCEDURE NOTES
Airway  Date/Time: 11/16/2023 5:13 PM  Urgency: elective    Airway not difficult    Staffing  Performed: resident   Authorized by: Rhiannon Haney MD    Performed by: Cecil Bonds MD  Patient location during procedure: OR    Indications and Patient Condition  Indications for airway management: anesthesia  Spontaneous ventilation: present  Sedation level: deep  Preoxygenated: yes  Patient position: sniffing  Mask difficulty assessment: 1 - vent by mask  Planned trial extubation    Final Airway Details  Final airway type: endotracheal airway      Successful airway: ETT  Cuffed: yes   Successful intubation technique: direct laryngoscopy  Facilitating devices/methods: intubating stylet  Blade: Matias  Blade size: #3  ETT size (mm): 7.0  Cormack-Lehane Classification: grade IIa - partial view of glottis  Placement verified by: chest auscultation and capnometry   Measured from: teeth  ETT to teeth (cm): 21  Number of attempts at approach: 1  Number of other approaches attempted: 0

## 2023-11-16 NOTE — PROGRESS NOTES
I met with Blanquita at the bedside regarding discharge planning and home going needs. I was asked to call Liz(daughter) [phone] all information received from patient's daughter. Patient lives at home with her daughter and she is usually independent with ADL's she has a walker in the home. Patient is not medically cleared for discharge at this time. I will continue to follow with a safe discharge plan.

## 2023-11-17 LAB
ALBUMIN MFR UR ELPH: 35.4 %
ALPHA1 GLOB MFR UR ELPH: 14.1 %
ALPHA2 GLOB MFR UR ELPH: 21.8 %
ANION GAP SERPL CALC-SCNC: 13 MMOL/L (ref 10–20)
B-GLOBULIN MFR UR ELPH: 19.6 %
BUN SERPL-MCNC: 11 MG/DL (ref 6–23)
CALCIUM SERPL-MCNC: 8.9 MG/DL (ref 8.6–10.6)
CHLORIDE SERPL-SCNC: 105 MMOL/L (ref 98–107)
CO2 SERPL-SCNC: 22 MMOL/L (ref 21–32)
CREAT SERPL-MCNC: 0.38 MG/DL (ref 0.5–1.05)
ERYTHROCYTE [DISTWIDTH] IN BLOOD BY AUTOMATED COUNT: 15.4 % (ref 11.5–14.5)
GAMMA GLOB MFR UR ELPH: 9.1 %
GFR SERPL CREATININE-BSD FRML MDRD: >90 ML/MIN/1.73M*2
GLUCOSE SERPL-MCNC: 122 MG/DL (ref 74–99)
HCT VFR BLD AUTO: 33.6 % (ref 36–46)
HGB BLD-MCNC: 10.1 G/DL (ref 12–16)
MCH RBC QN AUTO: 26.9 PG (ref 26–34)
MCHC RBC AUTO-ENTMCNC: 30.1 G/DL (ref 32–36)
MCV RBC AUTO: 89 FL (ref 80–100)
NRBC BLD-RTO: 0 /100 WBCS (ref 0–0)
PATH REVIEW-URINE PROTEIN ELECTROPHORESIS: NORMAL
PLATELET # BLD AUTO: 223 X10*3/UL (ref 150–450)
POTASSIUM SERPL-SCNC: 4.5 MMOL/L (ref 3.5–5.3)
RBC # BLD AUTO: 3.76 X10*6/UL (ref 4–5.2)
SODIUM SERPL-SCNC: 135 MMOL/L (ref 136–145)
URINE ELECTROPHORESIS COMMENT: NORMAL
WBC # BLD AUTO: 10.6 X10*3/UL (ref 4.4–11.3)

## 2023-11-17 PROCEDURE — 85027 COMPLETE CBC AUTOMATED: CPT | Performed by: STUDENT IN AN ORGANIZED HEALTH CARE EDUCATION/TRAINING PROGRAM

## 2023-11-17 PROCEDURE — 36415 COLL VENOUS BLD VENIPUNCTURE: CPT | Performed by: STUDENT IN AN ORGANIZED HEALTH CARE EDUCATION/TRAINING PROGRAM

## 2023-11-17 PROCEDURE — 82374 ASSAY BLOOD CARBON DIOXIDE: CPT | Performed by: STUDENT IN AN ORGANIZED HEALTH CARE EDUCATION/TRAINING PROGRAM

## 2023-11-17 PROCEDURE — 97161 PT EVAL LOW COMPLEX 20 MIN: CPT | Mod: GP | Performed by: STUDENT IN AN ORGANIZED HEALTH CARE EDUCATION/TRAINING PROGRAM

## 2023-11-17 PROCEDURE — 97165 OT EVAL LOW COMPLEX 30 MIN: CPT | Mod: GO

## 2023-11-17 PROCEDURE — 1100000001 HC PRIVATE ROOM DAILY

## 2023-11-17 PROCEDURE — 2500000001 HC RX 250 WO HCPCS SELF ADMINISTERED DRUGS (ALT 637 FOR MEDICARE OP): Performed by: STUDENT IN AN ORGANIZED HEALTH CARE EDUCATION/TRAINING PROGRAM

## 2023-11-17 PROCEDURE — 2500000001 HC RX 250 WO HCPCS SELF ADMINISTERED DRUGS (ALT 637 FOR MEDICARE OP)

## 2023-11-17 PROCEDURE — 2500000004 HC RX 250 GENERAL PHARMACY W/ HCPCS (ALT 636 FOR OP/ED): Performed by: STUDENT IN AN ORGANIZED HEALTH CARE EDUCATION/TRAINING PROGRAM

## 2023-11-17 PROCEDURE — 93010 ELECTROCARDIOGRAM REPORT: CPT | Performed by: INTERNAL MEDICINE

## 2023-11-17 PROCEDURE — 2500000004 HC RX 250 GENERAL PHARMACY W/ HCPCS (ALT 636 FOR OP/ED)

## 2023-11-17 RX ADMIN — Medication 5000 UNITS: at 09:10

## 2023-11-17 RX ADMIN — ACETAMINOPHEN 650 MG: 325 TABLET ORAL at 18:08

## 2023-11-17 RX ADMIN — ACETAMINOPHEN 650 MG: 325 TABLET ORAL at 05:00

## 2023-11-17 RX ADMIN — CEFAZOLIN SODIUM 2 G: 2 INJECTION, SOLUTION INTRAVENOUS at 09:16

## 2023-11-17 RX ADMIN — ASPIRIN 81 MG: 81 TABLET, COATED ORAL at 20:21

## 2023-11-17 RX ADMIN — Medication 1 TABLET: at 09:10

## 2023-11-17 RX ADMIN — CEFAZOLIN SODIUM 2 G: 2 INJECTION, SOLUTION INTRAVENOUS at 01:30

## 2023-11-17 RX ADMIN — ACETAMINOPHEN 650 MG: 325 TABLET ORAL at 12:12

## 2023-11-17 RX ADMIN — PANTOPRAZOLE SODIUM 40 MG: 40 TABLET, DELAYED RELEASE ORAL at 09:11

## 2023-11-17 RX ADMIN — Medication 1 TABLET: at 20:21

## 2023-11-17 RX ADMIN — ASPIRIN 81 MG: 81 TABLET, COATED ORAL at 09:11

## 2023-11-17 ASSESSMENT — PAIN - FUNCTIONAL ASSESSMENT
PAIN_FUNCTIONAL_ASSESSMENT: 0-10

## 2023-11-17 ASSESSMENT — PAIN DESCRIPTION - LOCATION
LOCATION: LEG

## 2023-11-17 ASSESSMENT — COGNITIVE AND FUNCTIONAL STATUS - GENERAL
DAILY ACTIVITIY SCORE: 15
DRESSING REGULAR LOWER BODY CLOTHING: A LOT
HELP NEEDED FOR BATHING: A LOT
TOILETING: A LOT
DRESSING REGULAR UPPER BODY CLOTHING: A LITTLE
PERSONAL GROOMING: A LITTLE
MOVING TO AND FROM BED TO CHAIR: A LITTLE
EATING MEALS: A LITTLE
WALKING IN HOSPITAL ROOM: A LOT
TURNING FROM BACK TO SIDE WHILE IN FLAT BAD: A LITTLE
MOBILITY SCORE: 15
CLIMB 3 TO 5 STEPS WITH RAILING: TOTAL
STANDING UP FROM CHAIR USING ARMS: A LITTLE
MOVING FROM LYING ON BACK TO SITTING ON SIDE OF FLAT BED WITH BEDRAILS: A LITTLE

## 2023-11-17 ASSESSMENT — PAIN SCALES - GENERAL
PAINLEVEL_OUTOF10: 5 - MODERATE PAIN
PAINLEVEL_OUTOF10: 2

## 2023-11-17 ASSESSMENT — ACTIVITIES OF DAILY LIVING (ADL)
ADL_ASSISTANCE: INDEPENDENT
ADL_ASSISTANCE: INDEPENDENT

## 2023-11-17 ASSESSMENT — PAIN DESCRIPTION - ORIENTATION
ORIENTATION: LEFT

## 2023-11-17 NOTE — PROGRESS NOTES
"Orthopaedic Surgery Progress Note    S:    Evaluated Post-operatively. Pain controlled on current regimen.  Denies any new onset numbness, tingling or weakness. Denies nausea, vomiting, chest pain, dyspnea, or calf tenderness. Denies any fever or chills.     O:  /64   Pulse 90   Temp 36.7 °C (98.1 °F) (Temporal)   Resp 18   Ht 1.676 m (5' 5.98\")   Wt 85.3 kg (188 lb)   SpO2 99%   BMI 30.36 kg/m²     GEN - NAD, resting comfortably in hospital bed  HEENT - MMM, EOMI, NCAT   CV - RRR by peripheral palpation, limbs wwp  PULM - NWOB on RA  ABD - Non-distended  NEURO - PUENTE spontaneously, CNs II - XII grossly intact  PSYCH - Appropriate mood and affect    MSK:  LLE:   -Post-operative dressing/splint in place without strikethrough bleeding.   -Motor intact in DF/PF/EHL/FHL, SILT in saph/sural/SPN/DPN/tibial distributions  -Foot wwp, brisk cap refill, 2+ DP/PT pulse  -Compartments soft and compressible, no pain with passive dorsiflexion      A/P: 73 y.o. female PMH HLD, GERD, psoriasis, latent TB s/p open biopsy, Curettage, cement augmentation, and ORIF L distal femur pathologic Fx positive for GCT on frozen section on 11/16 with Dr. Gardner.    - Clear liquid diet, okay to advance as tolerated. Bowel Regimen: Colace, senna, dulcolax.  - Multimodal pain therapy: scheduled tylenol, prn oxycodone, dilaudid prn for breakthrough  - mIVF to continue until patient is tolerating good PO intake, HLIV w/ good PO; Will monitor BMP on POD 1 and prn thereafter  - Weightbearing: NWB RLE, ROMAT and encouraged. PT/OT consult, to see by POD1 at the latest.   - Encourage IS   - Perioperative ancef q8 for 24 hours  - No indication for transfusion; monitor CBC POD1, repeat prn thereafter.  - DVT PPx: SCD, ASA 81 mg BID for chemoPPx   - Maintain PIV   - Drain: None  - Continue home meds: PPI  - Glycemic: No issues   - pending final pathology sent from OR, frozen sections c/w GCT    Dispo: Pending PT    This plan was discussed with " the attending, Dr. Gardner.    Orthopedic Tumor Team:     Sumanth Vinson, PGY-4 ([id number]), Epic Chat Preferred    Please call on call resident ([phone]) nights after 6pm and weekends

## 2023-11-17 NOTE — PROGRESS NOTES
Physical Therapy    Physical Therapy Evaluation    Patient Name: Blanquita Morales  MRN: [id number]  Today's Date: 11/17/2023   Time Calculation  Start Time: 0935  Stop Time: 1000  Time Calculation (min): 25 min    Assessment/Plan   PT Assessment  PT Assessment Results: Decreased strength, Decreased range of motion, Decreased endurance, Impaired balance, Decreased mobility, Pain  Rehab Prognosis: Excellent  Barriers to Discharge: Pain tolerance for household distance and stairs  End of Session Communication: Bedside nurse  End of Session Patient Position: Up in chair, Alarm on  IP OR SWING BED PT PLAN  Inpatient or Swing Bed: Inpatient  PT Plan  Treatment/Interventions: Bed mobility, Gait training, Stair training, Transfer training, Balance training, Strengthening, Endurance training, Range of motion, Therapeutic exercise, Therapeutic activity  PT Plan: Skilled PT  PT Frequency: 5 times per week  PT Discharge Recommendations: Low intensity level of continued care  Equipment Recommended upon Discharge: Wheeled walker  PT Recommended Transfer Status: Assist x1, Assistive device  PT - OK to Discharge: Yes    Subjective     General Visit Information:  Subjective: Patient is alert, agreeable to PT.  Daughter in room and supportive throughout session.  Patient expressing understanding and agreeability to receive further acute PT prior to discharge.    Reason for Referral: Fall, distal femur fx, s/p open biopsy, Curettage, cement augmentation, and ORIF L distal femur pathologic  Past Medical History Relevant to Rehab: PMH HLD, GERD, psoriasis, latent TB  Prior to Session Communication: Bedside nurse  Patient Position Received: Up in chair, Alarm on  Family/Caregiver Present: Yes (daughter)   Home Living:  Home Living  Type of Home: House  Lives With:  (adult daughter)  Home Adaptive Equipment: Cane, Walker rolling or standard (rollator)  Home Layout: One level  Home Access: Stairs to enter with rails  Entrance Stairs-Rails:  Both  Entrance Stairs-Number of Steps: 3  Prior Level of Function:  Prior Function Per Pt/Caregiver Report  Level of [place]: Independent with ADLs and functional transfers (recent use of cane past couple months 2/2 knee pain)  ADL Assistance: Independent  Precautions:  Precautions  LE Weight Bearing Status: Left Non-Weight Bearing  Medical Precautions: Fall precautions    Medical Gas Therapy: None (Room air)      Continuous Medications/Drips:  lactated Ringer's, 100 mL/hr, Last Rate: 100 mL/hr (11/16/23 2230)  oxygen, 2 L/min        Objective   Pain:  Pain Assessment  Pain Score: 5 - Moderate pain (post 7)  Pain Type: Surgical pain  Pain Location: Knee  Pain Orientation: Left    Cognition:  Cognition  Overall Cognitive Status: Within Functional Limits  Orientation Level: Oriented X4    General Assessments:  Extremity/Trunk Assessments:  Tone: No abnormalities noted  Sensation  Light Touch: No apparent deficits  Coordination  Movements are Fluid and Coordinated: Yes  Upper Extremity  ROM: WFL  Strength:WFL  Lower Extremity  ROM: Impaired: L: knee flex 0-30 AROM limited by pain and swelling  Strength: Not WFL RLE: Hip flexion 5/5, Knee flexion 5/5, Knee extension 5/5, PF 5/5, DF 5/5  LLE: Hip flexion 2-/5, Knee flexion 2/5, Knee extension 2/5, PF 3/5, DF 3/5   Sitting Static Balance Normal  Sitting Dynamic Balance Not NormalUE Support Two UE support and Assist Level Supervision  Standing Static Balance Not NormalUE Support Two UE support and Assist Level Contact Guard  Standing Dynamic Balance Not NormalUE Support Two UE support and Assist Level Contact Guard    Functional Assessments:       Transfers  Transfer: Yes  Transfer 1  Transfer From 1: Sit to  Transfer to 1: Stand  Transfer Device 1: Walker  Transfer Level of Assistance 1: Contact guard  Transfers 2  Transfer From 2: Stand to  Transfer to 2: Sit  Transfer Device 2: Walker  Transfer Level of Assistance 2: Contact guard  Transfers 3  Transfer From 3:  Chair with arms to  Transfer to 3: Chair with arms  Transfer Device 3: Walker  Transfer Level of Assistance 3: Contact guard    Ambulation/Gait Training  Ambulation/Gait Training Performed: Yes  Ambulation/Gait Training 1  Surface 1: Level tile  Device 1: Bariatric rolling walker  Assistance 1: Contact guard  Quality of Gait 1:  (single leg hop able to maintain NWB status c verbal and visual cues)  Comments/Distance (ft) 1: 8'    Outcome Measures:  [facility] Basic Mobility  Turning from your back to your side while in a flat bed without using bedrails: A little  Moving from lying on your back to sitting on the side of a flat bed without using bedrails: A little  Moving to and from bed to chair (including a wheelchair): A little  Standing up from a chair using your arms (e.g. wheelchair or bedside chair): A little  To walk in hospital room: A lot  Climbing 3-5 steps with railing: Total  Basic Mobility - Total Score: 15        Encounter Problems       Encounter Problems (Active)       PT Problem       Patient will complete supine to sit and sit to supine Supervision  (Progressing)       Start:  11/17/23    Expected End:  12/01/23            Patient will perform sit<>stand transfer with Rolling Walker, and Supervision  (Progressing)       Start:  11/17/23    Expected End:  12/01/23            Patient will ambulate >30' with Rolling Walker and Supervision and NWB LLE  (Progressing)       Start:  11/17/23    Expected End:  12/01/23            Patient will ascend/descend 3 steps with Bilateral Rails and CGA  (Progressing)       Start:  11/17/23    Expected End:  12/01/23                 Assessment: Patient presents s/p ORIF L femur.  Currently limited by pain, ROM, strength, and mobility deficits, and a high falls risk. Patient would benefit from further therapy to increase functional independence and safety.  Will continue to follow during acute stay.      Education Documentation  Precautions, taught by Miguelito Mcnamara, PT  at 11/17/2023 10:50 AM.  Learner: Patient  Readiness: Acceptance  Method: Explanation  Response: Needs Reinforcement    Body Mechanics, taught by Miguelito Mcnamara PT at 11/17/2023 10:50 AM.  Learner: Patient  Readiness: Acceptance  Method: Explanation  Response: Needs Reinforcement    Mobility Training, taught by Miguelito Mcnamara PT at 11/17/2023 10:50 AM.  Learner: Patient  Readiness: Acceptance  Method: Explanation  Response: Needs Reinforcement    Education Comments  No comments found.          11/17/23 at 10:50 AM   Miguelito Mcnamara PT   Rehab Office: [phone]

## 2023-11-17 NOTE — PROGRESS NOTES
"Orthopaedic Surgery Progress Note    S:    No acute events. Pain controlled on current regimen.  Denies any new onset numbness, tingling or weakness. Denies nausea, vomiting, chest pain, dyspnea, or calf tenderness. Denies any fever or chills.     O:  /72   Pulse 85   Temp 36.1 °C (97 °F)   Resp 16   Ht 1.676 m (5' 5.98\")   Wt 85.3 kg (188 lb)   SpO2 98%   BMI 30.36 kg/m²     GEN - NAD, resting comfortably in hospital bed  HEENT - MMM, EOMI, NCAT   CV - RRR by peripheral palpation, limbs wwp  PULM - NWOB on RA  ABD - Non-distended  NEURO - PUENTE spontaneously, CNs II - XII grossly intact  PSYCH - Appropriate mood and affect    MSK:  LLE:   -Post-operative dressing/splint in place without strikethrough bleeding.   -Motor intact in DF/PF/EHL/FHL, SILT in saph/sural/SPN/DPN/tibial distributions  -Foot wwp, brisk cap refill, 2+ DP/PT pulse  -Compartments soft and compressible, no pain with passive dorsiflexion      A/P: 73 y.o. female PMH HLD, GERD, psoriasis, latent TB s/p open biopsy, Curettage, cement augmentation, and ORIF L distal femur pathologic Fx positive for GCT on frozen section on 11/16 with Dr. Gardner.    - Clear liquid diet, okay to advance as tolerated. Bowel Regimen: Colace, senna, dulcolax.  - Multimodal pain therapy: scheduled tylenol, prn oxycodone, dilaudid prn for breakthrough  - mIVF to continue until patient is tolerating good PO intake, HLIV w/ good PO; Will monitor BMP on POD 1 and prn thereafter  - Weightbearing: NWB RLE, ROMAT and encouraged. PT/OT consult, to see by POD1 at the latest.   - Encourage IS   - Perioperative ancef q8 for 24 hours  - No indication for transfusion; monitor CBC POD1, repeat prn thereafter.  - DVT PPx: SCD, ASA 81 mg BID for chemoPPx   - Maintain PIV   - Drain: None  - Continue home meds: PPI  - Glycemic: No issues   - pending final pathology sent from OR, frozen sections c/w GCT    Dispo: Pending PT    This plan was discussed with the attending,  " Jj.    Orthopedic Tumor Team:     Sumanth Vinson, PGY-4 ([id number]), Epic Chat Preferred    Please call on call resident ([phone]) nights after 6pm and weekends

## 2023-11-17 NOTE — PROGRESS NOTES
Occupational Therapy  Occupational Therapy    Evaluation    Patient Name: Blanquita Morales  MRN: [id number]  Today's Date: 11/17/2023  Time Calculation  Start Time: 0837  Stop Time: 0906  Time Calculation (min): 29 min    Assessment  IP OT Assessment  OT Assessment: Pt completed functional bed mobility with min assist x 1, able to complete sit to stand transfer with CGA using a wheeled walker. required CGA with bed to chair transfer, anticipate mod assist with lower body dressing. Pt declined donning pants this date.  Evaluation/Treatment Tolerance: Patient tolerated treatment well  Medical Staff Made Aware: Yes  End of Session Communication: Bedside nurse  End of Session Patient Position: Up in chair, Alarm on  Plan:  Treatment Interventions: ADL retraining, Functional transfer training  OT Frequency: 3 times per week  OT Discharge Recommendations: Low intensity level of continued care  OT Recommended Transfer Status: Minimal assist  OT - OK to Discharge: Yes    Subjective   Current Problem:  1. Closed fracture of femur, unspecified fracture morphology, unspecified laterality, unspecified portion of femur, initial encounter (CMS/[facility])  Case Request Operating Room: Open Reduction Internal Fixation Distal Femur, Biopsy Bone Lower Extremity    Case Request Operating Room: Open Reduction Internal Fixation Distal Femur, Biopsy Bone Lower Extremity    Surgical Pathology Exam    Surgical Pathology Exam        General:  Reason for Referral: Fall, distal femur fx, s/p open biopsy, Curettage, cement augmentation, and ORIF L distal femur pathologic  Past Medical History Relevant to Rehab: PMH HLD, GERD, psoriasis, latent TB  Prior to Session Communication: Bedside nurse  Patient Position Received: Up in chair, Alarm on  Family/Caregiver Present: Yes (daughter)  General Comment: Pt speaks limited English, pt's daughter was able to traslate over the phone. Very pleasant and cooperative.   Precautions:  LE Weight Bearing Status:  Toe-touch weight bearing   Medical Precautions: Fall precautions  Vital Signs:     Pain:  Pain Assessment  Pain Assessment: 0-10  Pain Score: 5 - Moderate pain  Pain Type: Acute pain  Pain Location:  (Leg)  Pain Orientation: Left  Lines/Tubes/Drains:         Objective   Cognition:  Overall Cognitive Status: Within Functional Limits  Orientation Level: Oriented X4           Home Living:  Type of Home: House  Lives With:  (adult daughter)  Home Adaptive Equipment: Cane, Walker rolling or standard (rollator)  Home Layout: One level  Home Access: Stairs to enter with rails  Entrance Stairs-Rails: Both  Home Living Comments: has a reacher   Prior Function:  Level of [place]: Independent with ADLs and functional transfers (recent use of cane past couple months 2/2 knee pain)  ADL Assistance: Independent  IADL History:     ADL:  Eating Assistance: Independent  Grooming Assistance: Stand by (anticipate)  LE Dressing Assistance: Moderate (anticipate)  LE Dressing Deficit: Thread LLE into pants  Toileting Assistance with Device: Maximal  Toileting Deficit:  (purewick)  ADL Comments: Pt with decreased independence with donning pants due to limited ability to reach L LE, and TTWB LLE. Anticipate mod assist with lower body dressing.  Activity Tolerance:     Bed Mobility/Transfers: Bed Mobility  Bed Mobility: Yes  Bed Mobility 1  Bed Mobility 1: Supine to sitting  Level of Assistance 1: Minimum assistance  Bed Mobility Comments 1: HOB elevated.   and Transfers  Transfer: Yes  Transfer 1  Transfer From 1: Sit to  Transfer to 1: Stand  Technique 1: Stand pivot  Transfer Device 1: Walker  Transfer Level of Assistance 1: Contact guard  Trials/Comments 1: Pt was able to maintain LLE WB precautions while transfering from the bed to the chair.  Modalities:     IADL's:      Vision: Vision - Basic Assessment  Current Vision: No visual deficits     Coordination:  Movements are Fluid and Coordinated: Yes   Hand Function:      Extremities: RUE   RUE : Within Functional Limits, LUE   LUE: Within Functional Limits,  , and        Outcome Measures: [facility] Daily Activity  Putting on and taking off regular lower body clothing: A lot  Bathing (including washing, rinsing, drying): A lot  Putting on and taking off regular upper body clothing: A little  Toileting, which includes using toilet, bedpan or urinal: A lot  Taking care of personal grooming such as brushing teeth: A little  Eating Meals: A little  Daily Activity - Total Score: 15         ,     OT Adult Other Outcome Measures  4AT: Negative    Education Documentation  Body Mechanics, taught by Greg Newman OT at 11/17/2023 11:48 AM.  Learner: Patient  Readiness: Acceptance  Method: Explanation  Response: Verbalizes Understanding    Precautions, taught by Greg Newman OT at 11/17/2023 11:48 AM.  Learner: Patient  Readiness: Acceptance  Method: Explanation  Response: Verbalizes Understanding    Education Comments  No comments found.        Goals:   Encounter Problems       Encounter Problems (Active)       ADLs       Patient will perform UB and LB bathing  with Mod I level of assistance and raised toilet seat, grab bars, bedside commode, and shower chair. (Progressing)       Start:  11/17/23    Expected End:  12/08/23            Patient with complete lower body dressing with modified independent level of assistance donning and doffing all LE clothes  with no adaptive equipment while supine in bed, supported sitting, and edge of bed  (Progressing)       Start:  11/17/23    Expected End:  12/08/23               MOBILITY       Patient will perform Functional mobility min Household distances/Community Distances with modified independent level of assistance and least restrictive device in order to improve safety and functional mobility. (Progressing)       Start:  11/17/23    Expected End:  12/08/23               TRANSFERS       Patient will perform bed mobility modified independent level  of assistance and bed rails in order to improve safety and independence with mobility (Progressing)       Start:  11/17/23    Expected End:  12/08/23            Patient will complete functional transfer to  with least restrictive device with modified independent level of assistance. (Progressing)       Start:  11/17/23    Expected End:  12/08/23 11/17/23 at 11:51 AM   WESLEY Little/L, BENNY  Rehab Office: [phone]

## 2023-11-17 NOTE — OP NOTE
Open Reduction Internal Fixation Distal Femur (L), Biopsy Bone Lower Extremity (L) Operative Note     Date: 11/15/2023 - 2023  OR Location: [facility] OR    Name: Blanquita Morales : 1950, Age: 73 y.o., MRN: [id number], Sex: female    Diagnosis  Pre-op Diagnosis     * Closed fracture of femur, unspecified fracture morphology, unspecified laterality, unspecified portion of femur, initial encounter (CMS/[facility]) [S72.90XA] Post-op Diagnosis     * Closed fracture of femur, unspecified fracture morphology, unspecified laterality, unspecified portion of femur, initial encounter (CMS/[facility]) [S72.90XA]     Procedures  Open Reduction Internal Fixation Distal Femur   - CT OPEN TX FEMORAL SUPRACONDYLAR FRACTURE W/XTN    Biopsy Bone Lower Extremity   - CT BIOPSY BONE OPEN DEEP      Surgeons      * Wilmer Gardner - Primary    Resident/Fellow/Other Assistant:  Surgeon(s) and Role:     * Sumanth Vinson MD - Resident - Assisting    Procedure Summary  Anesthesia: General  ASA: III  Anesthesia Staff: Anesthesiologist: Brady Contreras DO; Rhiannon Haney MD; Emi Morales MD  Anesthesia Resident: Kaur Vora MD; Jet Magaña MD; Cecil Bonds MD  Estimated Blood Loss: 250mL  Intra-op Medications:   Medication Name Total Dose   sodium chloride 0.9 % irrigation solution 4,000 mL   vancomycin (Vancocin) vial for injection 1 g   tobramycin (Nebcin) injection 1,200 mg   HYDROmorphone (Dilaudid) injection 0.2 mg 1 mg              Anesthesia Record               Intraprocedure I/O Totals          Intake    Propofol Drip 0.00 mL    The total shown is the total volume documented since Anesthesia Start was filed.    lactated Ringer's infusion 1300.00 mL    Total Intake 1300 mL          Specimen:   ID Type Source Tests Collected by Time   1 : LEFT FEMUR LESION Tissue SOFT TISSUE BIOPSY SURGICAL PATHOLOGY EXAM Wilmer Gardner MD 2023 1715   2 : left femur lesion #2 Tissue SOFT TISSUE RESECTION SURGICAL  PATHOLOGY EXAM Wilmer Gardner MD 11/16/2023 1311        Staff:   Circulator: Gayathri Chavez RN  Relief Circulator: Muna Lakhani RN  Relief Scrub: Bryan Price  Scrub Person: Ji Mejia         Drains and/or Catheters: * None in log *    Tourniquet Times:     Total Tourniquet Time Documented:  Thigh (Left) - 37 minutes  Total: Thigh (Left) - 37 minutes      Implants:  Implants       Type Name Action Serial No.      Joint CEMENT, BONE SIMPLEX P W/TOBRA - [id number] Implanted      Joint CEMENT, BONE SIMPLEX P W/TOBRA - [id number] Implanted      Joint CEMENT, BONE SIMPLEX P W/TOBRA - [id number] Implanted      Screw PLATE, CONDYLAR 4.5 X 195 8H LT CVD VA-LCP - [id number] Implanted       75MM OPTILINK SCREW Implanted      Screw SCREW, LOCK ANKUSH VA 5.0 X 80 - [id number] Implanted      Screw SCREW, LOCK ANKUSH VA 5.0 X 70 - [id number] Implanted      Screw SCREW, CORTICAL, SELF-TAPPING, 4.5 X 40 MM, STAINLESS STEEL - [id number] Implanted      Screw SCREW, CORTICAL, SELF-TAPPING, 4.5 X 32 MM, STAINLESS STEEL - [id number] Implanted      Screw SCREW, CORTICAL, SELF-TAPPING, 4.5 X 34 MM, STAINLESS STEEL - [id number] Implanted               Findings: As above    Indications: Blanquita Morales is an 73 y.o. female who is having surgery for Closed fracture of femur, unspecified fracture morphology, unspecified laterality, unspecified portion of femur, initial encounter (CMS/[facility]) [S72.90XA].     The patient was seen in the preoperative area. The risks, benefits, complications, treatment options, non-operative alternatives, expected recovery and outcomes were discussed with the patient. The possibilities of reaction to medication, pulmonary aspiration, injury to surrounding structures, bleeding, recurrent infection, the need for additional procedures, failure to diagnose a condition, and creating a complication requiring transfusion or operation were discussed with the patient. The patient concurred with the proposed plan, giving informed  consent.  The site of surgery was properly noted/marked if necessary per policy. The patient has been actively warmed in preoperative area. Preoperative antibiotics have been ordered and given within 1 hours of incision. Venous thrombosis prophylaxis have been ordered including unilateral sequential compression device    Preoperative diagnosis: Left pathologic distal femur fracture      Postoperative diagnosis: Same     Procedure: Open biopsy left distal femur lesion (CPT 16766), curettage with cement placement (CPT 32341), operative fixation left intercondylar femur fracture with intercondylar extension (CPT 24671)     Date of Procedure: 11/16/2023     Attending Surgeon: Wilmer Gardner MD     Assistants: Sumanth Vinson MD    Anesthesia: General     Estimated blood loss: 250 cc     Intraoperative findings: As above, 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  The surgical site was signed and I performed a timeout in the operating room. The patient received prophylactic antibiotics as well as TXA prior to skin incision.     Details of procedure:  Patient was taken to the operating room and placed on the operating table in supine position.  At this point general anesthesia was administered.  After induction the anesthesia team to control the patient cervical spine as well as airway.  All bony prominences were properly identified well-padded.  The left lower extremity was then prepped and draped in sterile orthopedic condition.  Once drapes were in place a timeout procedure was performed confirming appropriate patient identity, surgical site as well as procedure to be performed.  Once all in the room were in agreement we would proceed with the case.    We were able to localize the lesion under fluoroscopic guidance to make a small incision on the  lateral aspect of the patient's leg.  We decided to go through this approach because we can still convert this to a distal femoral placement if this turned out to be a sarcoma and in addition because of the intercondylar fracture extension the joint was already technically contaminated so by coming down this approach we were not causing any further contamination.  Skin was incised with a 10 blade followed electrocautery to obtain hemostasis.  We then dissected directed down through the skin and subcutaneous joint through IT band through the small incision.  We came directly down on the lateral aspect of the distal femur and utilizing fluoroscopic guidance to confirm we are in the appropriate spot based on preoperative imaging we then recruit a small corticotomy in the lateral aspect of the femur.  At this point multiple specimens were obtained from this lesion and sent off for frozen section.  At this point a purposeful pause was initiated while we awaited final word from the pathology team and the frozen room.  This was consistent with giant cell tumor of bone.  At this point we have to proceed with remaining was the case.    The left lower extremity was elevated and tourniquet inflated to 300 mmHg.  We extended our incision proximally distally utilizing a 10 blade followed by electrocautery to obtain hemostasis.  We were able to find a pocket which was submuscular underneath the vastus lateralis for eventual sliding of her plate.  The IT band was split in line with our incision to create more room as well for us to work during our curettage.  At this point we then extended our cortical window by utilizing a small drill bit and creating an ellipsoid area on the lateral aspect the femur.  This was then followed by an osteotome as the lateral cortex was removed from the tumor cavity.  We then were able to perform a thorough and aggressive curettage of the cavity removing the specimen while avoiding contamination of  the soft tissues on the lateral aspect the femur and this was collected in a specimen cup to be sent off for final pathology.  Once we are satisfied that we had removed all tumor from the cavity and we were very satisfied with the extent of our curettage we then took fluoroscopic images to confirm the extent on both an AP and lateral fluoroscopic image.  At this point we are satisfied with the extent of the curettage and the specimen was sent off for final pathology.  We then utilized a high-speed bur which was utilized to then sequentially extend our curettage and as we burred out the entire cavity in all directions.  Once we satisfied with the extent of our bur we then used an argon beam to then additionally try to reduce her tumor burden from a microscopic the standpoint throughout the entire tumor cavity.  At this point we then finally irrigated the entire cavity with Irrisept solution.  Cement was mixed on the back table and were able to insert cement into our tumor cavity once it was at the appropriate consistency and taken AP and lateral fluoroscopic view to confirm that the cement was adequately filling her cavity but also that it was not leaking into the joint through our fracture line.  At this point we then waited for cement to harden and took an additional final AP and lateral fluoroscopic films with the cement place to confirm we had excellent fill of our cavity.  We then chose an appropriately sized Synthes VA distal femoral locking plate which was designed to extend beyond the small fracture line that extended from the lesion proximally.  We utilized preoperative imaging to measure the length of our plate to confirm that this came up above the level of fracture.  We are satisfied with reduction at the joint this point were able to slide the plate in a submuscular fashion confirming it was then the appropriate place on AP and lateral fluoroscopic films.  It was then pinned into place distally and  "confirmed to be in the appropriate position on the lateral proximally.  A small stab incision was made through the targeter and we able to pin the plate proximally essentially \"closing the box\" so that we could then begin to insert screws.  We then were able to drill, measure and insert under fluoroscopic guidance through the cement multiple locking screws to the distal cluster of the plate.  Utilizing the targeting arm we are then able to drill, measure and insert for cortical screws through the proximal end of the plate making sure we have multiple screws proximal to the end of our fracture line in the proximal end of the plate.  The jig was removed and final AP and lateral fluoroscopic films were obtained confirming excellent fracture reduction as well as good position of all orthopedic hardware in good position of our cement with excellent fill of our tumor cavity.  The tourniquet was deflated and we had excellent hemostasis.  Was were irrigated with sterile saline.  Vancomycin and tobramycin powder placed deep within the wound along the plate.  The IT band was reapproximated utilizing running 0 Vicryl suture.  2-0 Vicryl suture was used for subcutaneous tissue followed by staples for skin.  Dry sterile dressing was applied followed by large Ace bandage.  Patient was awakened by anesthesia staff without complication and overall tolerated procedure very well.  She was taken to PACU in stable condition.    Post Operative Plan: Patient will be toe-touch weightbearing on the left lower extremity.  Should be permitted to perform range of motion as tolerated in the left leg.  Aspirin 81 mg twice daily will be given for DVT prophylaxis and should be instructed to follow-up in the clinic in 2 to 4 weeks with repeat left knee films as well as for wound check.     Note dictated with [employer]  transcription software. Completed without full typed error editing and sent to avoid delay.    Attending Attestation: I was " present and scrubbed for the entire procedure.    Wilmer Gardner  Phone Number: [phone]

## 2023-11-17 NOTE — DISCHARGE INSTRUCTIONS
Weight Bearing Instructions:  You may be non-weight bearing but range of motion as tolerated on your right lower extremity at all times.      Call your provider if:   Call if any drainage after 7 days, increased redness/warmth/swelling at incision site, pain/tenderness of calf, swelling of calf that does not respond to elevation, SOB/chest pain.    Dressing instructions:   Remove operative dressing from incision 7 days after surgery.  (May remove 1 day earlier or later depending on homecare nursing visit).  Wound may be open to air when dry. If there is continued drainage, cover with an abdominal pad and ACE wrap.  If the operative dressing was changed prior to 7 days post op, then remove the dressing 7 days from the time it was placed.    Medications:  You were given opioid pain medication for a short period. Please wean off of this as possible. You may take Tylenol as prescribed, avoid tylenol if you were given Norco or Percocet.     You were given Aspirin twice a day for DVT prophylaxis to prevent blood clots. Please take this medication as scheduled until complete.     You were given colace for constipation. Please take as needed.     Follow up:  Please call to schedule a follow-up appointment with your orthopedic surgeon in 2-3 weeks.

## 2023-11-17 NOTE — SIGNIFICANT EVENT
Rapid Response RN Note    Rapid response RN at bedside for RADAR score 6 due to the following VS: T 35.6 °Celsius; HR 94 ; RR 17; BP 98/62; SPO2 93%.     Reviewed abnormal VS with bedside RN, who then rechecked them himself and found marked improvements in both BP and O2 saturation.  No interventions by rapid response team indicated at this time.

## 2023-11-17 NOTE — ANESTHESIA POSTPROCEDURE EVALUATION
Patient: Blanquita Morales    Procedure Summary       Date: 11/16/23 Room / Location: [facility] OR 10 / Virtual [facility]    Anesthesia Start: 1645 Anesthesia Stop: 2033    Procedures:       Open Reduction Internal Fixation Distal Femur (Left: Thigh - Leg Upper)      Biopsy Bone Lower Extremity (Left: Thigh - Leg Upper) Diagnosis:       Closed fracture of femur, unspecified fracture morphology, unspecified laterality, unspecified portion of femur, initial encounter (CMS/[facility])      (Closed fracture of femur, unspecified fracture morphology, unspecified laterality, unspecified portion of femur, initial encounter (CMS/[facility]) [S72.90XA])    Surgeons: Wilmer Gardner MD Responsible Provider: Brady Contreras DO    Anesthesia Type: general ASA Status: 3            Anesthesia Type: general    Vitals Value Taken Time   /64 11/16/23 2033   Temp 36.5 11/16/23 2033   Pulse 89 11/16/23 2033   Resp 14 11/16/23 2033   SpO2 100 11/16/23 2033       Anesthesia Post Evaluation    Patient location during evaluation: bedside  Patient participation: complete - patient participated  Level of consciousness: awake  Pain score: 0  Pain management: adequate  Airway patency: patent  Cardiovascular status: acceptable  Respiratory status: acceptable  Hydration status: acceptable  Postoperative Nausea and Vomiting: none        No notable events documented.

## 2023-11-18 ENCOUNTER — HOME HEALTH ADMISSION (OUTPATIENT)
Dept: HOME HEALTH SERVICES | Facility: HOME HEALTH | Age: 73
End: 2023-11-18
Payer: MEDICARE

## 2023-11-18 VITALS
BODY MASS INDEX: 30.22 KG/M2 | DIASTOLIC BLOOD PRESSURE: 70 MMHG | HEIGHT: 66 IN | RESPIRATION RATE: 18 BRPM | SYSTOLIC BLOOD PRESSURE: 112 MMHG | HEART RATE: 84 BPM | TEMPERATURE: 97.2 F | OXYGEN SATURATION: 96 % | WEIGHT: 188 LBS

## 2023-11-18 LAB
ERYTHROCYTE [DISTWIDTH] IN BLOOD BY AUTOMATED COUNT: 14.9 % (ref 11.5–14.5)
HCT VFR BLD AUTO: 32 % (ref 36–46)
HGB BLD-MCNC: 10.1 G/DL (ref 12–16)
MCH RBC QN AUTO: 27.1 PG (ref 26–34)
MCHC RBC AUTO-ENTMCNC: 31.6 G/DL (ref 32–36)
MCV RBC AUTO: 86 FL (ref 80–100)
NRBC BLD-RTO: 0 /100 WBCS (ref 0–0)
PLATELET # BLD AUTO: 254 X10*3/UL (ref 150–450)
RBC # BLD AUTO: 3.73 X10*6/UL (ref 4–5.2)
WBC # BLD AUTO: 8.6 X10*3/UL (ref 4.4–11.3)

## 2023-11-18 PROCEDURE — 2500000001 HC RX 250 WO HCPCS SELF ADMINISTERED DRUGS (ALT 637 FOR MEDICARE OP): Performed by: STUDENT IN AN ORGANIZED HEALTH CARE EDUCATION/TRAINING PROGRAM

## 2023-11-18 PROCEDURE — 2500000001 HC RX 250 WO HCPCS SELF ADMINISTERED DRUGS (ALT 637 FOR MEDICARE OP)

## 2023-11-18 PROCEDURE — 97116 GAIT TRAINING THERAPY: CPT | Mod: GP,CQ

## 2023-11-18 PROCEDURE — 36415 COLL VENOUS BLD VENIPUNCTURE: CPT | Performed by: STUDENT IN AN ORGANIZED HEALTH CARE EDUCATION/TRAINING PROGRAM

## 2023-11-18 PROCEDURE — 85027 COMPLETE CBC AUTOMATED: CPT | Performed by: STUDENT IN AN ORGANIZED HEALTH CARE EDUCATION/TRAINING PROGRAM

## 2023-11-18 RX ORDER — OXYCODONE HYDROCHLORIDE 5 MG/1
5 TABLET ORAL EVERY 4 HOURS PRN
Qty: 42 TABLET | Refills: 0 | Status: SHIPPED | OUTPATIENT
Start: 2023-11-18 | End: 2023-11-25

## 2023-11-18 RX ORDER — DOCUSATE SODIUM 100 MG/1
100 CAPSULE, LIQUID FILLED ORAL 2 TIMES DAILY PRN
Qty: 30 CAPSULE | Refills: 0 | Status: SHIPPED | OUTPATIENT
Start: 2023-11-18 | End: 2023-12-03

## 2023-11-18 RX ORDER — ASPIRIN 81 MG/1
81 TABLET ORAL 2 TIMES DAILY
Qty: 84 TABLET | Refills: 0 | Status: SHIPPED | OUTPATIENT
Start: 2023-11-18 | End: 2023-12-30

## 2023-11-18 RX ORDER — ACETAMINOPHEN 325 MG/1
650 TABLET ORAL EVERY 6 HOURS PRN
Qty: 120 TABLET | Refills: 0 | Status: SHIPPED | OUTPATIENT
Start: 2023-11-18 | End: 2023-12-03

## 2023-11-18 RX ADMIN — Medication 5000 UNITS: at 09:11

## 2023-11-18 RX ADMIN — ACETAMINOPHEN 650 MG: 325 TABLET ORAL at 05:36

## 2023-11-18 RX ADMIN — ACETAMINOPHEN 650 MG: 325 TABLET ORAL at 00:25

## 2023-11-18 RX ADMIN — Medication 1 TABLET: at 09:11

## 2023-11-18 RX ADMIN — ASPIRIN 81 MG: 81 TABLET, COATED ORAL at 09:11

## 2023-11-18 RX ADMIN — ACETAMINOPHEN 650 MG: 325 TABLET ORAL at 12:00

## 2023-11-18 ASSESSMENT — PAIN SCALES - GENERAL
PAINLEVEL_OUTOF10: 0 - NO PAIN
PAINLEVEL_OUTOF10: 5 - MODERATE PAIN
PAINLEVEL_OUTOF10: 0 - NO PAIN

## 2023-11-18 ASSESSMENT — COGNITIVE AND FUNCTIONAL STATUS - GENERAL
CLIMB 3 TO 5 STEPS WITH RAILING: TOTAL
MOVING TO AND FROM BED TO CHAIR: A LITTLE
TURNING FROM BACK TO SIDE WHILE IN FLAT BAD: A LITTLE
MOBILITY SCORE: 15
STANDING UP FROM CHAIR USING ARMS: A LITTLE
WALKING IN HOSPITAL ROOM: A LOT
MOVING FROM LYING ON BACK TO SITTING ON SIDE OF FLAT BED WITH BEDRAILS: A LITTLE

## 2023-11-18 ASSESSMENT — PAIN - FUNCTIONAL ASSESSMENT: PAIN_FUNCTIONAL_ASSESSMENT: 0-10

## 2023-11-18 NOTE — PROGRESS NOTES
Physical Therapy    Physical Therapy Treatment    Patient Name: Blanquita Morales  MRN: [id number]  Today's Date: 11/18/2023  Time Calculation  Start Time: 0932  Stop Time: 1003  Time Calculation (min): 31 min       Assessment/Plan   PT Assessment  End of Session Communication: Bedside nurse  Assessment Comment: Discussed pt status with supervising physical therapist. (pt unable to successfully negoitate steps with NWB status. educated caregiver(son) and patient that MOD intensity PT would be the appropriate rec secondary to pt have steps to get into home. Family and pt politely declined.)  PT Plan  Inpatient/Swing Bed or Outpatient: Inpatient  PT Plan  Treatment/Interventions: Bed mobility, Gait training, Stair training, Transfer training, Balance training, Strengthening, Endurance training, Range of motion, Therapeutic exercise, Therapeutic activity  PT Plan: Skilled PT  PT Frequency: 5 times per week  PT Discharge Recommendations: Low intensity level of continued care  Equipment Recommended upon Discharge:  (rental WC to safely bump up steps for home going.)  PT Recommended Transfer Status: Assist x1, Assistive device  PT - OK to Discharge: Yes      General Visit Information:   PT  Visit  PT Received On: 11/18/23  Response to Previous Treatment: Patient with no complaints from previous session.  General  Family/Caregiver Present: Yes  Prior to Session Communication: Bedside nurse  Patient Position Received: Bed, 3 rail up, Alarm on  General Comment:  (pt pleasant and cooperative. pt able to progress ambulation distances on this date with minimal increase in LLE pain.)    Subjective   Precautions:  Precautions  LE Weight Bearing Status: Left Non-Weight Bearing  Medical Precautions: Fall precautions       Objective   Pain:  Pain Assessment  Pain Assessment: 0-10  Pain Score: 5 - Moderate pain (minimal pain at rest, increased pain with mobility.)  Pain Type: Surgical pain  Pain Location: Leg  Pain Orientation: Left  Pain  Interventions: Repositioned  Cognition:  Cognition  Overall Cognitive Status: Within Functional Limits  Orientation Level: Oriented X4  Activity Tolerance:  Activity Tolerance  Endurance: Tolerates 30 min exercise with multiple rests  Treatments:  Therapeutic Exercise  Therapeutic Exercise Performed: No     Bed Mobility  Bed Mobility: Yes  Bed Mobility 1  Bed Mobility 1: Supine to sitting  Level of Assistance 1: Minimum assistance  Bed Mobility Comments 1:  (assist to advance LLE)    Ambulation/Gait Training  Ambulation/Gait Training Performed: Yes  Ambulation/Gait Training 1  Surface 1: Level tile  Device 1: Rolling walker  Assistance 1: Contact guard  Comments/Distance (ft) 1: 8', 20', 10', 20'  Transfers  Transfer: Yes  Transfer 1  Transfer From 1: Sit to, Stand to  Transfer to 1: Stand, Sit  Technique 1: Sit to stand, Stand to sit  Transfer Device 1: Walker  Transfer Level of Assistance 1: Contact guard    Stairs  Stairs: Yes  Stairs  Rails 1: Bilateral  Device 1: Railing  Assistance 1: Maximum assistance  Comment/Number of Steps 1:  (pt unable to complete 1 step with NWB. PTA educated and demoed to caregiver and patient proper bumping technique to safely negoiate steps when home going.)    Outcome Measures:  [facility] Basic Mobility  Turning from your back to your side while in a flat bed without using bedrails: A little  Moving from lying on your back to sitting on the side of a flat bed without using bedrails: A little  Moving to and from bed to chair (including a wheelchair): A little  Standing up from a chair using your arms (e.g. wheelchair or bedside chair): A little  To walk in hospital room: A lot  Climbing 3-5 steps with railing: Total  Basic Mobility - Total Score: 15    Education Documentation  Precautions, taught by Calos Smith PTA at 11/18/2023 10:33 AM.  Learner: Patient  Readiness: Acceptance  Method: Explanation  Response: Verbalizes Understanding    Body Mechanics, taught by Calos Smith PTA  at 11/18/2023 10:33 AM.  Learner: Patient  Readiness: Acceptance  Method: Explanation  Response: Verbalizes Understanding    Mobility Training, taught by Calos Smith PTA at 11/18/2023 10:33 AM.  Learner: Patient  Readiness: Acceptance  Method: Explanation  Response: Verbalizes Understanding    Education Comments  No comments found.        OP EDUCATION:       Encounter Problems       Encounter Problems (Active)       PT Problem       Patient will complete supine to sit and sit to supine Supervision  (Progressing)       Start:  11/17/23    Expected End:  12/01/23            Patient will perform sit<>stand transfer with Rolling Walker, and Supervision  (Progressing)       Start:  11/17/23    Expected End:  12/01/23            Patient will ambulate >30' with Rolling Walker and Supervision and NWB LLE  (Progressing)       Start:  11/17/23    Expected End:  12/01/23            Patient will ascend/descend 3 steps with Bilateral Rails and CGA  (Not Progressing)       Start:  11/17/23    Expected End:  12/01/23

## 2023-11-18 NOTE — PROGRESS NOTES
"Orthopaedic Surgery Progress Note    S:    No acute events. Pain controlled on current regimen.  Denies any new onset numbness, tingling or weakness. Denies nausea, vomiting, chest pain, dyspnea, or calf tenderness. Denies any fever or chills. Cleared for home with PT.     O:  /70 (BP Location: Left arm, Patient Position: Lying)   Pulse 84   Temp 36.2 °C (97.2 °F) (Temporal)   Resp 18   Ht 1.676 m (5' 5.98\")   Wt 85.3 kg (188 lb)   SpO2 96%   BMI 30.36 kg/m²     GEN - NAD, resting comfortably in hospital bed  HEENT - MMM, EOMI, NCAT   CV - RRR by peripheral palpation, limbs wwp  PULM - NWOB on RA  ABD - Non-distended  NEURO - PUENTE spontaneously, CNs II - XII grossly intact  PSYCH - Appropriate mood and affect    MSK:  LLE:   -Post-operative dressing/splint in place without strikethrough bleeding.   -Motor intact in DF/PF/EHL/FHL, SILT in saph/sural/SPN/DPN/tibial distributions  -Foot wwp, brisk cap refill, 2+ DP/PT pulse  -Compartments soft and compressible, no pain with passive dorsiflexion      A/P: 73 y.o. female PMH HLD, GERD, psoriasis, latent TB s/p open biopsy, Curettage, cement augmentation, and ORIF L distal femur pathologic Fx positive for GCT on frozen section on 11/16 with Dr. Gardner.    - Clear liquid diet, okay to advance as tolerated. Bowel Regimen: Colace, senna, dulcolax.  - Multimodal pain therapy: scheduled tylenol, prn oxycodone, dilaudid prn for breakthrough  - mIVF to continue until patient is tolerating good PO intake, HLIV w/ good PO; Will monitor BMP on POD 1 and prn thereafter  - Weightbearing: NWB RLE, ROMAT and encouraged. PT/OT consult, to see by POD1 at the latest.   - Encourage IS   - Perioperative ancef q8 for 24 hours  - No indication for transfusion; monitor CBC POD1, repeat prn thereafter.  - DVT PPx: SCD, ASA 81 mg BID for chemoPPx   - Maintain PIV   - Drain: None  - Continue home meds: PPI  - Glycemic: No issues   - pending final pathology sent from OR, frozen " sections c/w GCT    Dispo: Pending repeat Hgb, dc home with [facility] this afternoon    This plan was discussed with the attending, Dr. Gardner.    Orthopedic Tumor Team:     Sumanth Vinson, PGY-4 ([id number]), Epic Chat Preferred    Please call on call resident ([id number]) nights after 6pm and weekends

## 2023-11-19 ENCOUNTER — DOCUMENTATION (OUTPATIENT)
Dept: ORTHOPEDICS | Facility: HOSPITAL | Age: 73
End: 2023-11-19
Payer: MEDICARE

## 2023-11-19 NOTE — DISCHARGE SUMMARY
Discharge Diagnosis  Closed fracture of femur, unspecified fracture morphology, unspecified laterality, unspecified portion of femur, initial encounter (CMS/[facility])    Issues Requiring Follow-Up  Left femur giant cell tumor    Test Results Pending At Discharge  Pending Labs       Order Current Status    Surgical Pathology Exam In process            Hospital Course   73 year-old female who presented with pathologic fracture of left distal femur. Patient is now s/p Biopsy which was positive on frozen for giant cell tumor, curettage, cementation, and ORIF on 11/16/23 by Dr. Gardner. On the day of surgery, patient was identified in the pre-operative holding area and agreeable to proceed with surgery. Written consent was obtained.  Please see operative note for further details of this procedure. Patient received 24 hours of darby-operative antibiotics. Patient recovered in the PACU before transfer to a regular nursing floor. Patient was started on oxycodone, tylenol, and dilaudid for pain control and ASA 81 mg bid for DVT prophylaxis. Physical therapy recommended continued recovery at home with continued physical therapy and wound care. On the day of discharge, patient was afebrile with stable vital signs. Patient was neurovascularly intact at time of discharge. Patient was discharged with prescription of ASA 81 mg bid for DVT prophylaxis for 6 weeks and a short course of oxycodone for pain control after orthopedic trauma procedure. Patient will follow-up with Dr. Gardner in 3 weeks for post-operative visit to evaluate wound and final pathology.    Home Medications     Medication List      START taking these medications     acetaminophen 325 mg tablet; Commonly known as: Tylenol; Take 2 tablets   (650 mg) by mouth every 6 hours if needed for mild pain (1 - 3) for up to   15 days.   aspirin 81 mg EC tablet; Take 1 tablet (81 mg) by mouth 2 times a day.   docusate sodium 100 mg capsule; Commonly known as: Colace; Take 1    capsule (100 mg) by mouth 2 times a day as needed for constipation for up   to 15 days.   oxyCODONE 5 mg immediate release tablet; Commonly known as: Roxicodone;   Take 1 tablet (5 mg) by mouth every 4 hours if needed for severe pain (7 -   10) or moderate pain (4 - 6) for up to 7 days.     CONTINUE taking these medications     omeprazole 20 mg tablet,delayed release (DR/EC) EC tablet; Commonly   known as: PriLOSEC   Vitamin D3 5,000 Units tablet; Generic drug: cholecalciferol       Outpatient Follow-Up  Future Appointments   Date Time Provider Department [place]   12/5/2023  9:40 AM Ulisses Cooper MD [id number]   12/26/2023 10:15 AM [id number] [facility] [id number] [facility]        Sumanth Vinson MD

## 2023-11-20 ENCOUNTER — HOSPITAL ENCOUNTER (OUTPATIENT)
Dept: CARDIOLOGY | Facility: HOSPITAL | Age: 73
Discharge: HOME | End: 2023-11-20
Payer: MEDICARE

## 2023-11-20 ENCOUNTER — HOME CARE VISIT (OUTPATIENT)
Dept: HOME HEALTH SERVICES | Facility: HOME HEALTH | Age: 73
End: 2023-11-20
Payer: MEDICARE

## 2023-11-20 VITALS
HEART RATE: 86 BPM | DIASTOLIC BLOOD PRESSURE: 70 MMHG | RESPIRATION RATE: 18 BRPM | TEMPERATURE: 97.6 F | SYSTOLIC BLOOD PRESSURE: 122 MMHG

## 2023-11-20 LAB
ALBUMIN: 3.8 G/DL (ref 3.4–5)
ALPHA 1 GLOBULIN: 0.4 G/DL (ref 0.2–0.6)
ALPHA 2 GLOBULIN: 0.9 G/DL (ref 0.4–1.1)
ATRIAL RATE: 103 BPM
ATRIAL RATE: 95 BPM
BETA GLOBULIN: 1 G/DL (ref 0.5–1.2)
GAMMA GLOBULIN: 0.8 G/DL (ref 0.5–1.4)
IMMUNOFIXATION COMMENT: NORMAL
P AXIS: 65 DEGREES
P AXIS: 67 DEGREES
P OFFSET: 214 MS
P OFFSET: 214 MS
P ONSET: 154 MS
P ONSET: 154 MS
PATH REVIEW - SERUM IMMUNOFIXATION: NORMAL
PATH REVIEW-SERUM PROTEIN ELECTROPHORESIS: NORMAL
PR INTERVAL: 136 MS
PR INTERVAL: 136 MS
PROTEIN ELECTROPHORESIS COMMENT: NORMAL
Q ONSET: 222 MS
Q ONSET: 222 MS
QRS COUNT: 15 BEATS
QRS COUNT: 17 BEATS
QRS DURATION: 84 MS
QRS DURATION: 88 MS
QT INTERVAL: 334 MS
QT INTERVAL: 356 MS
QTC CALCULATION(BAZETT): 437 MS
QTC CALCULATION(BAZETT): 447 MS
QTC FREDERICIA: 400 MS
QTC FREDERICIA: 414 MS
R AXIS: 50 DEGREES
R AXIS: 54 DEGREES
T AXIS: 23 DEGREES
T AXIS: 52 DEGREES
T OFFSET: 389 MS
T OFFSET: 400 MS
VENTRICULAR RATE: 103 BPM
VENTRICULAR RATE: 95 BPM

## 2023-11-20 PROCEDURE — 1090000001 HH PPS REVENUE CREDIT

## 2023-11-20 PROCEDURE — G0151 HHCP-SERV OF PT,EA 15 MIN: HCPCS | Mod: HHH

## 2023-11-20 PROCEDURE — 169592 NO-PAY CLAIM PROCEDURE

## 2023-11-20 PROCEDURE — 93005 ELECTROCARDIOGRAM TRACING: CPT

## 2023-11-20 PROCEDURE — 1090000002 HH PPS REVENUE DEBIT

## 2023-11-20 PROCEDURE — 0023 HH SOC

## 2023-11-20 ASSESSMENT — ENCOUNTER SYMPTOMS
PAIN: 1
PAIN SEVERITY GOAL: 0/10
SUBJECTIVE PAIN PROGRESSION: UNCHANGED
PAIN LOCATION - PAIN SEVERITY: 3/10
PAIN LOCATION: LEFT LEG
PERSON REPORTING PAIN: PATIENT
LOWEST PAIN SEVERITY IN PAST 24 HOURS: 3/10
HIGHEST PAIN SEVERITY IN PAST 24 HOURS: 9/10

## 2023-11-20 NOTE — Clinical Note
Good morning. I performed the PT start of care on Blanquita yesterday. She is doing as good as can be expected under her current restrictions of NWB on L LE. She has 30 degrees flexion available in L knee and we will work on gentle ROM and mobility twice weekly until she is cleared for outpatient, although we might reduce frequency if she becomes comfortable with what she is able to do under current restrictions. Thank you.

## 2023-11-21 ENCOUNTER — APPOINTMENT (OUTPATIENT)
Dept: PHYSICAL THERAPY | Facility: CLINIC | Age: 73
End: 2023-11-21
Payer: MEDICARE

## 2023-11-21 DIAGNOSIS — S72.90XA CLOSED FRACTURE OF FEMUR, UNSPECIFIED FRACTURE MORPHOLOGY, UNSPECIFIED LATERALITY, UNSPECIFIED PORTION OF FEMUR, INITIAL ENCOUNTER (MULTI): ICD-10-CM

## 2023-11-21 PROCEDURE — 1090000001 HH PPS REVENUE CREDIT

## 2023-11-21 PROCEDURE — 1090000002 HH PPS REVENUE DEBIT

## 2023-11-21 ASSESSMENT — ACTIVITIES OF DAILY LIVING (ADL)
OASIS_M1830: 05
AMBULATION ASSISTANCE ON FLAT SURFACES: 1

## 2023-11-21 NOTE — HOME HEALTH
S: This pt was referred to home health PT following a fall with L femur fracture, which then resulted in finding a tumor in the bone. Tests were negative for malignancy. Pt had surgery for ORIF and to remove the mass and is now Non-Weight Bearing on her L LE. She is demonstrating increased difficulty with mobility as a result.    B: Pt lives with alone in a single story house with 5 stairs to exit the building. Pt is sleeping on the couch in the living room due to inability to get into her bed which is high. Pt is using wheeled walker for all ambulation but was independent with ambulation without use of assistive device prior to current episode of care. Pt reports no recent falls and rates current pain level at 3/10. Medications reconciled in the home and educated on risk involved with use of aspirin.    A: Pt presents with decreased L knee ROM at 30 degrees flexion and lacking 5 degrees extension, L LE weakness affecting bed mobility, transfers, gait / stairs and balance, as well as gait and balance impairment as illustrated by Tinetti score of 1/28. Pt requires CGA for management of L LE when lowering foot rest of couch, and for sit to stand transfers. Pt ambulates 6 feet using wheeled walker with CGA demonstrating appropriate technique to maintain NWB status on L LE. Pictures with written instructions of supine, seated and standing B LE AROM exercises issued for HEP. Pt instructed to perform supine ankle pumps, Quad sets, gluteal sets, and gentle heel slides frequently throughout the day. Pt and caregiver instructed in importance of performing HEP frequently to promote   circulation and reduce the risk of DVT, as well as contractures in ankle and knee. PT and caregiver voice good understanding of instructions.    R: Pt will benefit from skilled PT for LE strengthening to facilitate bed mobility, transfers, gait / stairs and balance, as well as transfer, gait and balance training to improve functional mobility and  independence. OT to follow for ADL training.

## 2023-11-22 ENCOUNTER — HOME CARE VISIT (OUTPATIENT)
Dept: HOME HEALTH SERVICES | Facility: HOME HEALTH | Age: 73
End: 2023-11-22
Payer: MEDICARE

## 2023-11-22 VITALS — SYSTOLIC BLOOD PRESSURE: 110 MMHG | DIASTOLIC BLOOD PRESSURE: 72 MMHG | HEART RATE: 88 BPM | TEMPERATURE: 97.4 F

## 2023-11-22 PROCEDURE — G0152 HHCP-SERV OF OT,EA 15 MIN: HCPCS | Mod: HHH

## 2023-11-22 PROCEDURE — G0157 HHC PT ASSISTANT EA 15: HCPCS | Mod: HHH

## 2023-11-22 PROCEDURE — 1090000002 HH PPS REVENUE DEBIT

## 2023-11-22 PROCEDURE — 1090000001 HH PPS REVENUE CREDIT

## 2023-11-22 ASSESSMENT — ENCOUNTER SYMPTOMS
LOWEST PAIN SEVERITY IN PAST 24 HOURS: 2/10
PAIN LOCATION - PAIN SEVERITY: 5/10
PAIN LOCATION: LEFT LEG
HIGHEST PAIN SEVERITY IN PAST 24 HOURS: 10/10
PAIN: 1
PAIN LOCATION - PAIN SEVERITY: 8/10
PAIN: 1
PAIN LOCATION - PAIN QUALITY: ACHE
HIGHEST PAIN SEVERITY IN PAST 24 HOURS: 5/10
LOWEST PAIN SEVERITY IN PAST 24 HOURS: 3/10
SUBJECTIVE PAIN PROGRESSION: GRADUALLY IMPROVING
PERSON REPORTING PAIN: PATIENT
PAIN SEVERITY GOAL: 2/10
PAIN LOCATION: LEFT LEG

## 2023-11-22 ASSESSMENT — ACTIVITIES OF DAILY LIVING (ADL)
DRESSING_UB_CURRENT_FUNCTION: SUPERVISION
BATHING_CURRENT_FUNCTION: STAND BY ASSIST
TOILETING: INDEPENDENT
TOILETING: 1
DRESSING_LB_CURRENT_FUNCTION: SUPERVISION
BATHING ASSESSED: 1
AMBULATION ASSISTANCE: 1
AMBULATION ASSISTANCE: SUPERVISION
DRESSING_LB_CURRENT_FUNCTION: MINIMUM ASSIST

## 2023-11-23 PROCEDURE — 1090000002 HH PPS REVENUE DEBIT

## 2023-11-23 PROCEDURE — 1090000001 HH PPS REVENUE CREDIT

## 2023-11-24 PROCEDURE — 1090000002 HH PPS REVENUE DEBIT

## 2023-11-24 PROCEDURE — 1090000001 HH PPS REVENUE CREDIT

## 2023-11-25 PROCEDURE — 1090000002 HH PPS REVENUE DEBIT

## 2023-11-25 PROCEDURE — 1090000001 HH PPS REVENUE CREDIT

## 2023-11-26 PROCEDURE — 1090000002 HH PPS REVENUE DEBIT

## 2023-11-26 PROCEDURE — 1090000001 HH PPS REVENUE CREDIT

## 2023-11-27 PROCEDURE — 1090000001 HH PPS REVENUE CREDIT

## 2023-11-27 PROCEDURE — 1090000002 HH PPS REVENUE DEBIT

## 2023-11-28 ENCOUNTER — APPOINTMENT (OUTPATIENT)
Dept: PHYSICAL THERAPY | Facility: CLINIC | Age: 73
End: 2023-11-28
Payer: MEDICARE

## 2023-11-28 ENCOUNTER — HOME CARE VISIT (OUTPATIENT)
Dept: HOME HEALTH SERVICES | Facility: HOME HEALTH | Age: 73
End: 2023-11-28
Payer: MEDICARE

## 2023-11-28 VITALS
OXYGEN SATURATION: 98 % | SYSTOLIC BLOOD PRESSURE: 106 MMHG | TEMPERATURE: 97.4 F | DIASTOLIC BLOOD PRESSURE: 80 MMHG | HEART RATE: 100 BPM

## 2023-11-28 PROCEDURE — 1090000001 HH PPS REVENUE CREDIT

## 2023-11-28 PROCEDURE — 1090000002 HH PPS REVENUE DEBIT

## 2023-11-28 PROCEDURE — G0157 HHC PT ASSISTANT EA 15: HCPCS | Mod: HHH

## 2023-11-28 ASSESSMENT — ENCOUNTER SYMPTOMS
LOWEST PAIN SEVERITY IN PAST 24 HOURS: 0/10
PAIN: 1
PAIN LOCATION - PAIN QUALITY: ACHING
PAIN LOCATION - PAIN SEVERITY: 5/10
HIGHEST PAIN SEVERITY IN PAST 24 HOURS: 5/10
PAIN LOCATION: LEFT KNEE

## 2023-11-29 ENCOUNTER — HOME CARE VISIT (OUTPATIENT)
Dept: HOME HEALTH SERVICES | Facility: HOME HEALTH | Age: 73
End: 2023-11-29
Payer: MEDICARE

## 2023-11-29 ENCOUNTER — TELEPHONE (OUTPATIENT)
Dept: PRIMARY CARE | Facility: CLINIC | Age: 73
End: 2023-11-29
Payer: MEDICARE

## 2023-11-29 DIAGNOSIS — J02.9 SORE THROAT: Primary | ICD-10-CM

## 2023-11-29 PROCEDURE — 1090000001 HH PPS REVENUE CREDIT

## 2023-11-29 PROCEDURE — 1090000002 HH PPS REVENUE DEBIT

## 2023-11-29 RX ORDER — AZITHROMYCIN 250 MG/1
TABLET, FILM COATED ORAL
Qty: 6 TABLET | Refills: 0 | Status: SHIPPED | OUTPATIENT
Start: 2023-11-29 | End: 2023-12-04

## 2023-11-29 NOTE — TELEPHONE ENCOUNTER
Patient is sick, has sore throat, mild fever,   Start Z pack, plenty of fluids.  Take Tylenol as needed.  Unable to come to the hospital.  Advised to have COVID testing done.

## 2023-11-30 PROCEDURE — 1090000001 HH PPS REVENUE CREDIT

## 2023-11-30 PROCEDURE — 1090000002 HH PPS REVENUE DEBIT

## 2023-12-01 ENCOUNTER — HOME CARE VISIT (OUTPATIENT)
Dept: HOME HEALTH SERVICES | Facility: HOME HEALTH | Age: 73
End: 2023-12-01
Payer: MEDICARE

## 2023-12-01 PROCEDURE — 1090000002 HH PPS REVENUE DEBIT

## 2023-12-01 PROCEDURE — 1090000001 HH PPS REVENUE CREDIT

## 2023-12-01 PROCEDURE — G0157 HHC PT ASSISTANT EA 15: HCPCS | Mod: HHH

## 2023-12-01 ASSESSMENT — ENCOUNTER SYMPTOMS
LOWEST PAIN SEVERITY IN PAST 24 HOURS: 0/10
PAIN LOCATION: LEFT LEG
SUBJECTIVE PAIN PROGRESSION: GRADUALLY IMPROVING
PAIN LOCATION - PAIN QUALITY: DULL
HIGHEST PAIN SEVERITY IN PAST 24 HOURS: 4/10
PAIN: 1
PAIN LOCATION - PAIN SEVERITY: 0/10

## 2023-12-02 PROCEDURE — 1090000002 HH PPS REVENUE DEBIT

## 2023-12-02 PROCEDURE — 1090000001 HH PPS REVENUE CREDIT

## 2023-12-03 PROCEDURE — 1090000002 HH PPS REVENUE DEBIT

## 2023-12-03 PROCEDURE — 1090000001 HH PPS REVENUE CREDIT

## 2023-12-04 PROCEDURE — 1090000001 HH PPS REVENUE CREDIT

## 2023-12-04 PROCEDURE — 1090000002 HH PPS REVENUE DEBIT

## 2023-12-04 ASSESSMENT — ACTIVITIES OF DAILY LIVING (ADL): ENTERING_EXITING_HOME: MODERATE ASSIST

## 2023-12-05 ENCOUNTER — APPOINTMENT (OUTPATIENT)
Dept: ORTHOPEDIC SURGERY | Facility: HOSPITAL | Age: 73
End: 2023-12-05
Payer: MEDICARE

## 2023-12-05 ENCOUNTER — HOME CARE VISIT (OUTPATIENT)
Dept: HOME HEALTH SERVICES | Facility: HOME HEALTH | Age: 73
End: 2023-12-05
Payer: MEDICARE

## 2023-12-05 LAB
LAB AP ASR DISCLAIMER: NORMAL
LABORATORY COMMENT REPORT: NORMAL
Lab: NORMAL
PATH REPORT.COMMENTS IMP SPEC: NORMAL
PATH REPORT.FINAL DX SPEC: NORMAL
PATH REPORT.GROSS SPEC: NORMAL
PATH REPORT.RELEVANT HX SPEC: NORMAL
PATH REPORT.TOTAL CANCER: NORMAL

## 2023-12-05 PROCEDURE — G0157 HHC PT ASSISTANT EA 15: HCPCS | Mod: HHH

## 2023-12-05 PROCEDURE — G0180 MD CERTIFICATION HHA PATIENT: HCPCS | Performed by: ORTHOPAEDIC SURGERY

## 2023-12-05 PROCEDURE — 1090000001 HH PPS REVENUE CREDIT

## 2023-12-05 PROCEDURE — 1090000002 HH PPS REVENUE DEBIT

## 2023-12-05 ASSESSMENT — ENCOUNTER SYMPTOMS
PERSON REPORTING PAIN: PATIENT
PAIN LOCATION: LEFT KNEE
SUBJECTIVE PAIN PROGRESSION: GRADUALLY IMPROVING
PAIN LOCATION - PAIN QUALITY: ACHE
PAIN: 1
PAIN LOCATION - PAIN SEVERITY: 0/10
HIGHEST PAIN SEVERITY IN PAST 24 HOURS: 7/10
LOWEST PAIN SEVERITY IN PAST 24 HOURS: 0/10

## 2023-12-06 PROCEDURE — 1090000002 HH PPS REVENUE DEBIT

## 2023-12-06 PROCEDURE — 1090000001 HH PPS REVENUE CREDIT

## 2023-12-07 PROCEDURE — 1090000001 HH PPS REVENUE CREDIT

## 2023-12-07 PROCEDURE — 1090000002 HH PPS REVENUE DEBIT

## 2023-12-08 ENCOUNTER — HOME CARE VISIT (OUTPATIENT)
Dept: HOME HEALTH SERVICES | Facility: HOME HEALTH | Age: 73
End: 2023-12-08
Payer: MEDICARE

## 2023-12-08 VITALS
SYSTOLIC BLOOD PRESSURE: 114 MMHG | DIASTOLIC BLOOD PRESSURE: 60 MMHG | HEART RATE: 96 BPM | OXYGEN SATURATION: 97 % | TEMPERATURE: 97.4 F

## 2023-12-08 PROCEDURE — 1090000002 HH PPS REVENUE DEBIT

## 2023-12-08 PROCEDURE — G0157 HHC PT ASSISTANT EA 15: HCPCS | Mod: HHH

## 2023-12-08 PROCEDURE — 1090000001 HH PPS REVENUE CREDIT

## 2023-12-08 ASSESSMENT — ENCOUNTER SYMPTOMS
PAIN: 1
PAIN LOCATION - PAIN SEVERITY: 0/10
SUBJECTIVE PAIN PROGRESSION: GRADUALLY IMPROVING
PAIN LOCATION: LEFT KNEE
LOWEST PAIN SEVERITY IN PAST 24 HOURS: 0/10
HIGHEST PAIN SEVERITY IN PAST 24 HOURS: 5/10

## 2023-12-09 PROCEDURE — 1090000001 HH PPS REVENUE CREDIT

## 2023-12-09 PROCEDURE — 1090000002 HH PPS REVENUE DEBIT

## 2023-12-10 PROCEDURE — 1090000001 HH PPS REVENUE CREDIT

## 2023-12-10 PROCEDURE — 1090000002 HH PPS REVENUE DEBIT

## 2023-12-11 PROCEDURE — 1090000002 HH PPS REVENUE DEBIT

## 2023-12-11 PROCEDURE — 1090000001 HH PPS REVENUE CREDIT

## 2023-12-12 ENCOUNTER — HOME CARE VISIT (OUTPATIENT)
Dept: HOME HEALTH SERVICES | Facility: HOME HEALTH | Age: 73
End: 2023-12-12
Payer: MEDICARE

## 2023-12-12 PROCEDURE — 1090000002 HH PPS REVENUE DEBIT

## 2023-12-12 PROCEDURE — 1090000001 HH PPS REVENUE CREDIT

## 2023-12-13 ENCOUNTER — ANCILLARY PROCEDURE (OUTPATIENT)
Dept: RADIOLOGY | Facility: CLINIC | Age: 73
End: 2023-12-13
Payer: MEDICARE

## 2023-12-13 ENCOUNTER — OFFICE VISIT (OUTPATIENT)
Dept: ORTHOPEDIC SURGERY | Facility: CLINIC | Age: 73
End: 2023-12-13
Payer: MEDICARE

## 2023-12-13 VITALS — WEIGHT: 189 LBS | HEIGHT: 66 IN | BODY MASS INDEX: 30.37 KG/M2

## 2023-12-13 DIAGNOSIS — S72.90XA CLOSED FRACTURE OF FEMUR, UNSPECIFIED FRACTURE MORPHOLOGY, UNSPECIFIED LATERALITY, UNSPECIFIED PORTION OF FEMUR, INITIAL ENCOUNTER (MULTI): ICD-10-CM

## 2023-12-13 DIAGNOSIS — D48.0 GIANT CELL TUMOR OF BONE: Primary | ICD-10-CM

## 2023-12-13 PROCEDURE — 1090000001 HH PPS REVENUE CREDIT

## 2023-12-13 PROCEDURE — 73560 X-RAY EXAM OF KNEE 1 OR 2: CPT | Mod: LT

## 2023-12-13 PROCEDURE — 73560 X-RAY EXAM OF KNEE 1 OR 2: CPT | Mod: LEFT SIDE | Performed by: RADIOLOGY

## 2023-12-13 PROCEDURE — 1090000002 HH PPS REVENUE DEBIT

## 2023-12-13 PROCEDURE — 1036F TOBACCO NON-USER: CPT | Performed by: STUDENT IN AN ORGANIZED HEALTH CARE EDUCATION/TRAINING PROGRAM

## 2023-12-13 PROCEDURE — 1159F MED LIST DOCD IN RCRD: CPT | Performed by: STUDENT IN AN ORGANIZED HEALTH CARE EDUCATION/TRAINING PROGRAM

## 2023-12-13 PROCEDURE — 1111F DSCHRG MED/CURRENT MED MERGE: CPT | Performed by: STUDENT IN AN ORGANIZED HEALTH CARE EDUCATION/TRAINING PROGRAM

## 2023-12-13 PROCEDURE — 99024 POSTOP FOLLOW-UP VISIT: CPT | Performed by: STUDENT IN AN ORGANIZED HEALTH CARE EDUCATION/TRAINING PROGRAM

## 2023-12-13 PROCEDURE — 1125F AMNT PAIN NOTED PAIN PRSNT: CPT | Performed by: STUDENT IN AN ORGANIZED HEALTH CARE EDUCATION/TRAINING PROGRAM

## 2023-12-13 PROCEDURE — 1160F RVW MEDS BY RX/DR IN RCRD: CPT | Performed by: STUDENT IN AN ORGANIZED HEALTH CARE EDUCATION/TRAINING PROGRAM

## 2023-12-13 ASSESSMENT — ENCOUNTER SYMPTOMS
DEPRESSION: 0
OCCASIONAL FEELINGS OF UNSTEADINESS: 0
LOSS OF SENSATION IN FEET: 0

## 2023-12-13 ASSESSMENT — PAIN - FUNCTIONAL ASSESSMENT: PAIN_FUNCTIONAL_ASSESSMENT: NO/DENIES PAIN

## 2023-12-13 NOTE — PROGRESS NOTES
Orthopaedic Surgery Clinic Progress Note:    CC: Left distal femur giant cell tumor bone pathologic facture    S: 73 y.o. female with a history of a pathologic fracture through a giant cell tumor of bone of the left distal femur.  She is now approximate 4 weeks out from her surgery which included a biopsy, curettage as well as plate fixation.  She is primarily Vincentian-speaking accompanied by her children.  She has been maintaining her nonweightbearing restrictions.  She is finishing up her aspirin DVT prophylaxis.  Pain is well-controlled.  She is been working on range of motion with PT at home.    Objective:    Exam:  Gen: alert, appropriately conversational, no apparent distress  Chest: symmetric chest rise, non-labored breathing  Heart: RRR to peripheral palpation    Physical exam of the left lower extremity shows healed surgical incisions with no evidence of erythema or drainage.  She lacks 3 to 5 degrees of terminal extension and can flex to approximately 10 degrees.  Sensation is intact light touch distally in all distributions and she has palpable pulses and brisk capillary refill distally.  EHL, dorsiflexion and plantarflexion are intact.    Imaging:  XR of the left knee, obtained and personally reviewed today, shows stable orthopedic fixation of her left distal femur with good position of plate and no evidence of hardware failure.  She has no evidence of lysis or lucency around her cement mantle to suggest recurrence..    A/P:    Staples were removed and Steri-Strips placed.  We continue to discuss the importance of range of motion including terminal extension.  I would like for her to follow-up with me in the first week of February which will be about 3 months from the time of her surgery.  At that time we will get new left knee x-rays and hopefully we can then progress her weightbearing.  Afterwards we will see her back 3 months after that to get a new chest x-ray as well as new local imaging and follow  her on a 6-month interval for the first 2 years before moving to annual visits.

## 2023-12-14 ENCOUNTER — HOME CARE VISIT (OUTPATIENT)
Dept: HOME HEALTH SERVICES | Facility: HOME HEALTH | Age: 73
End: 2023-12-14
Payer: MEDICARE

## 2023-12-14 PROCEDURE — 1090000001 HH PPS REVENUE CREDIT

## 2023-12-14 PROCEDURE — 1090000002 HH PPS REVENUE DEBIT

## 2023-12-15 PROCEDURE — 1090000001 HH PPS REVENUE CREDIT

## 2023-12-15 PROCEDURE — 1090000002 HH PPS REVENUE DEBIT

## 2023-12-16 PROCEDURE — 1090000002 HH PPS REVENUE DEBIT

## 2023-12-16 PROCEDURE — 1090000001 HH PPS REVENUE CREDIT

## 2023-12-17 PROCEDURE — 1090000001 HH PPS REVENUE CREDIT

## 2023-12-17 PROCEDURE — 1090000002 HH PPS REVENUE DEBIT

## 2023-12-18 PROCEDURE — 1090000001 HH PPS REVENUE CREDIT

## 2023-12-18 PROCEDURE — 1090000002 HH PPS REVENUE DEBIT

## 2023-12-19 ENCOUNTER — HOME CARE VISIT (OUTPATIENT)
Dept: HOME HEALTH SERVICES | Facility: HOME HEALTH | Age: 73
End: 2023-12-19
Payer: MEDICARE

## 2023-12-19 PROCEDURE — 1090000001 HH PPS REVENUE CREDIT

## 2023-12-19 PROCEDURE — 1090000002 HH PPS REVENUE DEBIT

## 2023-12-19 ASSESSMENT — ACTIVITIES OF DAILY LIVING (ADL)
HOME_HEALTH_OASIS: 00
OASIS_M1830: 04

## 2023-12-19 ASSESSMENT — PATIENT HEALTH QUESTIONNAIRE - PHQ9: PATIENT UNABLE TO COMPLETE PHQ: NON-VISIT DISCHARGE

## 2023-12-19 NOTE — Clinical Note
Spoke with pt's daughter Liz who states that pt is doing much better, and had a follow up with the surgeon last week. The surgeon stated that pt will have to remain non-weight-bearing until at least February and that continuing with PT is up to pt for now. Pt and Liz do not feel that pt needs to continue with PT at this time and did not feel a need for a visit for discharge. Pt is getting in and out of bed on her own, transferring on her own, and using a power wheelchair for in home mobility on her own. She is comfortable with HEP. They know to seek a referral to either resume home health PT or go to outpatient PT once pt is cleared to progress weight bearing activities.

## 2023-12-26 ENCOUNTER — APPOINTMENT (OUTPATIENT)
Dept: RADIOLOGY | Facility: CLINIC | Age: 73
End: 2023-12-26
Payer: MEDICARE

## 2023-12-27 DIAGNOSIS — M54.16 LUMBAR RADICULOPATHY: Primary | ICD-10-CM

## 2024-01-31 ENCOUNTER — TELEPHONE (OUTPATIENT)
Dept: DERMATOLOGY | Facility: CLINIC | Age: 74
End: 2024-01-31
Payer: MEDICARE

## 2024-01-31 DIAGNOSIS — L40.0 PLAQUE PSORIASIS: ICD-10-CM

## 2024-01-31 RX ORDER — FLUOCINOLONE ACETONIDE 0.11 MG/ML
OIL TOPICAL
Qty: 118 ML | Refills: 3 | Status: SHIPPED | OUTPATIENT
Start: 2024-01-31

## 2024-01-31 RX ORDER — KETOCONAZOLE 20 MG/ML
SHAMPOO, SUSPENSION TOPICAL
Qty: 120 ML | Refills: 11 | Status: SHIPPED | OUTPATIENT
Start: 2024-01-31

## 2024-01-31 NOTE — TELEPHONE ENCOUNTER
From: Liz Morales   Sent: 2024 10:12 AM  To: Taj Watts <[email or web address]  Subject: Refill Request  Importance: High    Good Morning Dr. Watts,    Hope you are doing well!    I wanted to reach out and ask for your opinion/suggestion:    My mother saw you back in  for psoriasis and you had prescribed to her the Fluocinolone .01% Scalp oil and the Ketoconazole 2% shampoo. She had also seen the ID specialist to handle the laten TB results which she did and her psoriasis was gone.    My mom fell in November and had major knee surgery and is non-weight bearing until at least  when she has a follow up with Dr. Héctor Gardner (Ortho-Onc).    She has recently had some psoriasis spots which she confirms are stress related since being side-lined with this injury. Is she able to have a refill for the oil and shampoo or would you like to maybe do a virtual appointment with her first? Coming to the office is too difficult at this time because she has to hop with the walker and she uses a little wheelchair in the house to get around. But I am more than able to connect you with her, whatever you say J    Please let me know what is best!    Patient: Blanquita Morales  : 1950  My phone number: [phone]    Thank you always!!     Thank you,  Rafael Leyva  Senior GME   Pulmonary Critical Care and Sleep Medicine  [address] [address]  P: [phone]  F: [phone]  E: [email or web address]

## 2024-02-07 ENCOUNTER — OFFICE VISIT (OUTPATIENT)
Dept: ORTHOPEDIC SURGERY | Facility: CLINIC | Age: 74
End: 2024-02-07
Payer: MEDICARE

## 2024-02-07 ENCOUNTER — HOSPITAL ENCOUNTER (OUTPATIENT)
Dept: RADIOLOGY | Facility: CLINIC | Age: 74
Discharge: HOME | End: 2024-02-07
Payer: MEDICARE

## 2024-02-07 VITALS — WEIGHT: 189 LBS | BODY MASS INDEX: 30.37 KG/M2 | HEIGHT: 66 IN

## 2024-02-07 DIAGNOSIS — D48.0 GIANT CELL TUMOR OF BONE: ICD-10-CM

## 2024-02-07 PROCEDURE — 99214 OFFICE O/P EST MOD 30 MIN: CPT | Performed by: STUDENT IN AN ORGANIZED HEALTH CARE EDUCATION/TRAINING PROGRAM

## 2024-02-07 PROCEDURE — 1036F TOBACCO NON-USER: CPT | Performed by: STUDENT IN AN ORGANIZED HEALTH CARE EDUCATION/TRAINING PROGRAM

## 2024-02-07 PROCEDURE — 1160F RVW MEDS BY RX/DR IN RCRD: CPT | Performed by: STUDENT IN AN ORGANIZED HEALTH CARE EDUCATION/TRAINING PROGRAM

## 2024-02-07 PROCEDURE — 1159F MED LIST DOCD IN RCRD: CPT | Performed by: STUDENT IN AN ORGANIZED HEALTH CARE EDUCATION/TRAINING PROGRAM

## 2024-02-07 PROCEDURE — 1125F AMNT PAIN NOTED PAIN PRSNT: CPT | Performed by: STUDENT IN AN ORGANIZED HEALTH CARE EDUCATION/TRAINING PROGRAM

## 2024-02-07 PROCEDURE — 73560 X-RAY EXAM OF KNEE 1 OR 2: CPT | Mod: LT

## 2024-02-07 PROCEDURE — 73560 X-RAY EXAM OF KNEE 1 OR 2: CPT | Mod: LEFT SIDE | Performed by: RADIOLOGY

## 2024-02-07 ASSESSMENT — PAIN - FUNCTIONAL ASSESSMENT: PAIN_FUNCTIONAL_ASSESSMENT: NO/DENIES PAIN

## 2024-02-07 ASSESSMENT — ENCOUNTER SYMPTOMS
LOSS OF SENSATION IN FEET: 0
DEPRESSION: 0
OCCASIONAL FEELINGS OF UNSTEADINESS: 1

## 2024-02-07 NOTE — PROGRESS NOTES
Orthopaedic Surgery Clinic Progress Note:    CC: Follow-up giant cell tumor bone, pathologic fracture left distal femur    S: 73 y.o. 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  She was treated with open biopsy, curettage as well as plate fixation in addition to cement in November 2023.  She is now 3 months status post her surgery which has been maintaining her nonweightbearing restrictions but performing range of motion.  She denies any falls or trauma.  Denies numbness or tingling.  She is otherwise doing fairly well.  She is accompanied by her daughter who primarily chooses to function a .    Objective:    Exam:  Gen: alert, appropriately conversational, no apparent distress  Chest: symmetric chest rise, non-labored breathing  Heart: RRR to peripheral palpation    Physical exam of the left lower extremity shows healed surgical incisions with no evidence of erythema or drainage. She lacks 3 degrees of terminal extension and can flex to approximately 110 degrees. Sensation is intact light touch distally in all distributions and she has palpable pulses and brisk capillary refill distally. EHL, dorsiflexion and plantarflexion are intact.     Imaging:  Radiographs obtained the left knee were obtained today and individually reviewed by myself which show stable orthopedic fixation as well as good cement placement.  There is no evidence of lucency around the cement mantle to suggest local recurrence.  There is continued evidence of fracture healing without any change in alignment.    A/P:    All questions were answered and at this time I would like the patient to return to see us in 3 months with repeat left knee films.  Working to get her a physical therapy referral to start working on strengthening of the left leg.  When she returns we will not only check for local recurrence with repeat x-ray of the left knee but we will also get a chest x-ray.  As  long as everything is looking appropriate at that time we will then continue surveillance on a 6-month interval for the first 2 years.  She will see us back in 2 months with the above-mentioned x-rays.

## 2024-03-12 ENCOUNTER — EVALUATION (OUTPATIENT)
Dept: PHYSICAL THERAPY | Facility: CLINIC | Age: 74
End: 2024-03-12
Payer: MEDICARE

## 2024-03-12 DIAGNOSIS — D48.0 GIANT CELL TUMOR OF BONE: Primary | ICD-10-CM

## 2024-03-12 DIAGNOSIS — M62.81 MUSCLE WEAKNESS ON EXAMINATION: ICD-10-CM

## 2024-03-12 DIAGNOSIS — R26.2 DIFFICULTY WALKING: ICD-10-CM

## 2024-03-12 PROCEDURE — 97535 SELF CARE MNGMENT TRAINING: CPT | Mod: GP

## 2024-03-12 PROCEDURE — 97162 PT EVAL MOD COMPLEX 30 MIN: CPT | Mod: GP

## 2024-03-12 ASSESSMENT — ENCOUNTER SYMPTOMS
LOSS OF SENSATION IN FEET: 0
DEPRESSION: 0
OCCASIONAL FEELINGS OF UNSTEADINESS: 1

## 2024-03-12 NOTE — LETTER
March 12, 2024    Ifrah Valladares, PT  [address] [address]    Patient: Blanquita Morales   YOB: 1950   Date of Visit: 3/12/2024       Dear Wilmer Gardner MD  [id number]  [place] [address]    The attached plan of care is being sent to you because your patient’s medical reimbursement requires that you certify the plan of care. Your signature is required to allow uninterrupted insurance coverage.      You may indicate your approval by signing below and faxing this form back to us at Dept Fax: [phone].    Please call Dept: [phone] with any questions or concerns.    Thank you for this referral,        Ifrah Valladares, PT  [address] [address]    Payer: Payor: [employer] / Plan: [facility] MEDICARE ADVANTAGE / Product Type: *No Product type* /                                                                         Date:     Dear Ifrah Valladares, PT,     Re: Ms. Blanquita Morales, MRN:[id number]    I certify that I have reviewed the attached plan of care and it is medically necessary for Ms. Blanquita Morales (1950) who is under my care.          ______________________________________                    _________________  Provider name and credentials                                           Date and time                                                                                           Plan of Care 3/12/24   Effective from: 3/12/2024  Effective to: 6/10/2024    Plan ID: [id number]            Participants as of Finalize on 3/12/2024    Name Type Comments Contact Info    Wilmer Gardner MD Consulting Physician  [phone]    Ifrah Valladares, PT Physical Therapist  [phone]       Last Plan Note     Author: Ifrah Valladares PT Status: Incomplete Last edited: 3/12/2024  9:30 AM         Physical Therapy  Physical Therapy  Orthopedic Evaluation    Patient Name: Blanquita Morales  MRN: [id number]  Today's Date: 3/12/2024  Time Calculation  Start Time: 0930  Stop Time: 1015  Time Calculation (min): 45 min  PT Evaluation Time Entry  PT Evaluation (Moderate) Time Entry: 30 PT Therapeutic Procedures Time Entry  Self-Care/Home Mgmt Training: 15          Insurance:  Visit number: 1 of ?  Authorization info: needed after eval  Insurance Type: Payor: [employer] MEDICARE / Plan: [employer] MEDICARE ADVANTAGE / Product Type: *No Product type* /    Certification dates: 3/12/24 to 6/12/24  CPT Codes: 23640 TE, 33299 MT, 68438 NM-ROGELIO, 22952 GAIT, 63784 STIM, 85286 SELF CARE    Current Problem  1. Giant cell tumor of bone  PT eval and treat    Follow Up In Physical Therapy      2. Muscle weakness on examination  Follow Up In Physical Therapy      3. Difficulty walking  Follow Up In Physical Therapy          Referred by: Dr. Wilmer Gardner    General:     Left leg pain/weakness secondary to Giant cell tumor of bone and ORIF  Precautions:  Precautions  STEADI Fall Risk Score (The score of 4 or more indicates an increased risk of falling): 7  WBAT LLE, ROM as tolerated  Medical History Form: Reviewed (scanned into chart)    Subjective:     Chief Complaint: pt is a 72 yo female who is known in our clinic.  Patient  was being treated for her left knee pain that started back in October.  She was initially treated for OA/meniscal derangement and received a cortisone injection which did not not help.  She feel in her home on 11-15-23 and suffered a fracture of her left femur.  At that time they discovered a giant cell tumor of the bone in her left knee.  She was transferred down to [facility] and on 11-16-23 she underwent ORIF of the distal femur and biopsy of the bone  Onset: 11-16-24  OLIVER: fall that led to the discovery of the giant cell tumor    She was then NWB for 3 months as she healed from the biopsy and the ORIF.  She used a walker in the home and a wheelchair  for her mobility.  She is now cleared for WBAT and ROM as tolerated for her left knee.  She reports that she is fearful of falling on the left LE.  She has not been out in the community much since the accident.      Home:  same as prior to the surgery, English is second language, Daughter is her primary  and assists with her care-giving. Prior to the start of her knee pain she was fully independent with her ambulation and driving. She has not driven as of yet.  She lives on the first floor in the home but will go up and down the stairs for the laundry.  4 steps with a rail into the home.  She is using her walker all the time right now.          Pain:     Location: stiffness/achiness of the knee, minimal pain more tightness  Description: anterior/lateral joint line   Aggravating Factors:  Standing, Walking, Sitting, Squatting, and Stairs, walking tolerance max 5-10 min, standing tolerance 20 minutes  Tried to go shopping the other day and was laid up for 1 week after  Relieving Factors:  Rest    Relevant Information (PMH & Previous Tests/Imaging): 2/7/24:  FINDINGS:  Status post curettage of the distal femur with bone cement placement  and lateral plate and screw fixation. Hardware is intact without  perihardware fractures or lucencies. Joint spaces of the knee are  maintained. Unremarkable soft tissues..  No acute fracture or malalignment.  Previous Interventions/Treatments: Physical Therapy    Prior Level of Function (PLOF)  Patient previously independent with all ADLs  Exercise/Physical Activity: has foot pedals in the home, helping with the grand kids  Work/School: worked previously at the [employer], but not looking to get back there right now  Driving:  has not driven yet    Patients Living Environment: Reviewed and no concern  Primary Language: English second language, Martiniquais primary language  Patient's Goal(s) for Therapy: walk with least restrictive device, drive, safely ambulate in the home and the  community    Red Flags: Do you have any of the following? No  Fever/chills, unexplained weight changes, dizziness/fainting, unexplained change in bowel or bladder functions, unexplained malaise or muscle weakness, night pain/sweats, numbness or tingling    Objective:  Objective  Right LE:  WFL flexion and extension   Knee flexion:  100 degrees AROM seated in a chair, mild stiffness into resitriction  Active knee ext:  0: 10 seated in chair, passively able to get close to neutral, mild restriction felt    Right  4/5 in hip flexor, hip abd: 4-/5, knee flex: 5/5, knee ext 5/5     Left:  hip flex 3+/5, hip abd:  3+/5, hip add 4-/5, knee flexion/knee ext:  3+/5          Posture: rounded posture,     Lower Extremity Functional Movements  Transfers: will use the UE for most of the movement for sit to stand transition, right LE doing most of the work  Gait: mild antalgic gait, decrease in active swing for the left LE, decrease in reciprocal swing gait pattern left compared to right, decrease in right step length, will use moderate weight through UE for support   SL Balance: unable to test right now    No pain to palpation over the left anterior thigh. Incisions are well healed, reports able to feel soft touch on the left, mild tissue tightness around the distal femur    TU secs      Outcome Measures:  PSFS:   Ambulation 2/10  Stair climbing 2/10  Shopping 1/10  5/30    TU secs  Treatments:  Review of safety in the home, balance/fall prevention  Education on heel slides, weight shifts at the counter, gentle active knee extensions   Self care education:  safety in the home, footwear, positions to avoid, active stretching program to help with stiffness, rollator vs wheeled walker discussion     EDUCATION: Home exercise program, plan of care, activity modifications, pain management, and injury pathology       Assessment: Patient presents with signs and symptoms consistent with left leg stiffness secondary to  ORIF/left leg biopsy of giant cell tumor of the bone, resulting in limited participation in pain-free ADLs and inability to perform at their prior level of function. Pt would benefit from physical therapy to address the impairments found & listed previously in the objective section in order to return to safe and pain-free ADLs and prior level of function.     Problem list:  decrease in walking tolerance, standing tolerance, changes in gait, decrease in LE strength, changes in balance, increase in effort for all loaded activity    Plan:     Planned Interventions include: therapeutic exercise, self-care home management, manual therapy, therapeutic activities, gait training, neuromuscular coordination, vasopneumatic, dry needling, aquatic therapy  Frequency: 1 x Week  Duration: 12 Weeks  Goals: Set and discussed today    Short term goals 6 weeks   Patient  will be able to stand for > 20 minutes and assist folding laundry and putting away dishes.  Patient  will be able to walk for > 5 minutes at one effort with least restrictive device  Patient  will be able to improve left functional leg strength to perform 10 reps of 6 in step ups with light hands.    Long Term goal:   Patient  will be able to successfully perform sit to stand from her the drivers side independently.  Patient  will be able to ambulate for > 20 minutes with a shopping cart to allow her to community shop.   Patient  will improve TUG time to < 14 secs to improve dynamic balance and to help decrease fall risk.  Patient  will be able to demonstrate normalized gait pattern with least restrictive device.   Patient  will report full independence with HEP.       Plan of care was developed with input and agreement by the patient      Ifrah Valladares PT         Current Participants as of 3/12/2024    Name Type Comments Contact Info    Wilmer Gardner MD Consulting Physician  [phone]    Signature pending    Ifrah Valladares PT Physical Therapist   [phone]    Signature pending

## 2024-03-12 NOTE — PROGRESS NOTES
Physical Therapy  Physical Therapy Orthopedic Evaluation    Patient Name: Blanquita Morales  MRN: [id number]  Today's Date: 3/12/2024  Time Calculation  Start Time: 0930  Stop Time: 1015  Time Calculation (min): 45 min  PT Evaluation Time Entry  PT Evaluation (Moderate) Time Entry: 30 PT Therapeutic Procedures Time Entry  Self-Care/Home Mgmt Training: 15          Insurance:  Visit number: 1 of ?  Authorization info: needed after eval  Insurance Type: Payor: [employer]IC[employer] / Plan: [employer] MEDICARE ADVANTAGE / Product Type: *No Product type* /    Certification dates: 3/12/24 to 6/12/24  CPT Codes: 18953 TE, 70631 MT, 37437 NM-ROGELIO, 02704 GAIT, 44209 STIM, 00985 SELF CARE    Current Problem  1. Giant cell tumor of bone  PT eval and treat    Follow Up In Physical Therapy      2. Muscle weakness on examination  Follow Up In Physical Therapy      3. Difficulty walking  Follow Up In Physical Therapy          Referred by: Dr. Wilmer Gardner    General:     Left leg pain/weakness secondary to Giant cell tumor of bone and ORIF  Precautions:  Precautions  STEADI Fall Risk Score (The score of 4 or more indicates an increased risk of falling): 7  WBAT LLE, ROM as tolerated  Medical History Form: Reviewed (scanned into chart)    Subjective:     Chief Complaint: pt is a 72 yo female who is known in our clinic.  Patient  was being treated for her left knee pain that started back in October.  She was initially treated for OA/meniscal derangement and received a cortisone injection which did not not help.  She feel in her home on 11-15-23 and suffered a fracture of her left femur.  At that time they discovered a giant cell tumor of the bone in her left knee.  She was transferred down to [facility] and on 11-16-23 she underwent ORIF of the distal femur and biopsy of the bone  Onset: 11-16-24  OLIVER: fall that led to the discovery of the giant cell tumor    She was then NWB for 3 months as she healed from the biopsy and the ORIF.  She used  a walker in the home and a wheelchair for her mobility.  She is now cleared for WBAT and ROM as tolerated for her left knee.  She reports that she is fearful of falling on the left LE.  She has not been out in the community much since the accident.      Home:  same as prior to the surgery, English is second language, Daughter is her primary  and assists with her care-giving. Prior to the start of her knee pain she was fully independent with her ambulation and driving. She has not driven as of yet.  She lives on the first floor in the home but will go up and down the stairs for the laundry.  4 steps with a rail into the home.  She is using her walker all the time right now.          Pain:     Location: stiffness/achiness of the knee, minimal pain more tightness  Description: anterior/lateral joint line   Aggravating Factors:  Standing, Walking, Sitting, Squatting, and Stairs, walking tolerance max 5-10 min, standing tolerance 20 minutes  Tried to go shopping the other day and was laid up for 1 week after  Relieving Factors:  Rest    Relevant Information (PMH & Previous Tests/Imaging): 2/7/24:  FINDINGS:  Status post curettage of the distal femur with bone cement placement  and lateral plate and screw fixation. Hardware is intact without  perihardware fractures or lucencies. Joint spaces of the knee are  maintained. Unremarkable soft tissues..  No acute fracture or malalignment.  Previous Interventions/Treatments: Physical Therapy    Prior Level of Function (PLOF)  Patient previously independent with all ADLs  Exercise/Physical Activity: has foot pedals in the home, helping with the grand kids  Work/School: worked previously at the [employer], but not looking to get back there right now  Driving:  has not driven yet    Patients Living Environment: Reviewed and no concern  Primary Language: English second language, Emirati primary language  Patient's Goal(s) for Therapy: walk with least restrictive device, drive,  safely ambulate in the home and the community    Red Flags: Do you have any of the following? No  Fever/chills, unexplained weight changes, dizziness/fainting, unexplained change in bowel or bladder functions, unexplained malaise or muscle weakness, night pain/sweats, numbness or tingling    Objective:  Objective   Right LE:  WFL flexion and extension   Knee flexion:  100 degrees AROM seated in a chair, mild stiffness into resitriction  Active knee ext:  0: 10 seated in chair, passively able to get close to neutral, mild restriction felt    Right  4/5 in hip flexor, hip abd: 4-/5, knee flex: 5/5, knee ext 5/5     Left:  hip flex 3+/5, hip abd:  3+/5, hip add 4-/5, knee flexion/knee ext:  3+/5          Posture: rounded posture,     Lower Extremity Functional Movements  Transfers: will use the UE for most of the movement for sit to stand transition, right LE doing most of the work  Gait: mild antalgic gait, decrease in active swing for the left LE, decrease in reciprocal swing gait pattern left compared to right, decrease in right step length, will use moderate weight through UE for support   SL Balance: unable to test right now    No pain to palpation over the left anterior thigh. Incisions are well healed, reports able to feel soft touch on the left, mild tissue tightness around the distal femur    TU secs      Outcome Measures:  PSFS:   Ambulation 2/10  Stair climbing 2/10  Shopping 1/10  5/30    TU secs  Treatments:  Review of safety in the home, balance/fall prevention  Education on heel slides, weight shifts at the counter, gentle active knee extensions   Self care education:  safety in the home, footwear, positions to avoid, active stretching program to help with stiffness, rollator vs wheeled walker discussion     EDUCATION: Home exercise program, plan of care, activity modifications, pain management, and injury pathology       Assessment: Patient presents with signs and symptoms consistent with  left leg stiffness secondary to ORIF/left leg biopsy of giant cell tumor of the bone, resulting in limited participation in pain-free ADLs and inability to perform at their prior level of function. Pt would benefit from physical therapy to address the impairments found & listed previously in the objective section in order to return to safe and pain-free ADLs and prior level of function.     Problem list:  decrease in walking tolerance, standing tolerance, changes in gait, decrease in LE strength, changes in balance, increase in effort for all loaded activity    Plan:     Planned Interventions include: therapeutic exercise, self-care home management, manual therapy, therapeutic activities, gait training, neuromuscular coordination, vasopneumatic, dry needling, aquatic therapy  Frequency: 1 x Week  Duration: 12 Weeks  Goals: Set and discussed today    Short term goals 6 weeks   Patient  will be able to stand for > 20 minutes and assist folding laundry and putting away dishes.  Patient  will be able to walk for > 5 minutes at one effort with least restrictive device  Patient  will be able to improve left functional leg strength to perform 10 reps of 6 in step ups with light hands.    Long Term goal:   Patient  will be able to successfully perform sit to stand from her the drivers side independently.  Patient  will be able to ambulate for > 20 minutes with a shopping cart to allow her to community shop.   Patient  will improve TUG time to < 14 secs to improve dynamic balance and to help decrease fall risk.  Patient  will be able to demonstrate normalized gait pattern with least restrictive device.   Patient  will report full independence with HEP.       Plan of care was developed with input and agreement by the patient      Ifrah Valladares, PT

## 2024-03-14 NOTE — PROGRESS NOTES
Physical Therapy  Physical Therapy Treatment Note    Patient Name: Blanquita Morales  MRN: [id number]  Today's Date: 3/19/2024  Time Calculation  Start Time: 1040  Stop Time: 1130  Time Calculation (min): 50 min    PT Therapeutic Procedures Time Entry  Therapeutic Exercise Time Entry: 45            Insurance:  Visit number: 2 of 10  Authorization info: auth through 6-9-24  Certification dates:   3/12/24 to 6/12/24     Current Problem  1. Giant cell tumor of bone  Follow Up In Physical Therapy      2. Muscle weakness on examination  Follow Up In Physical Therapy      3. Difficulty walking  Follow Up In Physical Therapy            Precautions     Precautions  STEADI Fall Risk Score (The score of 4 or more indicates an increased risk of falling): 7  WBAT LLE, ROM as tolerated    Subjective:     Patient reports that she is doing well. She was able to stand and cook over the weekend. Pain is all or nothing as she describes    Pain     No new pain   Objective:         Treatments:   - review of symptoms  - seated slides for the left foot  - review of HEP   - Bike x 6 min for ROM  - quad set into towel hold 6 secs x 20 reps  - quad set with SLR x 12 reps  - heel prop x 3 min   - gentle STM over the lateral thigh x 5 min   - SAQ x 12 reps   - modified bridge with legs over the bolster  - Long Arc quad x 4 secs x 12 reps   - walking in clinic       Assessment:       Pt was able to tolerate the session well.  She had no increase in irritability.  Pt had improvement in gait jessie and step through gait pattern   Plan:      Continue to focus on strength, balance, and gait training   Goals:   Short term goals 6 weeks   Patient  will be able to stand for > 20 minutes and assist folding laundry and putting away dishes.  Patient  will be able to walk for > 5 minutes at one effort with least restrictive device  Patient  will be able to improve left functional leg strength to perform 10 reps of 6 in step ups with light hands.     Long  Term goal:   Patient  will be able to successfully perform sit to stand from her the drivers side independently.  Patient  will be able to ambulate for > 20 minutes with a shopping cart to allow her to community shop.   Patient  will improve TUG time to < 14 secs to improve dynamic balance and to help decrease fall risk.  Patient  will be able to demonstrate normalized gait pattern with least restrictive device.   Patient  will report full independence with HEP.       Ifrah Valladares, PT

## 2024-03-19 ENCOUNTER — TREATMENT (OUTPATIENT)
Dept: PHYSICAL THERAPY | Facility: CLINIC | Age: 74
End: 2024-03-19
Payer: MEDICARE

## 2024-03-19 DIAGNOSIS — D48.0 GIANT CELL TUMOR OF BONE: ICD-10-CM

## 2024-03-19 DIAGNOSIS — M62.81 MUSCLE WEAKNESS ON EXAMINATION: ICD-10-CM

## 2024-03-19 DIAGNOSIS — R26.2 DIFFICULTY WALKING: ICD-10-CM

## 2024-03-19 PROCEDURE — 97110 THERAPEUTIC EXERCISES: CPT | Mod: GP

## 2024-03-26 ENCOUNTER — TREATMENT (OUTPATIENT)
Dept: PHYSICAL THERAPY | Facility: CLINIC | Age: 74
End: 2024-03-26
Payer: MEDICARE

## 2024-03-26 DIAGNOSIS — R26.2 DIFFICULTY WALKING: ICD-10-CM

## 2024-03-26 DIAGNOSIS — M62.81 MUSCLE WEAKNESS ON EXAMINATION: ICD-10-CM

## 2024-03-26 DIAGNOSIS — D48.0 GIANT CELL TUMOR OF BONE: ICD-10-CM

## 2024-03-26 PROCEDURE — 97112 NEUROMUSCULAR REEDUCATION: CPT | Mod: GP

## 2024-03-26 NOTE — PROGRESS NOTES
Physical Therapy  Physical Therapy Treatment Note    Patient Name: Blanquita Morales  MRN: [id number]  Today's Date: 3/26/2024  Time Calculation  Start Time: 1015  Stop Time: 1100  Time Calculation (min): 45 min    PT Therapeutic Procedures Time Entry  Neuromuscular Re-Education Time Entry: 10  Therapeutic Exercise Time Entry: 35            Insurance:  Visit number: 3 of 10  Authorization info: auth through 6-9-24  Certification dates:   3/12/24 to 6/12/24     Current Problem  1. Giant cell tumor of bone  Follow Up In Physical Therapy      2. Muscle weakness on examination  Follow Up In Physical Therapy      3. Difficulty walking  Follow Up In Physical Therapy              Precautions     Precautions  STEADI Fall Risk Score (The score of 4 or more indicates an increased risk of falling): 7  WBAT LLE, ROM as tolerated    Subjective:         Pain     No new pain   Objective:         Treatments:     - Bike x 6 min for ROM  - seated knee ext hold 3 min with foot on stool  - walking in parallel bars x forward and backward x multiple times  - 2 hand support stepping over the right and left foot first parallel bars  - side stepping   - sit to stand with airex x 12 reps with emphasis on weight shift over the left   -LAQ x 2 lbs each side  - hamstring curl BTB x 12 reps left side  - hip abd x band x 12 reps   - hip add with ball x 6 secs x 12 reps   - review of HEP and guidelines       TE x 2   Neuro x 1      Assessment:       Pt was able tolerate the session well.  She had  no increase in Irritability  or reactivity after the session.  Will see how she tolerates the session today  Plan:      Continue to focus on strength, balance, and gait training   Goals:   Short term goals 6 weeks   Patient  will be able to stand for > 20 minutes and assist folding laundry and putting away dishes.  Patient  will be able to walk for > 5 minutes at one effort with least restrictive device  Patient  will be able to improve left functional leg  strength to perform 10 reps of 6 in step ups with light hands.     Long Term goal:   Patient  will be able to successfully perform sit to stand from her the drivers side independently.  Patient  will be able to ambulate for > 20 minutes with a shopping cart to allow her to community shop.   Patient  will improve TUG time to < 14 secs to improve dynamic balance and to help decrease fall risk.  Patient  will be able to demonstrate normalized gait pattern with least restrictive device.   Patient  will report full independence with HEP.       Ifrah Valladares, PT

## 2024-04-02 ENCOUNTER — TREATMENT (OUTPATIENT)
Dept: PHYSICAL THERAPY | Facility: CLINIC | Age: 74
End: 2024-04-02
Payer: MEDICARE

## 2024-04-02 DIAGNOSIS — R26.2 DIFFICULTY WALKING: ICD-10-CM

## 2024-04-02 DIAGNOSIS — M62.81 MUSCLE WEAKNESS ON EXAMINATION: ICD-10-CM

## 2024-04-02 DIAGNOSIS — D48.0 GIANT CELL TUMOR OF BONE: ICD-10-CM

## 2024-04-02 PROCEDURE — 97110 THERAPEUTIC EXERCISES: CPT | Mod: GP

## 2024-04-02 NOTE — PROGRESS NOTES
Physical Therapy  Physical Therapy Treatment Note    Patient Name: Blanquita Morales  MRN: [id number]  Today's Date: 4/2/2024  Time Calculation  Start Time: 1110  Stop Time: 1200  Time Calculation (min): 50 min    PT Therapeutic Procedures Time Entry  Therapeutic Exercise Time Entry: 45            Insurance:  Visit number: 4 of 10  Authorization info: auth through 6-9-24  Certification dates:   3/12/24 to 6/12/24     Current Problem  1. Giant cell tumor of bone  Follow Up In Physical Therapy      2. Muscle weakness on examination  Follow Up In Physical Therapy      3. Difficulty walking  Follow Up In Physical Therapy              Precautions     Precautions  STEADI Fall Risk Score (The score of 4 or more indicates an increased risk of falling): 7  WBAT LLE, ROM as tolerated    Subjective:   Pt reports that she is feeling good. She has some tightness in the knee after sitting for extended periods of time    Using a quad cane 0      Pain     No new pain   Objective:   3 laps around the 404 ft at one effort       Treatments:     - Bike x 6 min for ROM  - seated knee slide x 2 min on the left side  - sit to stand hands on chair , no pad x 12 reps   - discussion on progression sit to stand  - standing heel raise x 12  - 3 laps with the rollator  - wall slide and hold 3 secs x 10 reps  - seated hamstring stretch  - supine scar STM x hands on 5 min   - SAQ x 2.5 lbs x 12 reps  - review of HEP and guidelines       TE 3      Assessment:       Pt was able tolerate the session well.  She had  no increase in Irritability  or reactivity after the session.  She demonstrates improvement in gait pattern with the rollator in turns of jessie, active swing, and stance time on the left  Plan:      Continue to focus on strength, balance, and gait training   Goals:   Short term goals 6 weeks   Patient  will be able to stand for > 20 minutes and assist folding laundry and putting away dishes.  Patient  will be able to walk for > 5 minutes  at one effort with least restrictive device  Patient  will be able to improve left functional leg strength to perform 10 reps of 6 in step ups with light hands.     Long Term goal:   Patient  will be able to successfully perform sit to stand from her the drivers side independently.  Patient  will be able to ambulate for > 20 minutes with a shopping cart to allow her to community shop.   Patient  will improve TUG time to < 14 secs to improve dynamic balance and to help decrease fall risk.  Patient  will be able to demonstrate normalized gait pattern with least restrictive device.   Patient  will report full independence with HEP.       Ifrah Valladares, PT

## 2024-04-04 NOTE — PROGRESS NOTES
Physical Therapy  Physical Therapy Treatment Note    Patient Name: Blanquita Morales  MRN: [id number]  Today's Date: 2024  Time Calculation  Start Time: 1015  Stop Time: 1052  Time Calculation (min): 37 min    PT Therapeutic Procedures Time Entry  Therapeutic Exercise Time Entry: 35            Insurance:  Visit number: 5 of 10  Authorization info: auth through 24  Certification dates:   3/12/24 to 24     Current Problem  1. Giant cell tumor of bone  Follow Up In Physical Therapy      2. Muscle weakness on examination  Follow Up In Physical Therapy      3. Difficulty walking  Follow Up In Physical Therapy                Precautions     Precautions  STEADI Fall Risk Score (The score of 4 or more indicates an increased risk of falling): 7  WBAT LLE, ROM as tolerated    Subjective:   She reports that she is doing very well. She is using a Straight cane in the community and will go without in the home. She is feeling comfortable with her HEP and wants to know if she can continue on her own at this time       Pain     No new pain   Objective:   3 laps around the 404 ft at one effort   Left knee WFL for flexion /ext    Hip flex/ext/abd/add 4/5  Quad /hamstring 4+/5    Stair climbing: able to to non reciprocal up and down a set of stairs with one hand on railing and one on cane  Sit to stand x 4 reps   TU secs  Treatments:     - Bike x 6 min for ROM  - seated knee slide x 2 min on the left side  - LE assessment  - stair climbing  -  3 in step up and down  - 3 in forward step down   -sit to stand with hands on chair  - review of HEP:  hand out given with details on LE strengthening for the quad  - aquatics review:  walking patterns LE strengthening     -- review of HEP and guidelines       TE 3      Assessment:       Pt has achieved all of her current goals.  Able to demonstrate good independence with her program.  Advised on the importance of continuing with her HEP to prevent future complications     Plan:       Discharge Summary    Name: Blanquita Morales  MRN: [id number]  : 1950  Date: 24    Discharge Summary: PT        Rehab Discharge Reason: Achieved all and/or the most significant goals(s)      Goals:   Short term goals 6 weeks   Patient  will be able to stand for > 20 minutes and assist folding laundry and putting away dishes. MET   Patient  will be able to walk for > 5 minutes at one effort with least restrictive device MET  Patient  will be able to improve left functional leg strength to perform 10 reps of 6 in step ups with light hands. MET     Long Term goal:   Patient  will be able to successfully perform sit to stand from her the drivers side independently. MET  Patient  will be able to ambulate for > 20 minutes with a shopping cart to allow her to community shop. MET  Patient  will improve TUG time to < 14 secs to improve dynamic balance and to help decrease fall risk.MET  Patient  will be able to demonstrate normalized gait pattern with least restrictive device. MET  Patient  will report full independence with HEP.   MEt    Ifrah Valladares, PT

## 2024-04-09 ENCOUNTER — TREATMENT (OUTPATIENT)
Dept: PHYSICAL THERAPY | Facility: CLINIC | Age: 74
End: 2024-04-09
Payer: MEDICARE

## 2024-04-09 DIAGNOSIS — R26.2 DIFFICULTY WALKING: ICD-10-CM

## 2024-04-09 DIAGNOSIS — D48.0 GIANT CELL TUMOR OF BONE: Primary | ICD-10-CM

## 2024-04-09 DIAGNOSIS — M62.81 MUSCLE WEAKNESS ON EXAMINATION: ICD-10-CM

## 2024-04-09 PROCEDURE — 97110 THERAPEUTIC EXERCISES: CPT | Mod: GP

## 2024-04-16 ENCOUNTER — APPOINTMENT (OUTPATIENT)
Dept: PHYSICAL THERAPY | Facility: CLINIC | Age: 74
End: 2024-04-16
Payer: MEDICARE

## 2024-04-23 ENCOUNTER — APPOINTMENT (OUTPATIENT)
Dept: PHYSICAL THERAPY | Facility: CLINIC | Age: 74
End: 2024-04-23
Payer: MEDICARE

## 2024-04-30 ENCOUNTER — APPOINTMENT (OUTPATIENT)
Dept: PHYSICAL THERAPY | Facility: CLINIC | Age: 74
End: 2024-04-30
Payer: MEDICARE

## 2024-05-08 ENCOUNTER — APPOINTMENT (OUTPATIENT)
Dept: ORTHOPEDIC SURGERY | Facility: CLINIC | Age: 74
End: 2024-05-08
Payer: MEDICARE

## 2024-05-08 ENCOUNTER — APPOINTMENT (OUTPATIENT)
Dept: RADIOLOGY | Facility: CLINIC | Age: 74
End: 2024-05-08
Payer: MEDICARE

## 2024-05-13 ENCOUNTER — TELEPHONE (OUTPATIENT)
Dept: PRIMARY CARE | Facility: CLINIC | Age: 74
End: 2024-05-13
Payer: MEDICARE

## 2024-05-13 NOTE — TELEPHONE ENCOUNTER
Pt's daughter is requesting a call back regarding an appt sooner than the 31st because her mother is not feeling well. She is not experiencing any cough or fever and is negative for covid. The pt has not been seen since October 24 2023 and the daughter is stating that she cannot wait til the 31st for the first morning appt.

## 2024-05-14 ENCOUNTER — OFFICE VISIT (OUTPATIENT)
Dept: PRIMARY CARE | Facility: CLINIC | Age: 74
End: 2024-05-14
Payer: MEDICARE

## 2024-05-14 ENCOUNTER — HOSPITAL ENCOUNTER (OUTPATIENT)
Dept: RADIOLOGY | Facility: CLINIC | Age: 74
Discharge: HOME | End: 2024-05-14
Payer: MEDICARE

## 2024-05-14 VITALS
SYSTOLIC BLOOD PRESSURE: 100 MMHG | RESPIRATION RATE: 16 BRPM | TEMPERATURE: 96.9 F | HEIGHT: 66 IN | BODY MASS INDEX: 30.7 KG/M2 | DIASTOLIC BLOOD PRESSURE: 58 MMHG | WEIGHT: 191 LBS | HEART RATE: 62 BPM

## 2024-05-14 DIAGNOSIS — D50.9 IRON DEFICIENCY ANEMIA, UNSPECIFIED IRON DEFICIENCY ANEMIA TYPE: ICD-10-CM

## 2024-05-14 DIAGNOSIS — R73.9 HYPERGLYCEMIA: ICD-10-CM

## 2024-05-14 DIAGNOSIS — R53.81 MALAISE AND FATIGUE: ICD-10-CM

## 2024-05-14 DIAGNOSIS — M25.50 ARTHRALGIA, UNSPECIFIED JOINT: ICD-10-CM

## 2024-05-14 DIAGNOSIS — R53.83 MALAISE AND FATIGUE: ICD-10-CM

## 2024-05-14 DIAGNOSIS — J20.9 ACUTE BRONCHITIS WITH BRONCHOSPASM: ICD-10-CM

## 2024-05-14 DIAGNOSIS — E53.8 VITAMIN B12 DEFICIENCY: ICD-10-CM

## 2024-05-14 DIAGNOSIS — J40 BRONCHITIS: Primary | ICD-10-CM

## 2024-05-14 DIAGNOSIS — E55.9 VITAMIN D DEFICIENCY: ICD-10-CM

## 2024-05-14 DIAGNOSIS — J40 BRONCHITIS: ICD-10-CM

## 2024-05-14 DIAGNOSIS — E78.5 HYPERLIPIDEMIA, UNSPECIFIED HYPERLIPIDEMIA TYPE: ICD-10-CM

## 2024-05-14 LAB
CRP SERPL-MCNC: 0.39 MG/DL
ERYTHROCYTE [SEDIMENTATION RATE] IN BLOOD BY WESTERGREN METHOD: 57 MM/H (ref 0–30)
RHEUMATOID FACT SER NEPH-ACNC: <10 IU/ML (ref 0–15)
URATE SERPL-MCNC: 3.8 MG/DL (ref 2.3–6.7)

## 2024-05-14 PROCEDURE — 3078F DIAST BP <80 MM HG: CPT | Performed by: INTERNAL MEDICINE

## 2024-05-14 PROCEDURE — 99213 OFFICE O/P EST LOW 20 MIN: CPT | Performed by: INTERNAL MEDICINE

## 2024-05-14 PROCEDURE — 82306 VITAMIN D 25 HYDROXY: CPT | Performed by: INTERNAL MEDICINE

## 2024-05-14 PROCEDURE — 86431 RHEUMATOID FACTOR QUANT: CPT

## 2024-05-14 PROCEDURE — 86235 NUCLEAR ANTIGEN ANTIBODY: CPT

## 2024-05-14 PROCEDURE — 84450 TRANSFERASE (AST) (SGOT): CPT | Performed by: INTERNAL MEDICINE

## 2024-05-14 PROCEDURE — 3074F SYST BP LT 130 MM HG: CPT | Performed by: INTERNAL MEDICINE

## 2024-05-14 PROCEDURE — 86200 CCP ANTIBODY: CPT

## 2024-05-14 PROCEDURE — 85025 COMPLETE CBC W/AUTO DIFF WBC: CPT | Performed by: INTERNAL MEDICINE

## 2024-05-14 PROCEDURE — 1159F MED LIST DOCD IN RCRD: CPT | Performed by: INTERNAL MEDICINE

## 2024-05-14 PROCEDURE — 86038 ANTINUCLEAR ANTIBODIES: CPT

## 2024-05-14 PROCEDURE — 83036 HEMOGLOBIN GLYCOSYLATED A1C: CPT | Performed by: INTERNAL MEDICINE

## 2024-05-14 PROCEDURE — 86140 C-REACTIVE PROTEIN: CPT

## 2024-05-14 PROCEDURE — 85652 RBC SED RATE AUTOMATED: CPT

## 2024-05-14 PROCEDURE — 86225 DNA ANTIBODY NATIVE: CPT

## 2024-05-14 PROCEDURE — 1126F AMNT PAIN NOTED NONE PRSNT: CPT | Performed by: INTERNAL MEDICINE

## 2024-05-14 PROCEDURE — 71046 X-RAY EXAM CHEST 2 VIEWS: CPT | Performed by: RADIOLOGY

## 2024-05-14 PROCEDURE — 1160F RVW MEDS BY RX/DR IN RCRD: CPT | Performed by: INTERNAL MEDICINE

## 2024-05-14 PROCEDURE — 36415 COLL VENOUS BLD VENIPUNCTURE: CPT

## 2024-05-14 PROCEDURE — 80048 BASIC METABOLIC PNL TOTAL CA: CPT | Performed by: INTERNAL MEDICINE

## 2024-05-14 PROCEDURE — 84443 ASSAY THYROID STIM HORMONE: CPT | Performed by: INTERNAL MEDICINE

## 2024-05-14 PROCEDURE — 82607 VITAMIN B-12: CPT | Performed by: INTERNAL MEDICINE

## 2024-05-14 PROCEDURE — 84550 ASSAY OF BLOOD/URIC ACID: CPT

## 2024-05-14 PROCEDURE — 71046 X-RAY EXAM CHEST 2 VIEWS: CPT

## 2024-05-14 PROCEDURE — 84460 ALANINE AMINO (ALT) (SGPT): CPT | Performed by: INTERNAL MEDICINE

## 2024-05-14 PROCEDURE — 80061 LIPID PANEL: CPT | Performed by: INTERNAL MEDICINE

## 2024-05-14 PROCEDURE — 1036F TOBACCO NON-USER: CPT | Performed by: INTERNAL MEDICINE

## 2024-05-14 RX ORDER — AZITHROMYCIN 250 MG/1
TABLET, FILM COATED ORAL
COMMUNITY
Start: 2024-05-12 | End: 2024-05-22 | Stop reason: ALTCHOICE

## 2024-05-14 RX ORDER — METHYLPREDNISOLONE 4 MG/1
TABLET ORAL
Qty: 21 TABLET | Refills: 0 | Status: SHIPPED | OUTPATIENT
Start: 2024-05-14 | End: 2024-05-22 | Stop reason: ALTCHOICE

## 2024-05-14 RX ORDER — BENZONATATE 200 MG/1
200 CAPSULE ORAL 3 TIMES DAILY PRN
Qty: 42 CAPSULE | Refills: 0 | Status: SHIPPED | OUTPATIENT
Start: 2024-05-14 | End: 2024-05-22 | Stop reason: ALTCHOICE

## 2024-05-14 RX ORDER — AZITHROMYCIN 250 MG/1
TABLET, FILM COATED ORAL DAILY
Qty: 6 TABLET | Refills: 0 | Status: SHIPPED | OUTPATIENT
Start: 2024-05-14 | End: 2024-05-22 | Stop reason: ALTCHOICE

## 2024-05-14 ASSESSMENT — ENCOUNTER SYMPTOMS
OCCASIONAL FEELINGS OF UNSTEADINESS: 0
WEIGHT LOSS: 0
SORE THROAT: 1
HEMOPTYSIS: 0
SHORTNESS OF BREATH: 0
FEVER: 1
WHEEZING: 0
MYALGIAS: 0
CHILLS: 1
DEPRESSION: 0
COUGH: 1
LOSS OF SENSATION IN FEET: 0
SWEATS: 1
HEADACHES: 1
HEARTBURN: 0
RHINORRHEA: 1

## 2024-05-14 ASSESSMENT — PAIN SCALES - GENERAL: PAINLEVEL: 0-NO PAIN

## 2024-05-14 NOTE — PROGRESS NOTES
Subjective    Blanquita Morales is a 73 y.o. female who presents  with chief complaint of 1 week cough, wheezing.  Has no fever or chills.      Cough  This is a new problem. The current episode started in the past 7 days. The problem has been waxing and waning. The problem occurs constantly. The cough is Non-productive. Associated symptoms include chills, ear congestion, ear pain, a fever, headaches, nasal congestion, postnasal drip, rhinorrhea, a sore throat and sweats. Pertinent negatives include no chest pain, heartburn, hemoptysis, myalgias, rash, shortness of breath, weight loss or wheezing. Nothing aggravates the symptoms.     This is a 73 years old Serbian-speaking lady with medical history of hyperlipidemia, degenerative joint disease, gastroesophageal reflux disease, hearing loss, carpal tunnel syndrome, psoriasis, history of latent TB,  2022, treated with INH, S/p L patholoogical distal femur Fx, S/p 11/16/2024  ORIF distal femur.  Patient is  presented for evaluation and treatment of the above complaints.        Review of Systems   Constitutional:  Positive for chills and fever. Negative for weight loss.   HENT:  Positive for ear pain, postnasal drip, rhinorrhea and sore throat.    Respiratory:  Positive for cough. Negative for hemoptysis, shortness of breath and wheezing.    Cardiovascular:  Negative for chest pain.   Gastrointestinal:  Negative for heartburn.   Musculoskeletal:  Negative for myalgias.   Skin:  Negative for rash.   Neurological:  Positive for headaches.       Objective        Vitals:    05/14/24 0937   BP: 100/58   Pulse: 62   Resp: 16   Temp: 36.1 °C (96.9 °F)        Physical Exam  HENT:      Head: Normocephalic.      Mouth/Throat:      Mouth: Mucous membranes are moist.   Eyes:      Pupils: Pupils are equal, round, and reactive to light.   Cardiovascular:      Rate and Rhythm: Normal rate and regular rhythm.   Pulmonary:      Breath sounds: Wheezing and rhonchi present.   Abdominal:       Palpations: Abdomen is soft.   Neurological:      Mental Status: She is alert. Mental status is at baseline.   Psychiatric:         Mood and Affect: Mood normal.       Diagnoses and all orders for this visit:  Bronchitis (Primary)  -     XR chest 2 views; Future  -     azithromycin (Zithromax) 250 mg tablet; Take 2 tablets (500 mg) by mouth once daily for 1 day, THEN 1 tablet (250 mg) once daily for 4 days. Take 2 tabs (500 mg) by mouth today, than 1 daily for 4 days..  -     methylPREDNISolone (Medrol Dospak) 4 mg tablets; Take as directed on package.  Acute bronchitis with bronchospasm  -     benzonatate (Tessalon) 200 mg capsule; Take 1 capsule (200 mg) by mouth 3 times a day as needed for cough. Do not crush or chew.  Iron deficiency anemia, unspecified iron deficiency anemia type  Hyperlipidemia, unspecified hyperlipidemia type  -     Alanine Aminotransferase  -     Aspartate Aminotransferase  -     Basic Metabolic Panel  -     Lipid Panel  Vitamin D deficiency  -     Vitamin D 25-Hydroxy,Total (for eval of Vitamin D levels)  Hyperglycemia  -     Hemoglobin A1C  Vitamin B12 deficiency  -     Vitamin B12  Malaise and fatigue  -     CBC  -     Thyroid Stimulating Hormone  Arthralgia, unspecified joint  -     JANAK + DINORAH Panel  -     Cancel: Anti-DNA Antibody, Double-Stranded  -     Arthritis Panel (CMS)  -     Citrulline Antibody, IgG  -     C-Reactive Protein     A bronchitis.  Z pack   Take Medrol pack.  Chest X ray.    H of  UTI.  Treated with Cipro.  Recovered.     S/p L patholoogical distal femur Fx.  S/p 11/16/2024  ORIF distal femur.  Followed by DR Gardner  Had open Bx L distal femur lesion.  Has Giant cell tumor L femur.  Patient is due to oncology appointment.    -Hyperlipidemia.  Keep Mediterranean diet, exercise, weight loss.  Repeat lipid panel today.  Last CT cardiac score 2017, was 2.     -Gastroesophageal reflux disease.  Eat small portions.  Avoid Caffeine, spicy foods, chocolate, alcohol.  Do not  wear tight clothes.  Keep last meal before bed time > 3 hours.  Keep head side of the bed elevated at all time.     - Hyperlipidemia.  Diet, exercise.  Keep Mediterranean diet.     -Degenerative joint disease.  Arthralgia.  Take Tylenol as needed.  Exercise.     -History of latent TB.  Treated with INH.     -Health maintenance.  Medicare wellness annual exam is  up to date.  DEXA scan, cologuard is ordered.   Referral for mammography.     - Class 1 Obesity with 30.83  Diet, exercise.  Keep ideal BMI less than 25.     Teresa Swenson MD

## 2024-05-15 LAB
CCP IGG SERPL-ACNC: <1 U/ML
CENTROMERE B AB SER-ACNC: <0.2 AI
CHROMATIN AB SERPL-ACNC: <0.2 AI
DSDNA AB SER-ACNC: <1 IU/ML
ENA JO1 AB SER QL IA: <0.2 AI
ENA RNP AB SER IA-ACNC: <0.2 AI
ENA SCL70 AB SER QL IA: <0.2 AI
ENA SM AB SER IA-ACNC: <0.2 AI
ENA SM+RNP AB SER QL IA: <0.2 AI
ENA SS-A AB SER IA-ACNC: <0.2 AI
ENA SS-B AB SER IA-ACNC: <0.2 AI
RIBOSOMAL P AB SER-ACNC: <0.2 AI

## 2024-05-18 LAB — ANA SER QL HEP2 SUBST: NEGATIVE

## 2024-05-22 ENCOUNTER — HOSPITAL ENCOUNTER (OUTPATIENT)
Dept: RADIOLOGY | Facility: CLINIC | Age: 74
Discharge: HOME | End: 2024-05-22
Payer: MEDICARE

## 2024-05-22 ENCOUNTER — OFFICE VISIT (OUTPATIENT)
Dept: ORTHOPEDIC SURGERY | Facility: CLINIC | Age: 74
End: 2024-05-22
Payer: MEDICARE

## 2024-05-22 VITALS — BODY MASS INDEX: 30.53 KG/M2 | WEIGHT: 190 LBS | HEIGHT: 66 IN

## 2024-05-22 DIAGNOSIS — D48.0 GIANT CELL TUMOR OF BONE: ICD-10-CM

## 2024-05-22 PROCEDURE — 1159F MED LIST DOCD IN RCRD: CPT | Performed by: STUDENT IN AN ORGANIZED HEALTH CARE EDUCATION/TRAINING PROGRAM

## 2024-05-22 PROCEDURE — 73560 X-RAY EXAM OF KNEE 1 OR 2: CPT | Mod: LT

## 2024-05-22 PROCEDURE — 99214 OFFICE O/P EST MOD 30 MIN: CPT | Performed by: STUDENT IN AN ORGANIZED HEALTH CARE EDUCATION/TRAINING PROGRAM

## 2024-05-22 PROCEDURE — 1036F TOBACCO NON-USER: CPT | Performed by: STUDENT IN AN ORGANIZED HEALTH CARE EDUCATION/TRAINING PROGRAM

## 2024-05-22 PROCEDURE — 1160F RVW MEDS BY RX/DR IN RCRD: CPT | Performed by: STUDENT IN AN ORGANIZED HEALTH CARE EDUCATION/TRAINING PROGRAM

## 2024-05-22 PROCEDURE — G2211 COMPLEX E/M VISIT ADD ON: HCPCS | Performed by: STUDENT IN AN ORGANIZED HEALTH CARE EDUCATION/TRAINING PROGRAM

## 2024-05-22 PROCEDURE — 73560 X-RAY EXAM OF KNEE 1 OR 2: CPT | Mod: LEFT SIDE | Performed by: RADIOLOGY

## 2024-05-22 ASSESSMENT — ENCOUNTER SYMPTOMS
OCCASIONAL FEELINGS OF UNSTEADINESS: 1
DEPRESSION: 0
LOSS OF SENSATION IN FEET: 0

## 2024-05-22 ASSESSMENT — PAIN - FUNCTIONAL ASSESSMENT: PAIN_FUNCTIONAL_ASSESSMENT: NO/DENIES PAIN

## 2024-05-22 NOTE — PROGRESS NOTES
[facility] Progress Note:    CC: Follow-up giant cell tumor bone, pathologic fracture left distal femur    S: 73 y.o. 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  She was treated with open biopsy, curettage as well as plate fixation in addition to cement in November 2023.  She is now 6 months status post her surgery which has been weight bearing without any issues. Pain controlled back to normal activities.     Objective:    Exam:  Gen: alert, appropriately conversational, no apparent distress  Chest: symmetric chest rise, non-labored breathing  Heart: RRR to peripheral palpation    Physical exam of the left lower extremity shows healed surgical incisions with no evidence of erythema or drainage. She lacks 3 degrees of terminal extension and can flex to approximately 110 degrees. Sensation is intact light touch distally in all distributions and she has palpable pulses and brisk capillary refill distally. EHL, dorsiflexion and plantarflexion are intact.     Imaging:  Radiographs obtained the left knee were obtained today and individually reviewed by myself which show stable orthopedic fixation as well as good cement placement.  There is no evidence of lucency around the cement mantle to suggest local recurrence.  Consolidation of fracture.    A/P:  Pathologic fracture through a left distal femur giant cell tumor of bone s/p curretage, ORIF and cement placement November 2023    All questions were answered and at this time I would like the patient to return to see us in 6 months with repeat left knee films and CXR.  As long as everything is looking appropriate at that time we will then continue surveillance on a 6-month interval for the first 2 years. Patient verbalized understanding and agreeable to plan. Continue WBAT and activities as tolerated.

## 2024-06-18 ENCOUNTER — APPOINTMENT (OUTPATIENT)
Dept: PRIMARY CARE | Facility: CLINIC | Age: 74
End: 2024-06-18
Payer: MEDICARE

## 2024-06-18 VITALS
WEIGHT: 194 LBS | SYSTOLIC BLOOD PRESSURE: 102 MMHG | BODY MASS INDEX: 31.31 KG/M2 | RESPIRATION RATE: 16 BRPM | TEMPERATURE: 97.1 F | HEART RATE: 62 BPM | DIASTOLIC BLOOD PRESSURE: 60 MMHG

## 2024-06-18 DIAGNOSIS — Z12.31 ENCOUNTER FOR SCREENING MAMMOGRAM FOR MALIGNANT NEOPLASM OF BREAST: Primary | ICD-10-CM

## 2024-06-18 DIAGNOSIS — D50.9 IRON DEFICIENCY ANEMIA, UNSPECIFIED IRON DEFICIENCY ANEMIA TYPE: ICD-10-CM

## 2024-06-18 LAB
FERRITIN SERPL-MCNC: 72 NG/ML (ref 8–150)
IRON SATN MFR SERPL: 31 % (ref 25–45)
IRON SERPL-MCNC: 132 UG/DL (ref 35–150)
TIBC SERPL-MCNC: 426 UG/DL (ref 240–445)
UIBC SERPL-MCNC: 294 UG/DL (ref 110–370)

## 2024-06-18 PROCEDURE — 1160F RVW MEDS BY RX/DR IN RCRD: CPT | Performed by: INTERNAL MEDICINE

## 2024-06-18 PROCEDURE — 82728 ASSAY OF FERRITIN: CPT

## 2024-06-18 PROCEDURE — 99213 OFFICE O/P EST LOW 20 MIN: CPT | Performed by: INTERNAL MEDICINE

## 2024-06-18 PROCEDURE — 85025 COMPLETE CBC W/AUTO DIFF WBC: CPT | Performed by: INTERNAL MEDICINE

## 2024-06-18 PROCEDURE — 3074F SYST BP LT 130 MM HG: CPT | Performed by: INTERNAL MEDICINE

## 2024-06-18 PROCEDURE — 1125F AMNT PAIN NOTED PAIN PRSNT: CPT | Performed by: INTERNAL MEDICINE

## 2024-06-18 PROCEDURE — 1036F TOBACCO NON-USER: CPT | Performed by: INTERNAL MEDICINE

## 2024-06-18 PROCEDURE — 83550 IRON BINDING TEST: CPT

## 2024-06-18 PROCEDURE — 83540 ASSAY OF IRON: CPT

## 2024-06-18 PROCEDURE — 1159F MED LIST DOCD IN RCRD: CPT | Performed by: INTERNAL MEDICINE

## 2024-06-18 PROCEDURE — 3078F DIAST BP <80 MM HG: CPT | Performed by: INTERNAL MEDICINE

## 2024-06-18 ASSESSMENT — ENCOUNTER SYMPTOMS
FATIGUE: 1
ACTIVITY CHANGE: 1
ARTHRALGIAS: 1
SHORTNESS OF BREATH: 0
COUGH: 0
BACK PAIN: 1
CHEST TIGHTNESS: 0

## 2024-06-18 ASSESSMENT — PAIN SCALES - GENERAL: PAINLEVEL: 3

## 2024-06-18 NOTE — PROGRESS NOTES
Subjective    Blanquita Morales is a 73 y.o. female who presents for follow up.  She is here for follow up of her abnormal BW.    HPI    This is a 73 years old Salvadorean-speaking lady with medical history of hyperlipidemia, degenerative joint disease, gastroesophageal reflux disease, hearing loss, carpal tunnel syndrome, psoriasis, history of latent TB,  2022, treated with INH, S/p L patholoogical distal femur Fx, S/p 11/16/2024  ORIF distal femur.  Patient is  presented for evaluation and treatment of the above complaints.  She is here for BW.     Review of Systems   Constitutional:  Positive for activity change and fatigue.   Respiratory:  Negative for cough, chest tightness and shortness of breath.    Musculoskeletal:  Positive for arthralgias, back pain and gait problem.       Objective        Vitals:    06/18/24 0947   BP: 102/60   Pulse: 62   Resp: 16   Temp: 36.2 °C (97.1 °F)        Physical Exam  Constitutional:       Appearance: Normal appearance.   HENT:      Head: Normocephalic and atraumatic.      Mouth/Throat:      Mouth: Mucous membranes are moist.   Cardiovascular:      Rate and Rhythm: Normal rate and regular rhythm.      Heart sounds: Normal heart sounds.   Pulmonary:      Breath sounds: Normal breath sounds.   Musculoskeletal:         General: Tenderness present.   Skin:     General: Skin is warm.   Neurological:      Mental Status: She is alert. Mental status is at baseline.       Diagnoses and all orders for this visit:  Encounter for screening mammogram for malignant neoplasm of breast (Primary)  -     BI mammo bilateral screening tomosynthesis; Future  Iron deficiency anemia, unspecified iron deficiency anemia type  -     CBC  -     Ferritin  -     Iron and TIBC      S/p L patholoogical distal femur Fx.  S/p 11/16/2024  ORIF distal femur.  Followed by DR Gardner  Had open Bx L distal femur lesion, curettage as well as plate fixation, in addition to cement 11/2023.  Has Giant cell tumor L  femur.  Patient is due to oncology appointment.     -Hyperlipidemia.  Keep Mediterranean diet, exercise, weight loss.  Repeat lipid panel today.  Last CT cardiac score 2017, was 2.     -Gastroesophageal reflux disease.  Eat small portions.  Avoid Caffeine, spicy foods, chocolate, alcohol.  Do not wear tight clothes.  Keep last meal before bed time > 3 hours.  Keep head side of the bed elevated at all time.     S/p A bronchitis.   Treated with steroids, antibiotics.  Improved.     H of  UTI.  Treated with Cipro.  Recovered.    - Hyperlipidemia.  Diet, exercise.  Keep Mediterranean diet.     -Degenerative joint disease.  Arthralgia.  Take Tylenol as needed.  Exercise.     -History of latent TB.  Treated with INH.     -Health maintenance.  Medicare wellness annual exam is  up to date.  DEXA scan, cologuard is ordered.   Referral for mammography.     - Class 1 Obesity with 31.31.  Diet, exercise.  Keep ideal BMI less than 25.      Teresa Swenson MD

## 2024-08-12 DIAGNOSIS — B35.1 TINEA UNGUIUM: Primary | ICD-10-CM

## 2024-08-13 ENCOUNTER — LAB (OUTPATIENT)
Dept: LAB | Facility: LAB | Age: 74
End: 2024-08-13
Payer: MEDICARE

## 2024-08-13 DIAGNOSIS — B35.1 TINEA UNGUIUM: ICD-10-CM

## 2024-08-13 PROCEDURE — 84450 TRANSFERASE (AST) (SGOT): CPT

## 2024-08-13 PROCEDURE — 36415 COLL VENOUS BLD VENIPUNCTURE: CPT

## 2024-08-13 PROCEDURE — 84460 ALANINE AMINO (ALT) (SGPT): CPT

## 2024-08-14 ENCOUNTER — HOSPITAL ENCOUNTER (OUTPATIENT)
Dept: RADIOLOGY | Facility: CLINIC | Age: 74
Discharge: HOME | End: 2024-08-14
Payer: MEDICARE

## 2024-08-14 DIAGNOSIS — D48.0 GIANT CELL TUMOR OF BONE: ICD-10-CM

## 2024-08-14 DIAGNOSIS — D48.0 GIANT CELL TUMOR OF BONE: Primary | ICD-10-CM

## 2024-08-14 LAB
ALT SERPL W P-5'-P-CCNC: 22 U/L (ref 7–45)
AST SERPL W P-5'-P-CCNC: 21 U/L (ref 9–39)

## 2024-08-14 PROCEDURE — 73560 X-RAY EXAM OF KNEE 1 OR 2: CPT | Mod: LEFT SIDE | Performed by: RADIOLOGY

## 2024-08-14 PROCEDURE — 73560 X-RAY EXAM OF KNEE 1 OR 2: CPT | Mod: LT

## 2024-08-21 ENCOUNTER — APPOINTMENT (OUTPATIENT)
Dept: PRIMARY CARE | Facility: CLINIC | Age: 74
End: 2024-08-21
Payer: MEDICARE

## 2024-08-27 ENCOUNTER — APPOINTMENT (OUTPATIENT)
Dept: RADIOLOGY | Facility: CLINIC | Age: 74
End: 2024-08-27
Payer: MEDICARE

## 2024-08-28 ENCOUNTER — APPOINTMENT (OUTPATIENT)
Dept: ORTHOPEDIC SURGERY | Facility: CLINIC | Age: 74
End: 2024-08-28
Payer: MEDICARE

## 2024-09-04 ENCOUNTER — APPOINTMENT (OUTPATIENT)
Dept: ORTHOPEDIC SURGERY | Facility: CLINIC | Age: 74
End: 2024-09-04
Payer: MEDICARE

## 2024-09-12 DIAGNOSIS — B35.3 TINEA PEDIS OF BOTH FEET: Primary | ICD-10-CM

## 2024-09-13 ENCOUNTER — LAB (OUTPATIENT)
Dept: LAB | Facility: LAB | Age: 74
End: 2024-09-13
Payer: MEDICARE

## 2024-09-13 DIAGNOSIS — B35.3 TINEA PEDIS OF BOTH FEET: ICD-10-CM

## 2024-09-13 LAB
ALT SERPL W P-5'-P-CCNC: 21 U/L (ref 7–45)
AST SERPL W P-5'-P-CCNC: 19 U/L (ref 9–39)

## 2024-09-13 PROCEDURE — 84460 ALANINE AMINO (ALT) (SGPT): CPT

## 2024-09-13 PROCEDURE — 84450 TRANSFERASE (AST) (SGOT): CPT

## 2024-09-13 PROCEDURE — 36415 COLL VENOUS BLD VENIPUNCTURE: CPT

## 2024-10-17 ENCOUNTER — HOSPITAL ENCOUNTER (OUTPATIENT)
Dept: RADIOLOGY | Facility: CLINIC | Age: 74
Discharge: HOME | End: 2024-10-17
Payer: MEDICARE

## 2024-10-17 ENCOUNTER — OFFICE VISIT (OUTPATIENT)
Dept: ORTHOPEDIC SURGERY | Facility: CLINIC | Age: 74
End: 2024-10-17
Payer: MEDICARE

## 2024-10-17 DIAGNOSIS — D48.0 GIANT CELL TUMOR OF BONE: Primary | ICD-10-CM

## 2024-10-17 DIAGNOSIS — D48.0 GIANT CELL TUMOR OF BONE: ICD-10-CM

## 2024-10-17 PROCEDURE — 1159F MED LIST DOCD IN RCRD: CPT | Performed by: STUDENT IN AN ORGANIZED HEALTH CARE EDUCATION/TRAINING PROGRAM

## 2024-10-17 PROCEDURE — 99214 OFFICE O/P EST MOD 30 MIN: CPT | Performed by: STUDENT IN AN ORGANIZED HEALTH CARE EDUCATION/TRAINING PROGRAM

## 2024-10-17 PROCEDURE — 71046 X-RAY EXAM CHEST 2 VIEWS: CPT | Performed by: RADIOLOGY

## 2024-10-17 PROCEDURE — 73560 X-RAY EXAM OF KNEE 1 OR 2: CPT | Mod: LEFT SIDE | Performed by: RADIOLOGY

## 2024-10-17 PROCEDURE — 1160F RVW MEDS BY RX/DR IN RCRD: CPT | Performed by: STUDENT IN AN ORGANIZED HEALTH CARE EDUCATION/TRAINING PROGRAM

## 2024-10-17 PROCEDURE — 71046 X-RAY EXAM CHEST 2 VIEWS: CPT

## 2024-10-17 PROCEDURE — G2211 COMPLEX E/M VISIT ADD ON: HCPCS | Performed by: STUDENT IN AN ORGANIZED HEALTH CARE EDUCATION/TRAINING PROGRAM

## 2024-10-17 PROCEDURE — 73560 X-RAY EXAM OF KNEE 1 OR 2: CPT | Mod: LT

## 2024-10-17 PROCEDURE — 1036F TOBACCO NON-USER: CPT | Performed by: STUDENT IN AN ORGANIZED HEALTH CARE EDUCATION/TRAINING PROGRAM

## 2024-10-17 NOTE — PROGRESS NOTES
[facility] Progress Note:    CC: Follow-up giant cell tumor bone, pathologic fracture left distal femur    S: 74 y.o. 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  She was treated with open biopsy, curettage as well as plate fixation in addition to cement in November 2023.  She is accompanied by her daughter who states that she really has not been doing any therapy at home but has been complaining of increased difficulty with activity.  She complains of pain behind the knee and states is hard for her to bend over or lift her leg up.  She does not do physical therapy any longer and again they state that she did not really continue any exercises in the home either.  They are not sure if this is good deconditioning or tumor recurrence.  They did get a repeat x-ray in August just to make sure there was nothing acutely going wrong at that time as well.     Objective:    Exam:  Gen: alert, appropriately conversational, no apparent distress  Chest: symmetric chest rise, non-labored breathing  Heart: RRR to peripheral palpation    Physical exam of the left lower extremity shows healed surgical incisions with no evidence of erythema or drainage. She lacks 3 degrees of terminal extension and can flex to approximately 110 degrees. Sensation is intact light touch distally in all distributions and she has palpable pulses and brisk capillary refill distally. EHL, dorsiflexion and plantarflexion are intact.  She does not appear to have any obvious soft tissue mass and there is no obvious swelling or effusion.  I am unable to elicit pain with range of motion of the knee or hip.    Imaging:  Radiographs obtained the left knee were obtained today and individually reviewed by myself which show stable orthopedic fixation as well as good cement placement.  There is no evidence of lucency around the cement mantle to suggest local recurrence.  Consolidation of  fracture noted.  Chest x-ray was also obtained which demonstrates no obvious evidence of metastatic disease to the chest.    A/P:  Pathologic fracture through a left distal femur giant cell tumor of bone s/p curretage, ORIF and cement placement November 2023    Ultimately the plan will likely be continued surveillance with probably physical therapy but in order to make sure we do not have something that is not showing up radiographically I did tell him we can get an MRI of the left knee with and without contrast to make sure is no soft tissue recurrence.  MRI has been ordered and ultimately I would like to have her present either in person or virtually to go over the results once done.  If there is a recurrence we can address it at that time but if there is no evidence of recurrence and will likely recommend PT and continued 6-month interval follow-up.  Patient verbalized understanding and agreeable to plan. Continue WBAT and activities as tolerated.

## 2024-10-18 ENCOUNTER — TELEMEDICINE (OUTPATIENT)
Dept: PRIMARY CARE | Facility: CLINIC | Age: 74
End: 2024-10-18
Payer: MEDICARE

## 2024-10-18 NOTE — PROGRESS NOTES
Subjective   Patient ID: Blanquita Morales is a 74 y.o. female who presents for No chief complaint on file..    HPI     Review of Systems    Objective   There were no vitals taken for this visit.    Physical Exam    Assessment/Plan   {Assess/PlanSmartLinks:[id number]}

## 2024-10-20 ENCOUNTER — HOSPITAL ENCOUNTER (OUTPATIENT)
Dept: RADIOLOGY | Facility: HOSPITAL | Age: 74
Discharge: HOME | End: 2024-10-20
Payer: MEDICARE

## 2024-10-20 DIAGNOSIS — D48.0 GIANT CELL TUMOR OF BONE: ICD-10-CM

## 2024-10-20 PROCEDURE — 2550000001 HC RX 255 CONTRASTS: Performed by: STUDENT IN AN ORGANIZED HEALTH CARE EDUCATION/TRAINING PROGRAM

## 2024-10-20 PROCEDURE — 73723 MRI JOINT LWR EXTR W/O&W/DYE: CPT | Mod: LT

## 2024-10-20 PROCEDURE — A9575 INJ GADOTERATE MEGLUMI 0.1ML: HCPCS | Performed by: STUDENT IN AN ORGANIZED HEALTH CARE EDUCATION/TRAINING PROGRAM

## 2024-10-20 PROCEDURE — 73723 MRI JOINT LWR EXTR W/O&W/DYE: CPT | Mod: LEFT SIDE | Performed by: RADIOLOGY

## 2024-10-20 RX ORDER — GADOTERATE MEGLUMINE 376.9 MG/ML
20 INJECTION INTRAVENOUS
Status: COMPLETED | OUTPATIENT
Start: 2024-10-20 | End: 2024-10-20

## 2024-10-22 ENCOUNTER — APPOINTMENT (OUTPATIENT)
Dept: PRIMARY CARE | Facility: CLINIC | Age: 74
End: 2024-10-22
Payer: MEDICARE

## 2024-10-22 VITALS — SYSTOLIC BLOOD PRESSURE: 110 MMHG | DIASTOLIC BLOOD PRESSURE: 72 MMHG | RESPIRATION RATE: 16 BRPM | HEART RATE: 64 BPM

## 2024-10-22 DIAGNOSIS — D48.0 GIANT CELL TUMOR OF BONE: Primary | ICD-10-CM

## 2024-10-22 DIAGNOSIS — Z12.31 ENCOUNTER FOR SCREENING MAMMOGRAM FOR MALIGNANT NEOPLASM OF BREAST: ICD-10-CM

## 2024-10-22 DIAGNOSIS — R42 DIZZINESS: ICD-10-CM

## 2024-10-22 DIAGNOSIS — Z00.00 ROUTINE GENERAL MEDICAL EXAMINATION AT HEALTH CARE FACILITY: ICD-10-CM

## 2024-10-22 DIAGNOSIS — Z12.11 ENCOUNTER FOR SCREENING FOR MALIGNANT NEOPLASM OF COLON: ICD-10-CM

## 2024-10-22 DIAGNOSIS — K21.00 GASTROESOPHAGEAL REFLUX DISEASE WITH ESOPHAGITIS, UNSPECIFIED WHETHER HEMORRHAGE: ICD-10-CM

## 2024-10-22 DIAGNOSIS — S72.90XA CLOSED FRACTURE OF FEMUR, UNSPECIFIED FRACTURE MORPHOLOGY, UNSPECIFIED LATERALITY, UNSPECIFIED PORTION OF FEMUR, INITIAL ENCOUNTER (MULTI): ICD-10-CM

## 2024-10-22 DIAGNOSIS — R26.2 DIFFICULTY WALKING: ICD-10-CM

## 2024-10-22 PROBLEM — E88.89 STEATOSIS (MULTI): Status: RESOLVED | Noted: 2023-03-17 | Resolved: 2024-10-22

## 2024-10-22 PROBLEM — D70.9 NEUTROPENIA: Status: RESOLVED | Noted: 2023-03-17 | Resolved: 2024-10-22

## 2024-10-22 PROCEDURE — 1170F FXNL STATUS ASSESSED: CPT | Performed by: INTERNAL MEDICINE

## 2024-10-22 PROCEDURE — 1123F ACP DISCUSS/DSCN MKR DOCD: CPT | Performed by: INTERNAL MEDICINE

## 2024-10-22 PROCEDURE — 3074F SYST BP LT 130 MM HG: CPT | Performed by: INTERNAL MEDICINE

## 2024-10-22 PROCEDURE — 99213 OFFICE O/P EST LOW 20 MIN: CPT | Performed by: INTERNAL MEDICINE

## 2024-10-22 PROCEDURE — 1159F MED LIST DOCD IN RCRD: CPT | Performed by: INTERNAL MEDICINE

## 2024-10-22 PROCEDURE — G0439 PPPS, SUBSEQ VISIT: HCPCS | Performed by: INTERNAL MEDICINE

## 2024-10-22 PROCEDURE — 3078F DIAST BP <80 MM HG: CPT | Performed by: INTERNAL MEDICINE

## 2024-10-22 PROCEDURE — 1158F ADVNC CARE PLAN TLK DOCD: CPT | Performed by: INTERNAL MEDICINE

## 2024-10-22 PROCEDURE — 1160F RVW MEDS BY RX/DR IN RCRD: CPT | Performed by: INTERNAL MEDICINE

## 2024-10-22 RX ORDER — TERBINAFINE HYDROCHLORIDE 250 MG/1
1 TABLET ORAL
COMMUNITY
Start: 2024-09-12

## 2024-10-22 RX ORDER — PHENOL/SODIUM PHENOLATE
20 AEROSOL, SPRAY (ML) MUCOUS MEMBRANE DAILY
Qty: 90 TABLET | Refills: 3 | Status: SHIPPED | OUTPATIENT
Start: 2024-10-22 | End: 2025-01-20

## 2024-10-22 RX ORDER — MECLIZINE HYDROCHLORIDE 25 MG/1
TABLET ORAL
Qty: 30 TABLET | Refills: 1 | Status: SHIPPED | OUTPATIENT
Start: 2024-10-22

## 2024-10-22 ASSESSMENT — ACTIVITIES OF DAILY LIVING (ADL)
DOING HOUSEWORK: NEEDS ASSISTANCE
USING TELEPHONE: INDEPENDENT
EATING: INDEPENDENT
HEARING - RIGHT EAR: FUNCTIONAL
JUDGMENT_ADEQUATE_SAFELY_COMPLETE_DAILY_ACTIVITIES: YES
PREPARING MEALS: INDEPENDENT
ADEQUATE_TO_COMPLETE_ADL: YES
NEEDS ASSISTANCE WITH FOOD: INDEPENDENT
HEARING - LEFT EAR: FUNCTIONAL
TOILETING: INDEPENDENT
GROOMING: INDEPENDENT
PATIENT'S MEMORY ADEQUATE TO SAFELY COMPLETE DAILY ACTIVITIES?: YES
BATHING: NEEDS ASSISTANCE
USING TRANSPORTATION: NEEDS ASSISTANCE
GROCERY SHOPPING: NEEDS ASSISTANCE
PILL BOX USED: YES
FEEDING YOURSELF: INDEPENDENT
WALKS IN HOME: INDEPENDENT
DRESSING YOURSELF: INDEPENDENT
TAKING MEDICATION: INDEPENDENT
STIL DRIVING: YES
MANAGING FINANCES: TOTAL CARE

## 2024-10-22 ASSESSMENT — ENCOUNTER SYMPTOMS
OCCASIONAL FEELINGS OF UNSTEADINESS: 1
FATIGUE: 1
ACTIVITY CHANGE: 1
LOSS OF SENSATION IN FEET: 1
UNEXPECTED WEIGHT CHANGE: 0
BACK PAIN: 1
ARTHRALGIAS: 1
MYALGIAS: 1
DEPRESSION: 1

## 2024-10-22 ASSESSMENT — PATIENT HEALTH QUESTIONNAIRE - PHQ9
10. IF YOU CHECKED OFF ANY PROBLEMS, HOW DIFFICULT HAVE THESE PROBLEMS MADE IT FOR YOU TO DO YOUR WORK, TAKE CARE OF THINGS AT HOME, OR GET ALONG WITH OTHER PEOPLE: SOMEWHAT DIFFICULT
1. LITTLE INTEREST OR PLEASURE IN DOING THINGS: SEVERAL DAYS
SUM OF ALL RESPONSES TO PHQ9 QUESTIONS 1 AND 2: 2
2. FEELING DOWN, DEPRESSED OR HOPELESS: SEVERAL DAYS

## 2024-10-22 ASSESSMENT — ANXIETY QUESTIONNAIRES
5. BEING SO RESTLESS THAT IT IS HARD TO SIT STILL: SEVERAL DAYS
3. WORRYING TOO MUCH ABOUT DIFFERENT THINGS: SEVERAL DAYS
4. TROUBLE RELAXING: SEVERAL DAYS
7. FEELING AFRAID AS IF SOMETHING AWFUL MIGHT HAPPEN: SEVERAL DAYS
GAD7 TOTAL SCORE: 7
2. NOT BEING ABLE TO STOP OR CONTROL WORRYING: SEVERAL DAYS
IF YOU CHECKED OFF ANY PROBLEMS ON THIS QUESTIONNAIRE, HOW DIFFICULT HAVE THESE PROBLEMS MADE IT FOR YOU TO DO YOUR WORK, TAKE CARE OF THINGS AT HOME, OR GET ALONG WITH OTHER PEOPLE: SOMEWHAT DIFFICULT
6. BECOMING EASILY ANNOYED OR IRRITABLE: SEVERAL DAYS
1. FEELING NERVOUS, ANXIOUS, OR ON EDGE: SEVERAL DAYS

## 2024-10-22 ASSESSMENT — COLUMBIA-SUICIDE SEVERITY RATING SCALE - C-SSRS
1. IN THE PAST MONTH, HAVE YOU WISHED YOU WERE DEAD OR WISHED YOU COULD GO TO SLEEP AND NOT WAKE UP?: NO
2. HAVE YOU ACTUALLY HAD ANY THOUGHTS OF KILLING YOURSELF?: NO
6. HAVE YOU EVER DONE ANYTHING, STARTED TO DO ANYTHING, OR PREPARED TO DO ANYTHING TO END YOUR LIFE?: NO

## 2024-10-22 ASSESSMENT — GERIATRIC MINI NUTRITIONAL ASSESSMENT (MNA)
B WEIGHT LOSS DURING THE LAST 3 MONTHS: NO WEIGHT LOSS
C GENERAL MOBILITY: GOES OUT
A HAS FOOD INTAKE DECLINED OVER THE PAST 3 MONTHS DUE TO LOSS OF APPETITE, DIGESTIVE PROBLEMS, CHEWING OR SWALLOWING DIFFICULTIES?: NO DECREASE IN FOOD INTAKE

## 2024-10-22 NOTE — PROGRESS NOTES
Subjective   Patient ID: Blanquita Morales is a 74 y.o. female who presents for Medicare wellness annual exam.  Has L leg pain.  Complaining of dizziness.  Has low Blood pressure.  Has been having difficulty to ambulate, difficulty to change her position.    HPI     This is a 74 years old Jordanian-speaking lady with medical history of hyperlipidemia, degenerative joint disease, gastroesophageal reflux disease, hearing loss, carpal tunnel syndrome, psoriasis, history of latent TB,  2022, treated with INH, S/p L patholoogical distal femur Fx, S/p 11/16/2024  ORIF distal femur.  Patient is  presented for Medicare wellness annual exam.  Has L leg pain, followed by orthopedic surgery.  Has constant pain, will start physical therapy.  Has been having difficulty to change her position, unable to exercise.  Patient is upset, crying.  Has episodes of dizziness, lightheadedness.        Review of Systems   Constitutional:  Positive for activity change and fatigue. Negative for unexpected weight change.   Musculoskeletal:  Positive for arthralgias, back pain and myalgias.       Objective   /72   Pulse 64   Resp 16     Physical Exam  HENT:      Head: Normocephalic.      Nose: Nose normal.   Eyes:      Pupils: Pupils are equal, round, and reactive to light.   Cardiovascular:      Rate and Rhythm: Normal rate.      Heart sounds: Normal heart sounds.   Pulmonary:      Breath sounds: Normal breath sounds.   Abdominal:      Palpations: Abdomen is soft.   Skin:     General: Skin is warm.   Neurological:      Mental Status: She is alert. Mental status is at baseline.   Psychiatric:         Mood and Affect: Mood normal.         Assessment/Plan   Problem List Items Addressed This Visit             ICD-10-CM    GERD (gastroesophageal reflux disease) K21.9    Relevant Medications    omeprazole (PriLOSEC) 20 mg tablet,delayed release (DR/EC) EC tablet    Other Relevant Orders    Referral to Gastroenterology     Other Visit Diagnoses          Codes    Giant cell tumor of bone    -  Primary D48.0    Encounter for screening for malignant neoplasm of colon     Z12.11    Encounter for screening mammogram for malignant neoplasm of breast     Z12.31    Relevant Orders    BI mammo bilateral screening tomosynthesis    Dizziness     R42    Relevant Medications    meclizine (Antivert) 25 mg tablet    Routine general medical examination at health care facility     Z00.00    Relevant Orders    1 Year Follow Up In Primary Care - Wellness Exam    1 Year Follow Up In Primary Care - Wellness Exam          L leg pain.   S/p L patholoogical distal femur Fx.  S/p 11/16/2024  ORIF distal femur.  Followed by DR Jj Guillen, next visit 11/2024.  Had open Bx L distal femur lesion, curettage as well as plate fixation, in addition to cement 11/2023.  Has Giant cell tumor L femur.   MRI 10/21/2024 was negative.    -Hyperlipidemia.  Keep Mediterranean diet, exercise, weight loss.  Repeat lipid panel today.  Last CT cardiac score 2017, was 2.     -Gastroesophageal reflux disease.  Eat small portions.  Avoid Caffeine, spicy foods, chocolate, alcohol.  Do not wear tight clothes.  Keep last meal before bed time > 3 hours.  Keep head side of the bed elevated at all time.     S/p A bronchitis.   Treated with steroids, antibiotics.  Improved.     H of  UTI.  Treated with Cipro.  Recovered.     - Hyperlipidemia.  Diet, exercise.  Keep Mediterranean diet.     -Degenerative joint disease.  Arthralgia.  Take Tylenol as needed.  Exercise.     -History of latent TB.  Treated with INH.     -Health maintenance.  Medicare wellness annual exam is  today.  DEXA scan, susan  8/4/2023.   Referral for mammography.     - Class 1 Obesity with 31.31.  Diet, exercise.  Keep ideal BMI less than 25.

## 2024-10-29 ENCOUNTER — HOSPITAL ENCOUNTER (OUTPATIENT)
Dept: RADIOLOGY | Facility: CLINIC | Age: 74
Discharge: HOME | End: 2024-10-29
Payer: MEDICARE

## 2024-10-29 ENCOUNTER — EVALUATION (OUTPATIENT)
Dept: PHYSICAL THERAPY | Facility: CLINIC | Age: 74
End: 2024-10-29
Payer: MEDICARE

## 2024-10-29 DIAGNOSIS — R10.9 ABDOMINAL PAIN, UNSPECIFIED ABDOMINAL LOCATION: Primary | ICD-10-CM

## 2024-10-29 DIAGNOSIS — M79.605 ACUTE LEG PAIN, LEFT: Primary | ICD-10-CM

## 2024-10-29 DIAGNOSIS — S72.90XA CLOSED FRACTURE OF FEMUR, UNSPECIFIED FRACTURE MORPHOLOGY, UNSPECIFIED LATERALITY, UNSPECIFIED PORTION OF FEMUR, INITIAL ENCOUNTER (MULTI): ICD-10-CM

## 2024-10-29 DIAGNOSIS — M79.605 PAIN OF LEFT LOWER EXTREMITY: ICD-10-CM

## 2024-10-29 DIAGNOSIS — M62.81 MUSCLE WEAKNESS ON EXAMINATION: ICD-10-CM

## 2024-10-29 DIAGNOSIS — M79.605 PAIN OF LEFT LOWER EXTREMITY: Primary | ICD-10-CM

## 2024-10-29 DIAGNOSIS — R26.2 DIFFICULTY WALKING: ICD-10-CM

## 2024-10-29 PROCEDURE — 93971 EXTREMITY STUDY: CPT

## 2024-10-29 PROCEDURE — 93971 EXTREMITY STUDY: CPT | Performed by: RADIOLOGY

## 2024-10-29 PROCEDURE — 97162 PT EVAL MOD COMPLEX 30 MIN: CPT | Mod: GP

## 2024-10-29 ASSESSMENT — ENCOUNTER SYMPTOMS
DEPRESSION: 0
OCCASIONAL FEELINGS OF UNSTEADINESS: 0
LOSS OF SENSATION IN FEET: 0

## 2024-10-30 ENCOUNTER — APPOINTMENT (OUTPATIENT)
Dept: ORTHOPEDIC SURGERY | Facility: CLINIC | Age: 74
End: 2024-10-30
Payer: MEDICARE

## 2024-10-31 ENCOUNTER — APPOINTMENT (OUTPATIENT)
Dept: RADIOLOGY | Facility: CLINIC | Age: 74
End: 2024-10-31
Payer: MEDICARE

## 2024-11-15 ENCOUNTER — HOSPITAL ENCOUNTER (OUTPATIENT)
Dept: RADIOLOGY | Facility: CLINIC | Age: 74
Discharge: HOME | End: 2024-11-15
Payer: MEDICARE

## 2024-11-15 VITALS — HEIGHT: 66 IN | WEIGHT: 194 LBS | BODY MASS INDEX: 31.18 KG/M2

## 2024-11-15 DIAGNOSIS — Z12.31 ENCOUNTER FOR SCREENING MAMMOGRAM FOR MALIGNANT NEOPLASM OF BREAST: ICD-10-CM

## 2024-11-15 PROCEDURE — 77067 SCR MAMMO BI INCL CAD: CPT

## 2024-11-20 ENCOUNTER — APPOINTMENT (OUTPATIENT)
Dept: ORTHOPEDIC SURGERY | Facility: CLINIC | Age: 74
End: 2024-11-20
Payer: MEDICARE

## 2024-12-03 ENCOUNTER — TREATMENT (OUTPATIENT)
Dept: PHYSICAL THERAPY | Facility: CLINIC | Age: 74
End: 2024-12-03
Payer: MEDICARE

## 2024-12-03 DIAGNOSIS — R26.2 DIFFICULTY WALKING: ICD-10-CM

## 2024-12-03 DIAGNOSIS — M62.81 MUSCLE WEAKNESS ON EXAMINATION: ICD-10-CM

## 2024-12-03 DIAGNOSIS — M79.605 ACUTE LEG PAIN, LEFT: ICD-10-CM

## 2024-12-03 DIAGNOSIS — S72.90XA CLOSED FRACTURE OF FEMUR, UNSPECIFIED FRACTURE MORPHOLOGY, UNSPECIFIED LATERALITY, UNSPECIFIED PORTION OF FEMUR, INITIAL ENCOUNTER (MULTI): ICD-10-CM

## 2024-12-03 PROCEDURE — 97110 THERAPEUTIC EXERCISES: CPT | Mod: GP

## 2024-12-03 PROCEDURE — 97140 MANUAL THERAPY 1/> REGIONS: CPT | Mod: GP

## 2024-12-03 NOTE — PROGRESS NOTES
Physical Therapy  Physical Therapy Treatment Note    Patient Name: lBanquita Morales  MRN: [id number]  Today's Date: 12/3/2024                    Insurance:  Visit number: 2 of 6  Authorization info: yes  Certification dates:   10/29/24 to 12/27/24    Current Problem  1. Muscle weakness on examination              Referred by: Dr. Wilmer Gardner  General:    Left leg pain   Precautions:     hyperlipidemia, degenerative joint disease, gastroesophageal reflux disease, hearing loss, carpal tunnel syndrome, psoriasis, history of latent TB, 2022, treated with INH, S/p L patholoogical distal femur Fx, S/p 11/16/2023  ORIF distal femur.      Medical History Form: Reviewed (scanned into chart)          Subjective:     Patient reports clearance with doppler so no blood clot.  She arrives today with her son to assist on her translation.  She reports that she did have a little decrease in left knee swelling, but stepped over the cat yesterday and has an increase in knee pain overall today    Compliance to HEP:    Pain     Posterior knee pain that limits her range  Objective:     L > R knee valgus  Decrease in patellar mobilization in all directions, especially medial and lateral    Passive knee extension semi-firm end feel 25:0 ROM    Seated knee flexion ~ 80 degrees into resistance  Unable to get around the recumbent bike at seat 12    Treatments:   - review of symptoms  - over the edge A/AROM knee flexion  - gentle STM distal to proximal seated and supine with large roll under, mobilization with movement over the edge of the mat, hands on total time for manual therapy 5 min  - prone heel prop x 4 min, with some grade 2-3 A/P joint joints   - A/AROM supine heel slides  - quad set into large bolster 6 secs x 15 reps  - glute set isometric x 6 secs x 15 reps  - recumbent bike rocking at 11 and 12, did get all the way around on 12 increase in pain  - walk in clinic with rolling walker  - review of HEP and guidelines        Assessment:  Pt demonstrates considerable limitation in passive and active mobility of the left knee.  Left knee restrictions has the joint feeling of a very arthritic joint.  She demonstrates decrease in active mobility in both directions that alters her gait pattern.  She demonstrates improvement in active swing with ambulation with rolling walker.  Going to continue to focus on joint mobility and LE strength, if she continues to have this considerable tightness/limitation/pain suggest referral to joint replacement orthopedic to discuss joint health.         Plan: continue to focus on LE mobility, strengthening, and gait training        Goals:    Goals: Set and discussed today     Short term goal:   Patient  will report a decrease in left leg pain to < 6/10.  Pt will active knee flexion to 110 degrees to allow pt to sit in a chair without discomfort.  Pt will improve LE strength to be able to stand on the left LE with partial UE support for > 10 secs.      Long term goal:   The patient will report a decrease in pain at best to 3/10 and at worst to 6/10 to demonstrate improvement in quality of life in 12 weeks.  The patient will demonstrate an increase in MMT to 4/5 grossly in 12 weeks to improve ability to perform functional transfers and stair climbing.   The patient will report an increase in LEFS score to 45/80. Demonstrating a clinically significant change (MCID 9).   Pt will be able to perform reciprocal stair climbing without an increase in reactivity to demonstrate an increase in functional LE strength within 12 weeks.   Pt will be able to perform left SLS for > 20 secs within minimal hand assistance to demonstrate an improvement in LE balance within 12 weeks.   Patient  will report full independence with SHARRON Valladares, PT

## 2024-12-12 ENCOUNTER — TREATMENT (OUTPATIENT)
Dept: PHYSICAL THERAPY | Facility: CLINIC | Age: 74
End: 2024-12-12
Payer: MEDICARE

## 2024-12-12 DIAGNOSIS — M79.605 ACUTE LEG PAIN, LEFT: ICD-10-CM

## 2024-12-12 DIAGNOSIS — R26.2 DIFFICULTY WALKING: ICD-10-CM

## 2024-12-12 DIAGNOSIS — S72.90XA CLOSED FRACTURE OF FEMUR, UNSPECIFIED FRACTURE MORPHOLOGY, UNSPECIFIED LATERALITY, UNSPECIFIED PORTION OF FEMUR, INITIAL ENCOUNTER (MULTI): ICD-10-CM

## 2024-12-12 DIAGNOSIS — M62.81 MUSCLE WEAKNESS ON EXAMINATION: ICD-10-CM

## 2024-12-12 PROCEDURE — 97140 MANUAL THERAPY 1/> REGIONS: CPT | Mod: GP

## 2024-12-12 PROCEDURE — 97110 THERAPEUTIC EXERCISES: CPT | Mod: GP

## 2024-12-12 NOTE — PROGRESS NOTES
Physical Therapy  Physical Therapy Treatment Note    Patient Name: Blanquita Morales  MRN: [id number]  Today's Date: 12/12/2024  Time Calculation  Start Time: 1156  Stop Time: 1234  Time Calculation (min): 38 min    PT Therapeutic Procedures Time Entry  Manual Therapy Time Entry: 8  Therapeutic Exercise Time Entry: 30            Insurance:  Visit number: 3 of 6  Authorization info: yes  Certification dates:   10/29/24 to 12/27/24    Current Problem  1. Muscle weakness on examination  Follow Up In Physical Therapy      2. Difficulty walking  Follow Up In Physical Therapy      3. Closed fracture of femur, unspecified fracture morphology, unspecified laterality, unspecified portion of femur, initial encounter (Multi)  Follow Up In Physical Therapy      4. Acute leg pain, left  Follow Up In Physical Therapy            Referred by: Dr. Wilmer Gardner  General:    Left leg pain   Precautions:     hyperlipidemia, degenerative joint disease, gastroesophageal reflux disease, hearing loss, carpal tunnel syndrome, psoriasis, history of latent TB, 2022, treated with INH, S/p L patholoogical distal femur Fx, S/p 11/16/2023  ORIF distal femur.      Medical History Form: Reviewed (scanned into chart)          Subjective:     Patient reports clearance with doppler so no blood clot.  She arrives today with her son to assist on her translation.  She reports that she did have a little decrease in left knee swelling, but stepped over the cat yesterday and has an increase in knee pain overall today    Compliance to HEP:    Pain     Posterior knee pain that limits her range  Objective:     L > R knee valgus  Decrease in patellar mobilization in all directions, especially medial and lateral    Passive knee extension semi-firm end feel 25:0 ROM    Seated knee flexion ~ 80 degrees into resistance  Unable to get around the recumbent bike at seat 12    Treatments:   - review of symptoms  - over the edge A/AROM knee flexion  - gentle STM distal  to proximal seated and supine with large roll under, mobilization with movement over the edge of the mat, hands on total time for manual therapy 5 min  - prone heel prop x 4 min, with some grade 2-3 A/P joint joints   - A/AROM supine heel slides  - quad set into large bolster 6 secs x 15 reps  - A/AROM knee flexion over arm x 15 reps  - seated LAQ x 15 reps  - walk with the rolling walker x in clinic  - standing knee flexion stretch at stair hold and relax  - review of HEP and guidelines       Assessment:    Pt was able to tolerate the session ok. She has increase in irritability with the stretching.  She is able to tolerate the the seated knee flexion stretch better than the supine stretch.  She is limited in active mobility with loaded and unloaded activities     Plan: continue to focus on LE mobility, strengthening, and gait training        Goals:    Goals: Set and discussed today     Short term goal:   Patient  will report a decrease in left leg pain to < 6/10.  Pt will active knee flexion to 110 degrees to allow pt to sit in a chair without discomfort.  Pt will improve LE strength to be able to stand on the left LE with partial UE support for > 10 secs.      Long term goal:   The patient will report a decrease in pain at best to 3/10 and at worst to 6/10 to demonstrate improvement in quality of life in 12 weeks.  The patient will demonstrate an increase in MMT to 4/5 grossly in 12 weeks to improve ability to perform functional transfers and stair climbing.   The patient will report an increase in LEFS score to 45/80. Demonstrating a clinically significant change (MCID 9).   Pt will be able to perform reciprocal stair climbing without an increase in reactivity to demonstrate an increase in functional LE strength within 12 weeks.   Pt will be able to perform left SLS for > 20 secs within minimal hand assistance to demonstrate an improvement in LE balance within 12 weeks.   Patient  will report full independence  with HEP        Ifrah Valladares, PT

## 2024-12-24 ENCOUNTER — APPOINTMENT (OUTPATIENT)
Dept: PHYSICAL THERAPY | Facility: CLINIC | Age: 74
End: 2024-12-24
Payer: MEDICARE

## 2024-12-24 DIAGNOSIS — M62.81 MUSCLE WEAKNESS ON EXAMINATION: ICD-10-CM

## 2025-01-22 ENCOUNTER — APPOINTMENT (OUTPATIENT)
Dept: PRIMARY CARE | Facility: CLINIC | Age: 75
End: 2025-01-22
Payer: MEDICARE

## 2025-01-22 DIAGNOSIS — Z00.00 ROUTINE GENERAL MEDICAL EXAMINATION AT HEALTH CARE FACILITY: ICD-10-CM

## 2025-01-22 DIAGNOSIS — R73.9 HYPERGLYCEMIA: ICD-10-CM

## 2025-01-22 DIAGNOSIS — E55.9 VITAMIN D DEFICIENCY: ICD-10-CM

## 2025-01-22 DIAGNOSIS — R53.83 OTHER FATIGUE: ICD-10-CM

## 2025-01-22 DIAGNOSIS — E78.5 HYPERLIPIDEMIA, UNSPECIFIED HYPERLIPIDEMIA TYPE: ICD-10-CM

## 2025-01-22 DIAGNOSIS — E53.8 VITAMIN B12 DEFICIENCY: ICD-10-CM

## 2025-01-22 DIAGNOSIS — K21.00 GASTROESOPHAGEAL REFLUX DISEASE WITH ESOPHAGITIS, UNSPECIFIED WHETHER HEMORRHAGE: ICD-10-CM

## 2025-01-22 DIAGNOSIS — R53.83 MALAISE AND FATIGUE: ICD-10-CM

## 2025-01-22 DIAGNOSIS — R53.81 MALAISE AND FATIGUE: ICD-10-CM

## 2025-01-22 DIAGNOSIS — D50.9 IRON DEFICIENCY ANEMIA, UNSPECIFIED IRON DEFICIENCY ANEMIA TYPE: Primary | ICD-10-CM

## 2025-01-22 PROCEDURE — 82306 VITAMIN D 25 HYDROXY: CPT | Performed by: INTERNAL MEDICINE

## 2025-01-22 PROCEDURE — 85025 COMPLETE CBC W/AUTO DIFF WBC: CPT | Performed by: INTERNAL MEDICINE

## 2025-01-22 PROCEDURE — 80053 COMPREHEN METABOLIC PANEL: CPT | Performed by: INTERNAL MEDICINE

## 2025-01-22 ASSESSMENT — PAIN SCALES - GENERAL: PAINLEVEL_OUTOF10: 4

## 2025-01-22 NOTE — PROGRESS NOTES
Subjective   Patient ID: Blanquita Morales is a 74 y.o. female who presents for follow up.  Has L leg pain.  Having heartburns.  Has L leg pain, unable to ambulate freely.  Has low energy.  Weak.    HPI     This is a 74 years old [place]-speaking lady with medical history of hyperlipidemia, degenerative joint disease, gastroesophageal reflux disease, hearing loss, carpal tunnel syndrome, psoriasis, history of latent TB,  2022, treated with INH, S/p L patholoogical distal femur Fx, S/p 11/16/2024  ORIF distal femur.  Patient is  presented for  evaluation and treatment of the above complaints.  Has L leg pain, followed by orthopedic surgery.  Has constant pain, will start physical therapy.  Has been having difficulty to change her position, unable to exercise.  Patient is upset, crying.  Has episodes of dizziness, lightheadedness.    Review of Systems   Constitutional:  Positive for activity change and fatigue.   Musculoskeletal:  Positive for arthralgias and back pain.       Objective   /62   Pulse 64   Temp 36 °C (96.8 °F) (Temporal)   Resp 16   Wt 90.7 kg (200 lb)   BMI 32.28 kg/m²     Physical Exam  HENT:      Head: Normocephalic.      Right Ear: Tympanic membrane normal.      Nose: Nose normal.   Cardiovascular:      Rate and Rhythm: Normal rate and regular rhythm.   Pulmonary:      Breath sounds: Normal breath sounds.   Musculoskeletal:         General: Tenderness present.   Skin:     General: Skin is warm.   Neurological:      General: No focal deficit present.      Mental Status: She is alert.         Assessment/Plan   Problem List Items Addressed This Visit             ICD-10-CM    Anemia - Primary D64.9    Fatigue R53.83    Relevant Orders    CBC w/5 Part Differential, [facility] (Completed)    GERD (gastroesophageal reflux disease) K21.9    Relevant Medications    omeprazole (PriLOSEC) 20 mg tablet,delayed release (DR/EC) EC tablet    Hyperlipidemia E78.5    Relevant Orders    Comprehensive  Metabolic Panel (Completed)    Malaise and fatigue R53.81, R53.83    Relevant Orders    Thyroid Stimulating Hormone (Completed)    Vitamin D deficiency E55.9    Relevant Orders    Vitamin D 25-Hydroxy,Total (for eval of Vitamin D levels) (Completed)     Other Visit Diagnoses         Codes    Routine general medical examination at health care facility     Z00.00    Hyperglycemia     R73.9    Relevant Orders    Hemoglobin A1C (Completed)    Vitamin B12 deficiency     E53.8    Relevant Orders    Vitamin B12 (Completed)          -Gastroesophageal reflux disease.  Eat small portions.  Avoid Caffeine, spicy foods, chocolate, alcohol.  Do not wear tight clothes.  Keep last meal before bed time > 3 hours.  Keep head side of the bed elevated at all time.   EGD 3/2025.     L leg pain.   S/p L patholoogical distal femur Fx.  S/p 11/16/2024  ORIF distal femur.  Followed by DR Jj Guillen, next visit  4/2025.  Had open Bx L distal femur lesion, curettage as well as plate fixation, in addition to cement 11/2023.  Has Giant cell tumor L femur.   MRI 10/21/2024 was negative.     -Hyperlipidemia.  Keep Mediterranean diet, exercise, weight loss.  Repeat lipid panel today.  Last CT cardiac score 2017, was 2.      S/p A bronchitis.   Treated with steroids, antibiotics.  Improved.     H of  UTI.  Treated with Cipro.  Recovered.     - Hyperlipidemia.  Diet, exercise.  Keep Mediterranean diet.     -Degenerative joint disease.  Arthralgia.  Take Tylenol as needed.  Exercise.     -History of latent TB.  Treated with INH.     -Health maintenance.  Medicare wellness annual exam is up to date.  DEXA scan, cologuard  8/4/2023.  Mammography 11/2024.     - Class 1 Obesity with 32.28.  Diet, exercise.  Keep ideal BMI less than 25.

## 2025-01-23 ENCOUNTER — TELEPHONE (OUTPATIENT)
Dept: PRIMARY CARE | Facility: CLINIC | Age: 75
End: 2025-01-23
Payer: MEDICARE

## 2025-01-23 VITALS
WEIGHT: 200 LBS | RESPIRATION RATE: 16 BRPM | HEART RATE: 64 BPM | BODY MASS INDEX: 32.28 KG/M2 | TEMPERATURE: 96.8 F | SYSTOLIC BLOOD PRESSURE: 122 MMHG | DIASTOLIC BLOOD PRESSURE: 62 MMHG

## 2025-01-23 DIAGNOSIS — R10.9 ABDOMINAL PAIN, UNSPECIFIED ABDOMINAL LOCATION: Primary | ICD-10-CM

## 2025-01-23 DIAGNOSIS — E53.8 VITAMIN B12 DEFICIENCY: Primary | ICD-10-CM

## 2025-01-23 DIAGNOSIS — E53.8 VITAMIN B12 DEFICIENCY: ICD-10-CM

## 2025-01-23 LAB
25(OH)D3 SERPL-MCNC: 49 NG/ML (ref 30–100)
ALBUMIN SERPL BCP-MCNC: 3.8 G/DL (ref 3.4–5)
ALP SERPL-CCNC: 144 U/L (ref 45–117)
ALT SERPL W P-5'-P-CCNC: 53 U/L (ref 16–63)
ANION GAP SERPL CALC-SCNC: 18 MMOL/L (ref 10–20)
AST SERPL W P-5'-P-CCNC: 38 U/L (ref 15–37)
BASOPHILS # BLD AUTO: 0.02 X10*3/UL (ref 0.1–1.6)
BASOPHILS NFR BLD AUTO: 0.35 % (ref 0–0.3)
BILIRUB SERPL-MCNC: 0.2 MG/DL (ref 0.2–1)
BUN SERPL-MCNC: 16 MG/DL (ref 7–18)
CALCIUM SERPL-MCNC: 9.8 MG/DL (ref 8.5–10.1)
CHLORIDE SERPL-SCNC: 104 MMOL/L (ref 98–107)
CO2 SERPL-SCNC: 25 MMOL/L (ref 21–32)
CREAT SERPL-MCNC: 0.56 MG/DL (ref 0.6–1.1)
EGFRCR SERPLBLD CKD-EPI 2021: >90 ML/MIN/1.73M*2
EOSINOPHIL # BLD AUTO: 0.12 X10*3/UL (ref 0.04–0.5)
EOSINOPHIL NFR BLD AUTO: 2.46 % (ref 0.7–7)
ERYTHROCYTE [DISTWIDTH] IN BLOOD BY AUTOMATED COUNT: 15.9 % (ref 11.5–14.5)
GLUCOSE SERPL-MCNC: 96 MG/DL (ref 74–100)
HBA1C MFR BLD: 5.7 %
HCT VFR BLD AUTO: 38 % (ref 36.6–46.6)
HGB BLD-MCNC: 11.82 G/DL (ref 12–15.4)
LYMPHOCYTES # BLD AUTO: 1.29 X10*3/UL (ref 0–6)
LYMPHOCYTES NFR BLD AUTO: 25.98 % (ref 20.5–51.1)
MCH RBC QN AUTO: 26.9 PG (ref 26–32)
MCHC RBC AUTO-ENTMCNC: 31.1 G/DL (ref 31–38)
MCV RBC AUTO: 86.5 FL (ref 80–96)
MONOCYTES # BLD AUTO: 0.4 X10*3/UL (ref 1.6–24.9)
MONOCYTES NFR BLD AUTO: 8.11 % (ref 1.7–9.3)
NEUTROPHILS # BLD AUTO: 3.12 X10*3/UL (ref 1.4–6.5)
NEUTROPHILS NFR BLD AUTO: 63.1 % (ref 42.2–75.2)
PLATELET # BLD AUTO: 253.1 X10*3/UL (ref 150–450)
PMV BLD AUTO: 10.4 FL (ref 7.8–11)
POTASSIUM SERPL-SCNC: 4.5 MMOL/L (ref 3.5–5.1)
PROT SERPL-MCNC: 7.1 G/DL (ref 6.4–8.2)
RBC # BLD AUTO: 4.39 X10*6/UL (ref 3.9–5.3)
SODIUM SERPL-SCNC: 142 MMOL/L (ref 136–145)
TSH SERPL-ACNC: 0.02 MIU/L (ref 0.44–3.98)
VIT B12 SERPL-MCNC: 94 PG/ML (ref 193–986)
WBC # BLD AUTO: 4.95 X10*3/UL (ref 4.5–10.5)

## 2025-01-23 RX ORDER — SYRINGE,SAFETY WITH NEEDLE,1ML 25GX1"
SYRINGE (EA) MISCELLANEOUS
Qty: 12 EACH | Refills: 1 | Status: SHIPPED | OUTPATIENT
Start: 2025-01-23 | End: 2026-01-23

## 2025-01-23 RX ORDER — LANOLIN ALCOHOL/MO/W.PET/CERES
1000 CREAM (GRAM) TOPICAL DAILY
Qty: 90 TABLET | Refills: 2 | Status: SHIPPED | OUTPATIENT
Start: 2025-01-23 | End: 2025-04-23

## 2025-01-23 RX ORDER — ISOPROPYL ALCOHOL 70 ML/100ML
SWAB TOPICAL
Qty: 40 EACH | Refills: 2 | Status: SHIPPED | OUTPATIENT
Start: 2025-01-23

## 2025-01-23 RX ORDER — CYANOCOBALAMIN 1000 UG/ML
INJECTION, SOLUTION INTRAMUSCULAR; SUBCUTANEOUS
Qty: 21 ML | Refills: 1 | Status: SHIPPED | OUTPATIENT
Start: 2025-01-23

## 2025-01-23 RX ORDER — PHENOL/SODIUM PHENOLATE
20 AEROSOL, SPRAY (ML) MUCOUS MEMBRANE DAILY
Qty: 90 TABLET | Refills: 3 | Status: SHIPPED | OUTPATIENT
Start: 2025-01-23 | End: 2025-04-23

## 2025-01-23 ASSESSMENT — ENCOUNTER SYMPTOMS
ACTIVITY CHANGE: 1
ARTHRALGIAS: 1
BACK PAIN: 1
FATIGUE: 1

## 2025-01-23 NOTE — TELEPHONE ENCOUNTER
Pt got a phone call from you that she needs vitamin B12, the daughter- Liz can give her shots, so please send to pharmacy and she can start giving them to her mom, thank you.

## 2025-01-31 ENCOUNTER — HOSPITAL ENCOUNTER (OUTPATIENT)
Dept: RADIOLOGY | Facility: CLINIC | Age: 75
Discharge: HOME | End: 2025-01-31
Payer: MEDICARE

## 2025-01-31 DIAGNOSIS — R10.9 ABDOMINAL PAIN, UNSPECIFIED ABDOMINAL LOCATION: ICD-10-CM

## 2025-01-31 PROCEDURE — 76700 US EXAM ABDOM COMPLETE: CPT

## 2025-02-04 ENCOUNTER — APPOINTMENT (OUTPATIENT)
Dept: GASTROENTEROLOGY | Facility: CLINIC | Age: 75
End: 2025-02-04
Payer: MEDICARE

## 2025-02-25 ENCOUNTER — APPOINTMENT (OUTPATIENT)
Dept: PRIMARY CARE | Facility: CLINIC | Age: 75
End: 2025-02-25
Payer: MEDICARE

## 2025-03-05 ENCOUNTER — HOSPITAL ENCOUNTER (OUTPATIENT)
Dept: RADIOLOGY | Facility: CLINIC | Age: 75
Discharge: HOME | End: 2025-03-05
Payer: MEDICARE

## 2025-03-05 ENCOUNTER — OFFICE VISIT (OUTPATIENT)
Dept: ORTHOPEDIC SURGERY | Facility: CLINIC | Age: 75
End: 2025-03-05
Payer: MEDICARE

## 2025-03-05 VITALS — WEIGHT: 198 LBS | HEIGHT: 66 IN | BODY MASS INDEX: 31.82 KG/M2

## 2025-03-05 DIAGNOSIS — D48.0 GIANT CELL TUMOR OF BONE: ICD-10-CM

## 2025-03-05 PROCEDURE — 20610 DRAIN/INJ JOINT/BURSA W/O US: CPT | Mod: LT | Performed by: STUDENT IN AN ORGANIZED HEALTH CARE EDUCATION/TRAINING PROGRAM

## 2025-03-05 PROCEDURE — 73560 X-RAY EXAM OF KNEE 1 OR 2: CPT | Mod: LT

## 2025-03-05 PROCEDURE — 71046 X-RAY EXAM CHEST 2 VIEWS: CPT

## 2025-03-05 PROCEDURE — 2500000004 HC RX 250 GENERAL PHARMACY W/ HCPCS (ALT 636 FOR OP/ED): Performed by: STUDENT IN AN ORGANIZED HEALTH CARE EDUCATION/TRAINING PROGRAM

## 2025-03-05 PROCEDURE — 99214 OFFICE O/P EST MOD 30 MIN: CPT | Mod: 25 | Performed by: STUDENT IN AN ORGANIZED HEALTH CARE EDUCATION/TRAINING PROGRAM

## 2025-03-05 RX ORDER — TRIAMCINOLONE ACETONIDE 40 MG/ML
2.5 INJECTION, SUSPENSION INTRA-ARTICULAR; INTRAMUSCULAR
Status: COMPLETED | OUTPATIENT
Start: 2025-03-05 | End: 2025-03-05

## 2025-03-05 RX ORDER — LIDOCAINE HYDROCHLORIDE 20 MG/ML
2 INJECTION, SOLUTION INFILTRATION; PERINEURAL
Status: COMPLETED | OUTPATIENT
Start: 2025-03-05 | End: 2025-03-05

## 2025-03-05 RX ADMIN — LIDOCAINE HYDROCHLORIDE 2 ML: 20 INJECTION, SOLUTION INFILTRATION; PERINEURAL at 10:49

## 2025-03-05 RX ADMIN — TRIAMCINOLONE ACETONIDE 2.5 MG: 40 INJECTION, SUSPENSION INTRA-ARTICULAR; INTRAMUSCULAR at 10:49

## 2025-03-05 ASSESSMENT — PATIENT HEALTH QUESTIONNAIRE - PHQ9
2. FEELING DOWN, DEPRESSED OR HOPELESS: NOT AT ALL
SUM OF ALL RESPONSES TO PHQ9 QUESTIONS 1 AND 2: 0
1. LITTLE INTEREST OR PLEASURE IN DOING THINGS: NOT AT ALL

## 2025-03-05 ASSESSMENT — ENCOUNTER SYMPTOMS
OCCASIONAL FEELINGS OF UNSTEADINESS: 1
LOSS OF SENSATION IN FEET: 0
DEPRESSION: 0

## 2025-03-05 ASSESSMENT — PAIN SCALES - GENERAL: PAINLEVEL_OUTOF10: 9

## 2025-03-05 ASSESSMENT — PAIN - FUNCTIONAL ASSESSMENT: PAIN_FUNCTIONAL_ASSESSMENT: 0-10

## 2025-03-05 NOTE — PROGRESS NOTES
[facility]thopa[facility] Surgery Clinic Progress Note:    CC: Follow-up giant cell tumor bone, pathologic fracture left distal femur    S: 74 y.o. 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  She was treated with open biopsy, curettage as well as plate fixation in addition to cement in November 2023.  She is accompanied by her daughter who states that she has really been struggling with doing her therapy and exercises and feels that her knee is gotten more stiff.  When we saw her last we did get an MRI of the left knee which did not show any concern for recurrence and for that reason she was placed back on the 6-month surveillance schedule.  She is here today 1 month early because she is continue to have pain.  She states it feels like the knee gives way.  Denies a mechanical symptoms.  Also feels that she is having increasing swelling in the back of the knee.      Objective:    Exam:  Gen: alert, appropriately conversational, no apparent distress  Chest: symmetric chest rise, non-labored breathing  Heart: RRR to peripheral palpation    Physical exam of the left lower extremity shows healed surgical incisions with no evidence of erythema or drainage. She lacks 3 degrees of terminal extension and can flex to approximately 100 degrees. Sensation is intact light touch distally in all distributions and she has palpable pulses and brisk capillary refill distally. EHL, dorsiflexion and plantarflexion are intact.  She does not appear to have any obvious soft tissue mass and there is no obvious swelling or effusion.  Mild medial and lateral joint line pain and no palpable mass in the posterior aspect of the knee.    Imaging:  Radiographs obtained the left knee were obtained today and individually reviewed by myself which show stable orthopedic fixation as well as good cement placement.  There is no evidence of lucency around the cement mantle to suggest local recurrence.  MRI from  5 months ago does not demonstrate any obvious evidence of recurrence either in the bone or soft tissue recurrence..    Patient ID: Blanquita Morales is a 74 y.o. female.    L Inj/Asp: L knee on 3/5/2025 10:49 AM  Indications: pain  Details: 22 G needle, anterolateral approach  Medications: 2.5 mg triamcinolone acetonide 40 mg/mL; 2 mL lidocaine 20 mg/mL (2 %)  Outcome: tolerated well, no immediate complications  Procedure, treatment alternatives, risks and benefits explained, specific risks discussed. Consent was given by the patient. Immediately prior to procedure a time out was called to verify the correct patient, procedure, equipment, support staff and site/side marked as required. Patient was prepped and draped in the usual sterile fashion.       A/P:  Pathologic fracture through a left distal femur giant cell tumor of bone s/p curretage, ORIF and cement placement November 2023    We discussed options moving forward.  On her MRI did not appreciate any recurrence and currently radiographically everything still looks very good.  For that reason I do not think I would go to advanced imaging given that her most recent imaging was still negative.  She still due for chest x-ray for that reason she will get 1 on her way out today.  We discussed that her knee pain could be coming from other reasons that are not tumor related such as her degenerative joint disease or potentially degenerative meniscal pathology.  Because of that we discussed some conservative management options for her knee pain in general including anti-inflammatories, continued PT as well as injections.  Ultimately that she did decide she would like to move forward with an injection today which was provided.  She will continue with her surveillance for her giant cell tumor and see us in 6 months with repeat left knee films as well as a chest radiograph.

## 2025-03-06 DIAGNOSIS — D48.0 GIANT CELL TUMOR OF BONE: Primary | ICD-10-CM

## 2025-03-10 ENCOUNTER — OFFICE VISIT (OUTPATIENT)
Dept: GASTROENTEROLOGY | Facility: CLINIC | Age: 75
End: 2025-03-10
Payer: MEDICARE

## 2025-03-10 VITALS
BODY MASS INDEX: 33.11 KG/M2 | DIASTOLIC BLOOD PRESSURE: 83 MMHG | WEIGHT: 206 LBS | HEIGHT: 66 IN | HEART RATE: 77 BPM | SYSTOLIC BLOOD PRESSURE: 135 MMHG | TEMPERATURE: 98.2 F

## 2025-03-10 DIAGNOSIS — K21.00 GASTROESOPHAGEAL REFLUX DISEASE WITH ESOPHAGITIS, UNSPECIFIED WHETHER HEMORRHAGE: ICD-10-CM

## 2025-03-10 DIAGNOSIS — K76.0 HEPATIC STEATOSIS: Primary | ICD-10-CM

## 2025-03-10 PROCEDURE — 1159F MED LIST DOCD IN RCRD: CPT | Performed by: STUDENT IN AN ORGANIZED HEALTH CARE EDUCATION/TRAINING PROGRAM

## 2025-03-10 PROCEDURE — 99214 OFFICE O/P EST MOD 30 MIN: CPT | Performed by: STUDENT IN AN ORGANIZED HEALTH CARE EDUCATION/TRAINING PROGRAM

## 2025-03-10 PROCEDURE — 99204 OFFICE O/P NEW MOD 45 MIN: CPT | Performed by: STUDENT IN AN ORGANIZED HEALTH CARE EDUCATION/TRAINING PROGRAM

## 2025-03-10 PROCEDURE — 3075F SYST BP GE 130 - 139MM HG: CPT | Performed by: STUDENT IN AN ORGANIZED HEALTH CARE EDUCATION/TRAINING PROGRAM

## 2025-03-10 PROCEDURE — 1123F ACP DISCUSS/DSCN MKR DOCD: CPT | Performed by: STUDENT IN AN ORGANIZED HEALTH CARE EDUCATION/TRAINING PROGRAM

## 2025-03-10 PROCEDURE — 3079F DIAST BP 80-89 MM HG: CPT | Performed by: STUDENT IN AN ORGANIZED HEALTH CARE EDUCATION/TRAINING PROGRAM

## 2025-03-10 RX ORDER — PANTOPRAZOLE SODIUM 40 MG/1
40 TABLET, DELAYED RELEASE ORAL
Qty: 30 TABLET | Refills: 2 | Status: SHIPPED | OUTPATIENT
Start: 2025-03-10 | End: 2025-06-08

## 2025-03-10 RX ORDER — FAMOTIDINE 40 MG/1
40 TABLET, FILM COATED ORAL NIGHTLY
Qty: 30 TABLET | Refills: 3 | Status: SHIPPED | OUTPATIENT
Start: 2025-03-10 | End: 2025-07-08

## 2025-03-10 NOTE — PROGRESS NOTES
"REASON FOR VISIT:  heartburn    HPI:  Blanquita Morales is a 74 y.o. female Brazilian-speaking female with hx of HLD, DJD, GERD, hearing loss, carpal tunnel syndrome, psoriasis, latent TB (treated with INH in 2022), s/p L pathological distal femur fx, s/p 11/16/2024 ORIF distal femur, nephrolithiasis, hepatic steatosis (US 1/2025), afib, Meniere syndrome, who presents for GERD.     Today, she reports a burning sensation in the mid abdomen that occurs worse with bending over. Pain eases on its own within 1 hour. This is similar to her prior episodes of reflux, however has worsened over the past 3-4 months. No clear trigger for worsening symptoms. She has few daytime symptoms, her reflux is mostly worse at night. Will wake her up from sleep, occurs approx 1x/night, lasts 1 hour in duration. Takes omeprazole a hour before breakfast, this is no longer helping however used to help in the past. Denies any late night snacking, last meal at 6 pm. Worse with lying flat. Food triggers include chocolate. Drinks a cup of coffee daily, also worsens symptoms. Sleeps on one pillows. Denies abdominal pain, dysphagia, n/v, early satiety, weight loss, no melena or hematochezia.   BMs are normal. +borboygmi.    Med list notable for omeprazole 20 mg.    Labs notable for 1/2025 cbc hb bl ~11. Iron studies/ferritin 6/2024 were normal. Vit B12 94. <132<155. ALT wnl AST 38.  RUQ us 1/2025: hepatis steatosis and sludge. MR abd 2018 with 1.1 and 0.7 cm hepatic hemangiomas.     No fhx of GI cancers. No tobacco or alcohol use.     Prev endoscopic eval:   Cologuard negative in 2023.   EGD many years ago for unclear indication, negative per patient.   Colonoscopy many years ago for hematochezia, \"bleeding blood vessels that were treated with cautery.\"     REVIEW OF SYSTEMS  CONSTITUTIONAL: Negative for fevers  HEENT: Negative for frequent/significant headaches  RESPIRATORY: Negative for hemoptysis  CARDIOVASCULAR: Negative for chest pain  GI: " See HPI  : Negative for hematuria  MUSCULOSKELETAL: Negative for arthralgia or myalgia  INTEGUMENTARY/SKIN: Negative for rash or skin lesion  HEMATOLOGY/LYMPHOLOGY: Negative for prolonged bleeding  ENDOCRINE: Negative for cold/heat intolerance  NEURO: Negative for encephalopathy  PSYCH: Negative for new changes in mood or affect    Allergies   Allergen Reactions    Amoxicillin Unknown     CAPS    Penicillins Unknown       Past Medical History:   Diagnosis Date    Abnormal levels of other serum enzymes 08/31/2016    Abnormal liver enzymes    Acute maxillary sinusitis, unspecified 11/30/2016    Acute non-recurrent maxillary sinusitis    Acute pharyngitis, unspecified 05/06/2015    Sore throat    Cardiomegaly 08/31/2016    Enlarged LA (left atrium)    Encounter for gynecological examination (general) (routine) without abnormal findings 08/31/2016    Encounter for routine gynecological examination    Infectious gastroenteritis and colitis, unspecified 06/23/2017    Diarrhea of infectious origin    Medial epicondylitis, left elbow 01/23/2017    Epicondylitis elbow, medial, left    Nausea 09/02/2014    Nausea    Otalgia, bilateral 05/06/2015    Otalgia of both ears    Other abnormal and inconclusive findings on diagnostic imaging of breast 08/31/2016    Abnormal mammogram    Other conditions influencing health status     Normal endoscopy    Palpitations 08/31/2016    Heart palpitations    Paroxysmal atrial fibrillation (Multi) 05/29/2020    Paroxysmal atrial fibrillation    Personal history of other diseases of the circulatory system 08/31/2016    History of angina pectoris    Personal history of other diseases of the circulatory system 08/31/2016    History of atrial fibrillation    Personal history of other diseases of the digestive system     History of gastrointestinal hemorrhage    Personal history of other diseases of the digestive system 03/30/2015    History of rectal bleeding    Personal history of other  diseases of the nervous system and sense organs 11/30/2016    History of serous otitis media    Personal history of other diseases of the respiratory system 11/30/2016    History of acute bronchitis    Personal history of other diseases of the respiratory system 07/01/2015    History of sore throat    Personal history of other diseases of the respiratory system 05/06/2015    History of streptococcal pharyngitis    Personal history of other diseases of the respiratory system 08/31/2016    History of acute pharyngitis    Personal history of other infectious and parasitic diseases 08/31/2016    History of candidiasis of mouth    Personal history of other medical treatment 01/19/2016    History of screening mammography    Personal history of other specified conditions     History of dizziness    Right upper quadrant pain 06/19/2017    Abdominal pain, right upper quadrant    Superficial mycosis, unspecified 06/23/2017    Fungal dermatitis       No past surgical history on file.    Family History   Problem Relation Name Age of Onset    Diabetes Mother      Stroke Mother      Heart attack Father         Social History     Tobacco Use    Smoking status: Never     Passive exposure: Never    Smokeless tobacco: Never   Substance Use Topics    Alcohol use: Never       Current Outpatient Medications   Medication Sig Dispense Refill    calcium carbonate-vitamin D3 (Calcium 600 + D,3,) 600 mg-10 mcg (400 unit) tablet Take 1 tablet by mouth once daily.      cholecalciferol (Vitamin D3) 5,000 Units tablet Take 1 tablet (5,000 Units) by mouth once daily.      cyanocobalamin (Vitamin B-12) 1,000 mcg tablet Take 1 tablet (1,000 mcg) by mouth once daily. 90 tablet 2    cyanocobalamin (Vitamin B-12) 1,000 mcg/mL injection Take 1000 Mcg IM daily for 7 days, then take 1000 mcg weekly # 4 weeks, then take 1000 Mcg every month 21 mL 1    ketoconazole (NIZOral) 2 % shampoo Apply shampoo, lather, leave on 3-5 minutes rinse off 120 mL 11     "meclizine (Antivert) 25 mg tablet Take twice a day , as needed for dizziness 30 tablet 1    omeprazole (PriLOSEC) 20 mg tablet,delayed release (DR/EC) EC tablet Take 1 tablet (20 mg) by mouth once daily. 90 tablet 3    alcohol swabs (Alcohol Pads) pads, medicated Use with injections topically 40 each 2    famotidine (Pepcid) 40 mg tablet Take 1 tablet (40 mg) by mouth once daily at bedtime. 30 tablet 3    fluocinolone (Derma-Smoothe/FS Body Oil) 0.01 % external oil Apply once a day to scalp leave on overnight or at least 4 hours before washing out 118 mL 3    insulin syringe-needle U-100 31G X 5/16\" 1 mL syringe Use to inject  vit b12 as directed 12 each 1    pantoprazole (ProtoNix) 40 mg EC tablet Take 1 tablet (40 mg) by mouth once daily in the evening.  Take before meals. Do not crush, chew, or split. 30 tablet 2     No current facility-administered medications for this visit.       PHYSICAL EXAM:  /83   Pulse 77   Temp 36.8 °C (98.2 °F)   Ht 1.676 m (5' 6\")   Wt 93.4 kg (206 lb)   BMI 33.25 kg/m²      Gen: aaox3, NAD  Head: Normocephalic, atraumatic  Eyes: Sclera anicteric, conjunctiva pink   Neck: Supple  Heart: RRR, no murmurs, rubs or gallops  Lungs: CTAB, non-labored breathing  Abd: Soft, non-distended, mid epigastric tenderness, bowel sounds present, no rebound or guarding, no organomegaly  Ext: No LE edema b/l  Neuro: no gross focal deficits  Psych: Congruent mood and affect, appropriate insight and judgement  Skin: No jaundice              No lab exists for component: \"BILIT\"      No results found.    Lab Results   Component Value Date    WBC 4.95 01/22/2025    HGB 11.82 (L) 01/22/2025    MCV 86.5 01/22/2025    .1 01/22/2025     Lab Results   Component Value Date     01/22/2025    K 4.5 01/22/2025     01/22/2025    CO2 25 01/22/2025    BUN 16 01/22/2025    CREATININE 0.56 (L) 01/22/2025    CREATININE 0.38 (L) 11/17/2023     Lab Results   Component Value Date    ALT 53 " "01/22/2025    AST 38 (H) 01/22/2025     (H) 08/24/2018    ALKPHOS 144 (H) 01/22/2025     No results found for: \"TBILIHCVFS\", \"BILIDIR\"     US ABDOMEN 1/31/2025 for abdominal pain  1. No acute abnormality in the visualized portions of the abdomen.  2. Hepatomegaly with a slightly heterogenous echotexture could be due  to underlying steatosis. Advise biochemical correlation.  3. Gallbladder sludge/stones without acute changes.      === 11/15/23 ===    CT CHEST ABDOMEN PELVIS W IV CONTRAST    - Impression -  BOWEL: Stomach is under distended. Fatty infiltration within the  gastric wall likely sequela of remote gastritis. Visualized loops of  bowel are without evidence for obstruction. Scattered colonic  diverticula without evidence for acute diverticulitis. Normal  appendix. PERITONEUM: No ascites or free air, no fluid collection.      ABDOMINAL WALL: Small umbilical hernia contains fat. Injection  granulomas noted in the right gluteal fat. BONES: No acute osseous  abnormality.      IMPRESSION:  No adenopathy in the chest, abdomen or pelvis.      Bibasilar airspace opacities may relate to atelectasis. Superimposed  infection not excluded. Correlate clinically.      There is a punctate 2 mm nonobstructing calculus in the upper pole of  the right kidney.      Scattered colonic diverticula without evidence for acute  diverticulitis.    ASSESSMENT  GERD  Hepatic steatosis    Long-standing GERD with recent worsening over the past 3-4 months with unclear trigger, no longer responding to ppi. Noctural sx most bothersome, plan to transition ppi and endoscopic evaluation. Discussed lifestyle modifications for reflux. With regards to her fatty liver, AST mildly elevated at 38.  Modifiable risk factors include HLD and BMI. FIB-4 1.55. Check viral hep panel and would benefit from fibroscan. Fractionate alk phos.     PLAN  - EGD  - change to pantoprazole 40 mg 1 hour before dinner  - start pepcid before bedtime  --antireflux " measures (avoid trigger foods, bed elevation, upright after meals, avoid late night snacking, weight loss)  - viral hep panel / repeat LFTs q6months  - will likely need eventual fibroscan, pending repeat LFTs  - fractionate alk phos  - may benefit for therapy for HLD, defer to PCP  - HCV screening, as above  - susan 2023    FU 3-4 months    Date: 3/10/2025  Time: 4:16 PM     Shirley Culp MD

## 2025-03-12 ENCOUNTER — TELEPHONE (OUTPATIENT)
Dept: PRIMARY CARE | Facility: CLINIC | Age: 75
End: 2025-03-12
Payer: MEDICARE

## 2025-03-16 ENCOUNTER — HOSPITAL ENCOUNTER (OUTPATIENT)
Dept: RADIOLOGY | Facility: HOSPITAL | Age: 75
Discharge: HOME | End: 2025-03-16
Payer: MEDICARE

## 2025-03-16 DIAGNOSIS — D48.0 GIANT CELL TUMOR OF BONE: ICD-10-CM

## 2025-03-16 PROCEDURE — A9575 INJ GADOTERATE MEGLUMI 0.1ML: HCPCS | Performed by: STUDENT IN AN ORGANIZED HEALTH CARE EDUCATION/TRAINING PROGRAM

## 2025-03-16 PROCEDURE — 73723 MRI JOINT LWR EXTR W/O&W/DYE: CPT | Mod: LT

## 2025-03-16 PROCEDURE — 2550000001 HC RX 255 CONTRASTS: Performed by: STUDENT IN AN ORGANIZED HEALTH CARE EDUCATION/TRAINING PROGRAM

## 2025-03-16 RX ORDER — GADOTERATE MEGLUMINE 376.9 MG/ML
18 INJECTION INTRAVENOUS
Status: COMPLETED | OUTPATIENT
Start: 2025-03-16 | End: 2025-03-16

## 2025-03-16 RX ADMIN — GADOTERATE MEGLUMINE 18 ML: 376.9 INJECTION INTRAVENOUS at 10:25

## 2025-03-18 ENCOUNTER — APPOINTMENT (OUTPATIENT)
Dept: GASTROENTEROLOGY | Facility: CLINIC | Age: 75
End: 2025-03-18
Payer: MEDICARE

## 2025-03-19 ENCOUNTER — TELEMEDICINE (OUTPATIENT)
Dept: ORTHOPEDIC SURGERY | Facility: CLINIC | Age: 75
End: 2025-03-19
Payer: MEDICARE

## 2025-03-19 ENCOUNTER — TELEPHONE (OUTPATIENT)
Dept: PRIMARY CARE | Facility: CLINIC | Age: 75
End: 2025-03-19

## 2025-03-19 VITALS — WEIGHT: 200 LBS | BODY MASS INDEX: 31.39 KG/M2 | HEIGHT: 67 IN

## 2025-03-19 PROCEDURE — 1123F ACP DISCUSS/DSCN MKR DOCD: CPT | Performed by: STUDENT IN AN ORGANIZED HEALTH CARE EDUCATION/TRAINING PROGRAM

## 2025-03-19 PROCEDURE — 99213 OFFICE O/P EST LOW 20 MIN: CPT | Performed by: STUDENT IN AN ORGANIZED HEALTH CARE EDUCATION/TRAINING PROGRAM

## 2025-03-19 PROCEDURE — G2211 COMPLEX E/M VISIT ADD ON: HCPCS | Performed by: STUDENT IN AN ORGANIZED HEALTH CARE EDUCATION/TRAINING PROGRAM

## 2025-03-19 PROCEDURE — 1159F MED LIST DOCD IN RCRD: CPT | Performed by: STUDENT IN AN ORGANIZED HEALTH CARE EDUCATION/TRAINING PROGRAM

## 2025-03-19 PROCEDURE — 1036F TOBACCO NON-USER: CPT | Performed by: STUDENT IN AN ORGANIZED HEALTH CARE EDUCATION/TRAINING PROGRAM

## 2025-03-19 PROCEDURE — 1160F RVW MEDS BY RX/DR IN RCRD: CPT | Performed by: STUDENT IN AN ORGANIZED HEALTH CARE EDUCATION/TRAINING PROGRAM

## 2025-03-19 ASSESSMENT — ENCOUNTER SYMPTOMS
DEPRESSION: 0
OCCASIONAL FEELINGS OF UNSTEADINESS: 1
LOSS OF SENSATION IN FEET: 0

## 2025-03-19 ASSESSMENT — PAIN - FUNCTIONAL ASSESSMENT: PAIN_FUNCTIONAL_ASSESSMENT: NO/DENIES PAIN

## 2025-03-19 NOTE — PROGRESS NOTES
[facility] Progress Note:    CC: Follow-up giant cell tumor bone, pathologic fracture left distal femur    S: 74 y.o. 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  She was treated with open biopsy, curettage as well as plate fixation in addition to cement in November 2023.  Will be most recently saw her she was having continued left knee pain and for that reason we did perform an intra-articular injection which she states lasted her a couple days in terms of relief.  Her pain has come back at this point.  While my impression from the radiographs were that I saw no evidence of tumor recurrence and the radiologist felt that there was possibly decreased density in the medial condyle which could represent tumor recurrence.  I spoke with him and despite her disagreement we agreed that getting an MRI could potentially see if there is any tumor in the region.  For that reason an MRI was ordered and she is here with her daughter to go over those results.    Objective:    Exam:  Gen: alert, appropriately conversational, no apparent distress    Physical exam is unable to be performed due to the virtual nature of the visit but patient is overall well-appearing without apparent distress.    Imaging:  Radiographs obtained the left knee were obtained today and individually reviewed by myself which show stable orthopedic fixation as well as good cement placement.  There is no evidence of lucency around the cement mantle to suggest local recurrence.  Chest radiograph showed no evidence of distant disease.  MRI does not demonstrate any obvious evidence of recurrence either in the bone or soft tissue recurrence although this is somewhat limited by the metal obviously.    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  Given that her pain  got significantly better even though temporarily with an intra-articular injection I told her this is most likely pain coming from within her knee joint.  It be very unlikely that an intra-articular injection would make tumoral pain from the bone better.  Although the MRI is limited because of the metal we do not see any obvious signs of recurrence.  We did discuss that we could consider CT scan as a final option if she was still concerned however they stated that they want to hold off on any additional imaging at this time the one to try continue conservative management.  For that reason we recommended continued anti-inflammatory use as well as physical therapy.  She already has an appointment to see us back in early September for continued surveillance.  I would like to see her back with left knee films as well as a chest x-ray at that scheduled visit.

## 2025-04-01 ENCOUNTER — APPOINTMENT (OUTPATIENT)
Dept: GASTROENTEROLOGY | Facility: HOSPITAL | Age: 75
End: 2025-04-01
Payer: MEDICARE

## 2025-04-02 ENCOUNTER — APPOINTMENT (OUTPATIENT)
Dept: ORTHOPEDIC SURGERY | Facility: CLINIC | Age: 75
End: 2025-04-02
Payer: MEDICARE

## 2025-04-16 ENCOUNTER — APPOINTMENT (OUTPATIENT)
Dept: ORTHOPEDIC SURGERY | Facility: CLINIC | Age: 75
End: 2025-04-16
Payer: MEDICARE

## 2025-04-24 ENCOUNTER — APPOINTMENT (OUTPATIENT)
Dept: PRIMARY CARE | Facility: CLINIC | Age: 75
End: 2025-04-24
Payer: MEDICARE

## 2025-04-24 VITALS
RESPIRATION RATE: 16 BRPM | HEIGHT: 67 IN | DIASTOLIC BLOOD PRESSURE: 62 MMHG | HEART RATE: 62 BPM | SYSTOLIC BLOOD PRESSURE: 106 MMHG | TEMPERATURE: 97.5 F | BODY MASS INDEX: 31.86 KG/M2 | WEIGHT: 203 LBS

## 2025-04-24 DIAGNOSIS — E55.9 VITAMIN D DEFICIENCY: ICD-10-CM

## 2025-04-24 DIAGNOSIS — R53.81 MALAISE AND FATIGUE: ICD-10-CM

## 2025-04-24 DIAGNOSIS — E78.5 HYPERLIPIDEMIA, UNSPECIFIED HYPERLIPIDEMIA TYPE: ICD-10-CM

## 2025-04-24 DIAGNOSIS — R53.83 OTHER FATIGUE: ICD-10-CM

## 2025-04-24 DIAGNOSIS — E53.8 VITAMIN B12 DEFICIENCY: ICD-10-CM

## 2025-04-24 DIAGNOSIS — D64.9 ANEMIA, UNSPECIFIED TYPE: ICD-10-CM

## 2025-04-24 DIAGNOSIS — K76.0 FATTY LIVER DISEASE, NONALCOHOLIC: Primary | ICD-10-CM

## 2025-04-24 DIAGNOSIS — R53.83 MALAISE AND FATIGUE: ICD-10-CM

## 2025-04-24 DIAGNOSIS — R73.9 HYPERGLYCEMIA: ICD-10-CM

## 2025-04-24 PROBLEM — I48.0 PAROXYSMAL ATRIAL FIBRILLATION (MULTI): Status: RESOLVED | Noted: 2025-04-24 | Resolved: 2025-04-24

## 2025-04-24 PROBLEM — I48.0 PAROXYSMAL ATRIAL FIBRILLATION (MULTI): Status: ACTIVE | Noted: 2025-04-24

## 2025-04-24 LAB
25(OH)D3 SERPL-MCNC: 50 NG/ML (ref 30–100)
ALBUMIN SERPL BCP-MCNC: 3.7 G/DL (ref 3.4–5)
ALP SERPL-CCNC: 174 U/L (ref 45–117)
ALT SERPL W P-5'-P-CCNC: 79 U/L (ref 16–63)
ANION GAP SERPL CALC-SCNC: 13 MMOL/L (ref 10–20)
AST SERPL W P-5'-P-CCNC: 39 U/L (ref 15–37)
BILIRUB SERPL-MCNC: 0.5 MG/DL (ref 0.2–1)
BUN SERPL-MCNC: 15 MG/DL (ref 7–18)
CALCIUM SERPL-MCNC: 9.5 MG/DL (ref 8.5–10.1)
CHLORIDE SERPL-SCNC: 105 MMOL/L (ref 98–107)
CHOLEST SERPL-MCNC: 283 MG/DL (ref 0–199)
CHOLESTEROL/HDL RATIO: 3.7 (ref 4.2–7)
CO2 SERPL-SCNC: 28 MMOL/L (ref 21–32)
CREAT SERPL-MCNC: 0.45 MG/DL (ref 0.6–1.1)
EGFRCR SERPLBLD CKD-EPI 2021: >90 ML/MIN/1.73M*2
GLUCOSE SERPL-MCNC: 89 MG/DL (ref 74–100)
HBA1C MFR BLD: 5.3 %
HDLC SERPL-MCNC: 77 MG/DL (ref 40–59)
IS PATIENT FASTING: YES
LDLC SERPL DIRECT ASSAY-MCNC: 170 MG/DL (ref 0–100)
POTASSIUM SERPL-SCNC: 4.1 MMOL/L (ref 3.5–5.1)
PROT SERPL-MCNC: 7.1 G/DL (ref 6.4–8.2)
SODIUM SERPL-SCNC: 142 MMOL/L (ref 136–145)
TRIGL SERPL-MCNC: 85 MG/DL
TSH SERPL-ACNC: 1.74 MIU/L (ref 0.44–3.98)
VIT B12 SERPL-MCNC: 743 PG/ML (ref 193–986)

## 2025-04-24 PROCEDURE — 82306 VITAMIN D 25 HYDROXY: CPT | Performed by: INTERNAL MEDICINE

## 2025-04-24 PROCEDURE — 1123F ACP DISCUSS/DSCN MKR DOCD: CPT | Performed by: INTERNAL MEDICINE

## 2025-04-24 PROCEDURE — 80061 LIPID PANEL: CPT | Performed by: INTERNAL MEDICINE

## 2025-04-24 PROCEDURE — 99213 OFFICE O/P EST LOW 20 MIN: CPT | Performed by: INTERNAL MEDICINE

## 2025-04-24 PROCEDURE — 80053 COMPREHEN METABOLIC PANEL: CPT | Performed by: INTERNAL MEDICINE

## 2025-04-24 PROCEDURE — 1159F MED LIST DOCD IN RCRD: CPT | Performed by: INTERNAL MEDICINE

## 2025-04-24 PROCEDURE — 83036 HEMOGLOBIN GLYCOSYLATED A1C: CPT | Performed by: INTERNAL MEDICINE

## 2025-04-24 PROCEDURE — 3078F DIAST BP <80 MM HG: CPT | Performed by: INTERNAL MEDICINE

## 2025-04-24 PROCEDURE — 3074F SYST BP LT 130 MM HG: CPT | Performed by: INTERNAL MEDICINE

## 2025-04-24 PROCEDURE — 82607 VITAMIN B-12: CPT | Performed by: INTERNAL MEDICINE

## 2025-04-24 PROCEDURE — 1160F RVW MEDS BY RX/DR IN RCRD: CPT | Performed by: INTERNAL MEDICINE

## 2025-04-24 PROCEDURE — G2211 COMPLEX E/M VISIT ADD ON: HCPCS | Performed by: INTERNAL MEDICINE

## 2025-04-24 PROCEDURE — 84443 ASSAY THYROID STIM HORMONE: CPT | Performed by: INTERNAL MEDICINE

## 2025-04-24 ASSESSMENT — ENCOUNTER SYMPTOMS
BLOOD IN STOOL: 0
BACK PAIN: 1
CONSTIPATION: 0
DIARRHEA: 0

## 2025-04-24 NOTE — PROGRESS NOTES
"Subjective   Patient ID: Blanquita Morales is a 74 y.o. female who presents for follow up.  Has L leg pain.  Unable to ambulate.    HPI     This is a 74 years old [place]ian-speaking lady with medical history of hyperlipidemia, degenerative joint disease, gastroesophageal reflux disease, hearing loss, carpal tunnel syndrome, psoriasis, history of latent TB,  2022, treated with INH, S/p L patholoogical distal femur Fx, S/p 11/16/2024  ORIF distal femur.  Patient is  presented for  evaluation and treatment of the above complaints.  Has L leg pain, followed by orthopedic surgery, in need to repeat MRI.  Has difficulty to ambulate.  Has been having difficulty to change her position, unable to exercise.  Patient is upset, crying.  Has episodes of dizziness, lightheadedness.     Review of Systems   Gastrointestinal:  Negative for blood in stool, constipation and diarrhea.   Musculoskeletal:  Positive for back pain and gait problem.       Objective   /62   Pulse 62   Temp 36.4 °C (97.5 °F) (Temporal)   Resp 16   Ht 1.702 m (5' 7\")   Wt 92.1 kg (203 lb)   BMI 31.79 kg/m²     Physical Exam  Constitutional:       Appearance: She is obese.   HENT:      Head: Normocephalic and atraumatic.      Nose: Nose normal.   Eyes:      Pupils: Pupils are equal, round, and reactive to light.   Cardiovascular:      Rate and Rhythm: Normal rate and regular rhythm.      Heart sounds: Normal heart sounds.   Pulmonary:      Breath sounds: Normal breath sounds.   Abdominal:      Palpations: Abdomen is soft.   Musculoskeletal:         General: Tenderness present.   Skin:     General: Skin is warm.   Neurological:      Mental Status: Mental status is at baseline.   Psychiatric:         Mood and Affect: Mood normal.         Assessment/Plan   Problem List Items Addressed This Visit           ICD-10-CM    Anemia D64.9    Fatigue R53.83    Fatty liver disease, nonalcoholic - Primary K76.0    Relevant Orders    Alkaline Phosphatase, Isoenzymes    " Hepatitis A Antibody, Total    Hepatitis B Surface Antibody    Hepatitis B Surface Antigen    Hepatitis C Antibody    Hyperlipidemia E78.5    Relevant Orders    Comprehensive Metabolic Panel    Lipid Panel    Malaise and fatigue R53.81, R53.83    Relevant Orders    Thyroid Stimulating Hormone    Vitamin D deficiency E55.9    Relevant Orders    Vitamin D 25-Hydroxy,Total (for eval of Vitamin D levels)     Other Visit Diagnoses         Codes      Hyperglycemia     R73.9    Relevant Orders    Hemoglobin A1C      Vitamin B12 deficiency     E53.8    Relevant Orders    Vitamin B12            Gastroesophageal reflux disease.  Eat small portions.  Avoid Caffeine, spicy foods, chocolate, alcohol.  Do not wear tight clothes.  Keep last meal before bed time > 3 hours.  Keep head side of the bed elevated at all time.   EGD 4/30/2025.      L leg pain.   S/p L patholoogical distal femur Fx.  S/p 11/16/2024  ORIF distal femur.  Followed by DR Jj Guillen.  Had open Bx L distal femur lesion, curettage as well as plate fixation, in addition to cement 11/2023.  Has Giant cell tumor L femur.   MRI 10/21/2024 was negative.  Will repeat MRI.     -Hyperlipidemia.  Keep Mediterranean diet, exercise, weight loss.  Repeat lipid panel today.  Last CT cardiac score 2017, was 2.  Repeat lipid panel.      S/p A bronchitis.   Treated with steroids, antibiotics.  Improved.     H of  UTI.  Treated with Cipro.  Recovered.     -Degenerative joint disease.  Arthralgia.  Take Tylenol as needed.  Exercise.     -History of latent TB.  Treated with INH.     -Health maintenance.  Medicare wellness annual exam is up to date.  DEXA scan, cologuard  8/4/2023.  Mammography 11/2024.     - Class 1 Obesity with 31.71  Diet, exercise.  Keep ideal BMI less than 25.   9/2025.

## 2025-04-25 LAB
ALP BONE CFR SERPL: NORMAL %
ALP INTEST CFR SERPL: NORMAL %
ALP ISOS SERPL-IMP: NORMAL
ALP LIVER CFR SERPL: NORMAL %
ALP MACROHEPATIC CFR SERPL: NORMAL %
ALP PLAC CFR SERPL: NORMAL %
ALP SERPL-CCNC: NORMAL U/L
HAV AB SER QL IA: REACTIVE
HBV SURFACE AB SERPL IA-ACNC: 47 MIU/ML
HBV SURFACE AG SERPL QL IA: NORMAL
HCV AB SERPL QL IA: NORMAL

## 2025-04-28 LAB
ALP BONE CFR SERPL: 34 % (ref 28–66)
ALP INTEST CFR SERPL: 0 % (ref 1–24)
ALP LIVER CFR SERPL: 66 % (ref 25–69)
ALP MACROHEPATIC CFR SERPL: 0 %
ALP PLAC CFR SERPL: 0 %
ALP SERPL-CCNC: 137 U/L (ref 37–153)
HAV AB SER QL IA: REACTIVE
HBV SURFACE AB SERPL IA-ACNC: 47 MIU/ML
HBV SURFACE AG SERPL QL IA: NORMAL
HCV AB SERPL QL IA: NORMAL

## 2025-04-29 ENCOUNTER — ANESTHESIA EVENT (OUTPATIENT)
Dept: GASTROENTEROLOGY | Facility: HOSPITAL | Age: 75
End: 2025-04-29
Payer: MEDICARE

## 2025-04-29 ENCOUNTER — HOSPITAL ENCOUNTER (OUTPATIENT)
Dept: GASTROENTEROLOGY | Facility: HOSPITAL | Age: 75
Discharge: HOME | End: 2025-04-29
Payer: MEDICARE

## 2025-04-29 ENCOUNTER — ANESTHESIA (OUTPATIENT)
Dept: GASTROENTEROLOGY | Facility: HOSPITAL | Age: 75
End: 2025-04-29
Payer: MEDICARE

## 2025-04-29 VITALS
BODY MASS INDEX: 31.87 KG/M2 | OXYGEN SATURATION: 98 % | HEART RATE: 72 BPM | WEIGHT: 203.04 LBS | TEMPERATURE: 96.8 F | SYSTOLIC BLOOD PRESSURE: 131 MMHG | RESPIRATION RATE: 14 BRPM | HEIGHT: 67 IN | DIASTOLIC BLOOD PRESSURE: 92 MMHG

## 2025-04-29 DIAGNOSIS — K21.00 GASTROESOPHAGEAL REFLUX DISEASE WITH ESOPHAGITIS, UNSPECIFIED WHETHER HEMORRHAGE: ICD-10-CM

## 2025-04-29 PROCEDURE — 43239 EGD BIOPSY SINGLE/MULTIPLE: CPT | Performed by: STUDENT IN AN ORGANIZED HEALTH CARE EDUCATION/TRAINING PROGRAM

## 2025-04-29 PROCEDURE — 99100 ANES PT EXTEME AGE<1 YR&>70: CPT | Performed by: ANESTHESIOLOGY

## 2025-04-29 PROCEDURE — A43239 PR EDG TRANSORAL BIOPSY SINGLE/MULTIPLE: Performed by: ANESTHESIOLOGY

## 2025-04-29 PROCEDURE — 3700000001 HC GENERAL ANESTHESIA TIME - INITIAL BASE CHARGE

## 2025-04-29 PROCEDURE — 7100000010 HC PHASE TWO TIME - EACH INCREMENTAL 1 MINUTE

## 2025-04-29 PROCEDURE — 2500000001 HC RX 250 WO HCPCS SELF ADMINISTERED DRUGS (ALT 637 FOR MEDICARE OP): Performed by: ANESTHESIOLOGY

## 2025-04-29 PROCEDURE — 7100000009 HC PHASE TWO TIME - INITIAL BASE CHARGE

## 2025-04-29 PROCEDURE — 3700000002 HC GENERAL ANESTHESIA TIME - EACH INCREMENTAL 1 MINUTE

## 2025-04-29 PROCEDURE — 2500000004 HC RX 250 GENERAL PHARMACY W/ HCPCS (ALT 636 FOR OP/ED): Mod: JZ | Performed by: STUDENT IN AN ORGANIZED HEALTH CARE EDUCATION/TRAINING PROGRAM

## 2025-04-29 RX ORDER — ACETAMINOPHEN 500 MG
TABLET ORAL EVERY 6 HOURS PRN
COMMUNITY

## 2025-04-29 RX ORDER — SODIUM CHLORIDE, SODIUM LACTATE, POTASSIUM CHLORIDE, CALCIUM CHLORIDE 600; 310; 30; 20 MG/100ML; MG/100ML; MG/100ML; MG/100ML
50 INJECTION, SOLUTION INTRAVENOUS CONTINUOUS
Status: ACTIVE | OUTPATIENT
Start: 2025-04-29 | End: 2025-04-29

## 2025-04-29 RX ORDER — LIDOCAINE HYDROCHLORIDE 10 MG/ML
0.1 INJECTION, SOLUTION EPIDURAL; INFILTRATION; INTRACAUDAL; PERINEURAL ONCE
Status: DISCONTINUED | OUTPATIENT
Start: 2025-04-29 | End: 2025-04-30 | Stop reason: HOSPADM

## 2025-04-29 RX ORDER — OXYCODONE HYDROCHLORIDE 5 MG/1
5 TABLET ORAL EVERY 4 HOURS PRN
Status: DISCONTINUED | OUTPATIENT
Start: 2025-04-29 | End: 2025-04-30 | Stop reason: HOSPADM

## 2025-04-29 RX ORDER — DROPERIDOL 2.5 MG/ML
0.62 INJECTION, SOLUTION INTRAMUSCULAR; INTRAVENOUS ONCE AS NEEDED
Status: DISCONTINUED | OUTPATIENT
Start: 2025-04-29 | End: 2025-04-30 | Stop reason: HOSPADM

## 2025-04-29 RX ORDER — ONDANSETRON HYDROCHLORIDE 2 MG/ML
4 INJECTION, SOLUTION INTRAVENOUS ONCE AS NEEDED
Status: DISCONTINUED | OUTPATIENT
Start: 2025-04-29 | End: 2025-04-30 | Stop reason: HOSPADM

## 2025-04-29 RX ORDER — PROPOFOL 10 MG/ML
INJECTION, EMULSION INTRAVENOUS AS NEEDED
Status: DISCONTINUED | OUTPATIENT
Start: 2025-04-29 | End: 2025-04-29

## 2025-04-29 RX ORDER — ACETAMINOPHEN 325 MG/1
650 TABLET ORAL EVERY 4 HOURS PRN
Status: DISCONTINUED | OUTPATIENT
Start: 2025-04-29 | End: 2025-04-30 | Stop reason: HOSPADM

## 2025-04-29 RX ORDER — FENTANYL CITRATE 50 UG/ML
25 INJECTION, SOLUTION INTRAMUSCULAR; INTRAVENOUS EVERY 5 MIN PRN
Status: DISCONTINUED | OUTPATIENT
Start: 2025-04-29 | End: 2025-04-30 | Stop reason: HOSPADM

## 2025-04-29 RX ORDER — LIDOCAINE HYDROCHLORIDE 20 MG/ML
INJECTION, SOLUTION INFILTRATION; PERINEURAL AS NEEDED
Status: DISCONTINUED | OUTPATIENT
Start: 2025-04-29 | End: 2025-04-29

## 2025-04-29 RX ADMIN — PROPOFOL 100 MG: 10 INJECTION, EMULSION INTRAVENOUS at 11:46

## 2025-04-29 RX ADMIN — PROPOFOL 100 MG: 10 INJECTION, EMULSION INTRAVENOUS at 11:43

## 2025-04-29 RX ADMIN — LIDOCAINE HYDROCHLORIDE 3 ML: 20 INJECTION, SOLUTION INFILTRATION; PERINEURAL at 11:36

## 2025-04-29 RX ADMIN — PROPOFOL 100 MG: 10 INJECTION, EMULSION INTRAVENOUS at 11:39

## 2025-04-29 RX ADMIN — PROPOFOL 100 MG: 10 INJECTION, EMULSION INTRAVENOUS at 11:36

## 2025-04-29 RX ADMIN — ACETAMINOPHEN 650 MG: 325 TABLET ORAL at 12:20

## 2025-04-29 SDOH — HEALTH STABILITY: MENTAL HEALTH: CURRENT SMOKER: 0

## 2025-04-29 ASSESSMENT — PAIN - FUNCTIONAL ASSESSMENT
PAIN_FUNCTIONAL_ASSESSMENT: 0-10

## 2025-04-29 ASSESSMENT — ENCOUNTER SYMPTOMS
JOINT SWELLING: 0
ABDOMINAL PAIN: 0
SPEECH DIFFICULTY: 0
CONSTIPATION: 0
ABDOMINAL DISTENTION: 0
UNEXPECTED WEIGHT CHANGE: 0
ARTHRALGIAS: 0
WHEEZING: 0
TROUBLE SWALLOWING: 0
COLOR CHANGE: 0
CHILLS: 0
NAUSEA: 0
COUGH: 0
DIARRHEA: 0
FEVER: 0
DIFFICULTY URINATING: 0
VOMITING: 0
SHORTNESS OF BREATH: 0
CONFUSION: 0
LIGHT-HEADEDNESS: 0
SLEEP DISTURBANCE: 0
HEADACHES: 0
DIZZINESS: 0
BLOOD IN STOOL: 0

## 2025-04-29 ASSESSMENT — PAIN SCALES - GENERAL
PAINLEVEL_OUTOF10: 0 - NO PAIN
PAINLEVEL_OUTOF10: 0 - NO PAIN
PAIN_LEVEL: 2
PAINLEVEL_OUTOF10: 3
PAINLEVEL_OUTOF10: 3
PAINLEVEL_OUTOF10: 0 - NO PAIN

## 2025-04-29 ASSESSMENT — PAIN DESCRIPTION - LOCATION: LOCATION: HEAD

## 2025-04-29 ASSESSMENT — PAIN DESCRIPTION - DESCRIPTORS: DESCRIPTORS: ACHING

## 2025-04-29 NOTE — ANESTHESIA POSTPROCEDURE EVALUATION
Patient: Blanquita Morales    Procedure Summary       Date: 04/29/25 Room / Location: [facility]    Anesthesia Start: 1132 Anesthesia Stop: 1156    Procedure: EGD Diagnosis: Gastroesophageal reflux disease with esophagitis, unspecified whether hemorrhage    Scheduled Providers: Shirley Culp MD; CAIN Gregorio; Miguel Corcoran MD; Rachele Schmidt RN Responsible Provider: Miguel Corcoran MD    Anesthesia Type: MAC ASA Status: 3            Anesthesia Type: MAC    Vitals Value Taken Time   /92 04/29/25 12:23   Temp 36 °C (96.8 °F) 04/29/25 11:53   Pulse 72 04/29/25 12:23   Resp 14 04/29/25 12:23   SpO2 98 % 04/29/25 12:23       Anesthesia Post Evaluation    Patient location during evaluation: PACU  Patient participation: complete - patient participated  Level of consciousness: awake  Pain score: 2  Pain management: adequate  Airway patency: patent  Cardiovascular status: acceptable and stable  Respiratory status: acceptable and room air  Hydration status: acceptable  Postoperative Nausea and Vomiting: none        No notable events documented.

## 2025-04-29 NOTE — H&P
History Of Present Illness  Blanquita Morales is a 74 y.o. female presenting with EGD for GERD.     Past Medical History  Medical History[1]  Surgical History  Surgical History[2]  Social History  She reports that she has never smoked. She has never been exposed to tobacco smoke. She has never used smokeless tobacco. She reports that she does not drink alcohol and does not use drugs.    Family History  Family History[3]     Allergies  Allergies[4]  Review of Systems   Constitutional:  Negative for chills, fever and unexpected weight change.   HENT:  Negative for congestion and trouble swallowing.    Respiratory:  Negative for cough, shortness of breath and wheezing.    Cardiovascular:  Negative for chest pain.   Gastrointestinal:  Negative for abdominal distention, abdominal pain, blood in stool, constipation, diarrhea, nausea and vomiting.   Genitourinary:  Negative for difficulty urinating.   Musculoskeletal:  Negative for arthralgias and joint swelling.   Skin:  Negative for color change.   Neurological:  Negative for dizziness, speech difficulty, light-headedness and headaches.   Psychiatric/Behavioral:  Negative for confusion and sleep disturbance.         Physical Exam  Constitutional:       General: She is awake.      Appearance: Normal appearance.   HENT:      Head: Normocephalic and atraumatic.      Nose: Nose normal.      Mouth/Throat:      Mouth: Mucous membranes are moist.   Eyes:      Pupils: Pupils are equal, round, and reactive to light.   Neck:      Thyroid: No thyroid mass.      Trachea: Phonation normal.   Cardiovascular:      Rate and Rhythm: Normal rate and regular rhythm.   Pulmonary:      Effort: Pulmonary effort is normal. No respiratory distress.      Breath sounds: Normal air entry. No decreased breath sounds, wheezing, rhonchi or rales.   Abdominal:      General: Bowel sounds are normal. There is no distension.      Palpations: Abdomen is soft.      Tenderness: There is no abdominal  "tenderness.   Musculoskeletal:      Cervical back: Neck supple.      Right lower leg: No edema.      Left lower leg: No edema.   Skin:     General: Skin is warm.      Capillary Refill: Capillary refill takes less than 2 seconds.   Neurological:      General: No focal deficit present.      Mental Status: She is alert and oriented to person, place, and time. Mental status is at baseline.      Cranial Nerves: Cranial nerves 2-12 are intact.      Motor: Motor function is intact.   Psychiatric:         Attention and Perception: Attention and perception normal.         Mood and Affect: Mood normal.         Speech: Speech normal.         Behavior: Behavior normal.          Last Recorded Vitals  Blood pressure 157/69, pulse 82, temperature 36.1 °C (97 °F), temperature source Temporal, resp. rate 16, height 1.702 m (5' 7\"), weight 92.1 kg (203 lb 0.7 oz), SpO2 99%.    Assessment/Plan   EGD for GERD.     Shirley Culp MD       [1]   Past Medical History:  Diagnosis Date    Abnormal levels of other serum enzymes 08/31/2016    Abnormal liver enzymes    Acute maxillary sinusitis, unspecified 11/30/2016    Acute non-recurrent maxillary sinusitis    Acute pharyngitis, unspecified 05/06/2015    Sore throat    Cardiomegaly 08/31/2016    Enlarged LA (left atrium)    Encounter for gynecological examination (general) (routine) without abnormal findings 08/31/2016    Encounter for routine gynecological examination    Infectious gastroenteritis and colitis, unspecified 06/23/2017    Diarrhea of infectious origin    Medial epicondylitis, left elbow 01/23/2017    Epicondylitis elbow, medial, left    Nausea 09/02/2014    Nausea    Otalgia, bilateral 05/06/2015    Otalgia of both ears    Other abnormal and inconclusive findings on diagnostic imaging of breast 08/31/2016    Abnormal mammogram    Other conditions influencing health status     Normal endoscopy    Palpitations 08/31/2016    Heart palpitations    Paroxysmal atrial " fibrillation (Multi) 05/29/2020    Paroxysmal atrial fibrillation    Personal history of other diseases of the circulatory system 08/31/2016    History of angina pectoris    Personal history of other diseases of the circulatory system 08/31/2016    History of atrial fibrillation    Personal history of other diseases of the digestive system     History of gastrointestinal hemorrhage    Personal history of other diseases of the digestive system 03/30/2015    History of rectal bleeding    Personal history of other diseases of the nervous system and sense organs 11/30/2016    History of serous otitis media    Personal history of other diseases of the respiratory system 11/30/2016    History of acute bronchitis    Personal history of other diseases of the respiratory system 07/01/2015    History of sore throat    Personal history of other diseases of the respiratory system 05/06/2015    History of streptococcal pharyngitis    Personal history of other diseases of the respiratory system 08/31/2016    History of acute pharyngitis    Personal history of other infectious and parasitic diseases 08/31/2016    History of candidiasis of mouth    Personal history of other medical treatment 01/19/2016    History of screening mammography    Personal history of other specified conditions     History of dizziness    Right upper quadrant pain 06/19/2017    Abdominal pain, right upper quadrant    Superficial mycosis, unspecified 06/23/2017    Fungal dermatitis   [2]   Past Surgical History:  Procedure Laterality Date    KNEE SURGERY Left 2023    giant cell tumor   [3]   Family History  Problem Relation Name Age of Onset    Diabetes Mother      Stroke Mother      Heart attack Father     [4]   Allergies  Allergen Reactions    Amoxicillin Nausea/vomiting    Penicillins Nausea/vomiting

## 2025-04-29 NOTE — ANESTHESIA PREPROCEDURE EVALUATION
Patient: Blanquita Morales    Procedure Information       Date/Time: 04/29/25 1120    Scheduled providers: Shirley Culp MD; CAIN Gregorio; Miguel Corcoran MD    Procedure: EGD    [place] [facility] [place] [facility]            Relevant Problems   Cardiac   (+) Cardiac arrhythmia   (+) Hyperlipidemia   (+) Hypertension      Pulmonary  Social History    Tobacco Use      Smoking status: Never        Passive exposure: Never      Smokeless tobacco: Never          Neuro   (+) Anticipatory anxiety   (+) Anxiety and depression   (+) Impingement of vertebral body syndrome   (+) Lumbar radiculopathy      GI   (+) GERD (gastroesophageal reflux disease)      Liver   (+) Fatty liver disease, nonalcoholic   (+) Hepatic lesion      Endocrine   (+) Class 1 obesity with body mass index (BMI) of 34.0 to 34.9 in adult      Hematology   (+) Anemia      Musculoskeletal   (+) Osteoarthritis of left knee      HEENT   (+) Asymmetrical sensorineural hearing loss   (+) Hearing loss   (+) Sinus disease      ID   (+) Impetigo      Skin   (+) Eczema       Clinical information reviewed:               ALLERGIES:  Allergies[1]     MEDICAL HISTORY:  Medical History[2]     Relevant Problems   Cardiac   (+) Cardiac arrhythmia   (+) Hyperlipidemia   (+) Hypertension      Pulmonary  Social History    Tobacco Use      Smoking status: Never        Passive exposure: Never      Smokeless tobacco: Never          Neuro   (+) Anticipatory anxiety   (+) Anxiety and depression   (+) Impingement of vertebral body syndrome   (+) Lumbar radiculopathy      GI   (+) GERD (gastroesophageal reflux disease)      Liver   (+) Fatty liver disease, nonalcoholic   (+) Hepatic lesion      Endocrine   (+) Class 1 obesity with body mass index (BMI) of 34.0 to 34.9 in adult      Hematology   (+) Anemia      Musculoskeletal   (+) Osteoarthritis of left knee      HEENT   (+) Asymmetrical sensorineural hearing loss   (+) Hearing loss   (+) Sinus disease      ID   (+)  "Impetigo      Skin   (+) Eczema        SURGICAL HISTORY:  Surgical History[3]     MEDICATIONS:  Current Outpatient Medications   Medication Instructions    alcohol swabs (Alcohol Pads) pads, medicated Use with injections topically    calcium carbonate-vitamin D3 (Calcium 600 + D,3,) 600 mg-10 mcg (400 unit) tablet 1 tablet, Daily    cholecalciferol (Vitamin D3) 5,000 Units tablet 1 tablet, Daily    cyanocobalamin (Vitamin B-12) 1,000 mcg/mL injection Take 1000 Mcg IM daily for 7 days, then take 1000 mcg weekly # 4 weeks, then take 1000 Mcg every month    famotidine (PEPCID) 40 mg, oral, Nightly    fluocinolone (Derma-Smoothe/FS Body Oil) 0.01 % external oil Apply once a day to scalp leave on overnight or at least 4 hours before washing out    insulin syringe-needle U-100 31G X 5/16\" 1 mL syringe Use to inject  vit b12 as directed    ketoconazole (NIZOral) 2 % shampoo Apply shampoo, lather, leave on 3-5 minutes rinse off    meclizine (Antivert) 25 mg tablet Take twice a day , as needed for dizziness    omeprazole (PRILOSEC) 20 mg, oral, Daily    pantoprazole (PROTONIX) 40 mg, oral, Daily before evening meal, Do not crush, chew, or split.        VITALS:      4/24/2025     8:58 AM 3/19/2025     8:44 AM 3/16/2025    10:27 AM   Vitals   Systolic 106     Diastolic 62     Heart Rate 62     Temp 36.4 °C (97.5 °F)     Resp 16     Height 1.702 m (5' 7\") 1.702 m (5' 7\") 1.702 m (5' 7\")   Weight (lb) 203 200 200   BMI 31.79 kg/m2 31.32 kg/m2 31.32 kg/m2   BSA (m2) 2.09 m2 2.07 m2 2.07 m2   Visit Report Report         LABS:   [facility]   Lab Results   Component Value Date    GLUCOSE 89 04/24/2025    CALCIUM 9.5 04/24/2025     04/24/2025    K 4.1 04/24/2025    CO2 28 04/24/2025     04/24/2025    BUN 15 04/24/2025    CREATININE 0.45 (L) 04/24/2025   , BMP Extended  Results from last 7 days   Lab Units 04/24/25  1005   SODIUM mmol/L 142   POTASSIUM mmol/L 4.1   CHLORIDE mmol/L 105   CO2 mmol/L 28   BUN mg/dL 15   CREATININE " mg/dL 0.45*   EGFR mL/min/1.73m*2 >90   GLUCOSE mg/dL 89   CALCIUM mg/dL 9.5    , LFT   Lab Results   Component Value Date    ALT 79 (H) 04/24/2025    AST 39 (H) 04/24/2025     (H) 08/24/2018    ALKPHOS 174 (H) 04/24/2025    BILITOT 0.5 04/24/2025   , MELD Computed MELD 3.0 unavailable. One or more values for this score either were not found within the given timeframe or did not fit some other criterion.  Computed MELD-Na unavailable. One or more values for this score either were not found within the given timeframe or did not fit some other criterion.  , CBC  Lab Results   Component Value Date    WBC 4.95 01/22/2025    HGB 11.82 (L) 01/22/2025    HCT 38.0 01/22/2025    MCV 86.5 01/22/2025    .1 01/22/2025          , CBC Extended       Latest Ref Rng & Units 1/22/2025     2:47 PM 11/18/2023    11:29 AM 11/17/2023     7:09 AM 11/16/2023     1:54 AM 11/15/2023     2:54 PM 6/13/2022     9:08 AM 5/4/2022    10:44 AM   CBC   Hb 12.00 - 15.40 g/dL 11.82  10.1  10.1  11.2  11.2     11.2  11.6  11.7    Hct 36.6 - 46.6 % 38.0  32.0  33.6  34.7  35.9     35.9  36.1  38.9    MCV 80.0 - 96.0 fL 86.5  86  89  85  85     85  88  92    RDW 11.5 - 14.5 % 15.9  14.9  15.4  14.8  15.1     15.1  16.3  15.9      , Coags   Lab Results   Component Value Date/Time    PROTIME 13.5 (H) 11/15/2023 1930    INR 1.2 (H) 11/15/2023 1930      , Coags Extendied   , A1C   Lab Results   Component Value Date    HGBA1C 5.3 04/24/2025   , ABG   , ABG Extended          IMAGES:  EKG        No results found for this or any previous visit (from the past 4464 hours).   , ECHO       No results found for this or any previous visit from the past 730 days.    , CARDIAC CATH      No results found for this or any previous visit from the past 730 days.   , CXR       XR chest 2 views 03/05/2025    Narrative  Interpreted By:  Daniel Layton,  STUDY:  XR CHEST 2 VIEWS    INDICATION:  Signs/Symptoms:GCT.    COMPARISON:  October 17, 2024    ACCESSION  NUMBER(S):  [id number]    ORDERING CLINICIAN:  ARPIT MYLES    FINDINGS:  No consolidation, effusion, edema, or pneumothorax. Heart size within  normal limits.    Impression  No evidence of acute intrathoracic abnormality.    Signed by: Daniel Layton 3/6/2025 6:57 PM  Dictation workstation:   [id number]    , CT Head/Neck     No results found for this or any previous visit from the past 760 days.    , CT Chest        CT chest abdomen pelvis w IV contrast 11/15/2023    Narrative  Interpreted By:  Sage Cardenas,  STUDY:  CT CHEST ABDOMEN PELVIS W IV CONTRAST;  11/15/2023 7:55 pm    INDICATION:  Signs/Symptoms:tumor work up.    COMPARISON:  CT scan of 07/11/2017.    ACCESSION NUMBER(S):  [id number]    ORDERING CLINICIAN:  ELÍAS SANDS    TECHNIQUE:  Axial CT images of the chest, abdomen and pelvis with coronal and  sagittal reconstructed images obtained after intravenous  administration of 75 mL of Omnipaque 350.    FINDINGS:  CHEST:    VESSELS: Aorta is normal caliber. Atherosclerotic changes in the  aorta and coronary arteries. HEART: Normal size. No pericardial  effusion. MEDIASTINUM AND SERGEY: No pathologically enlarged thoracic  lymph nodes. LUNG, PLEURA, LARGE AIRWAYS: Bibasilar airspace  opacities may relate to atelectasis. Superimposed infection not  excluded. No effusion or pneumothorax. CHEST WALL AND LOWER NECK:  Heterogeneous thyroid gland with subcentimeter hypodense left thyroid  nodule which is nonspecific. Small hiatal hernia. BONES: Multilevel  degenerative changes of the spine.    ABDOMEN:    LIVER: Hepatomegaly. Normal morphology. Hepatic and portal veins are  patent. Stable calcified focus adjacent to the right hepatic margin  BILE DUCTS: Normal caliber. GALLBLADDER: No calcified gallstones. No  wall thickening. PANCREAS: Mild fatty atrophy of the pancreas.  SPLEEN: Within normal limits. Accessory splenules noted.  ADRENALS: Within normal limits.  KIDNEYS: Symmetric renal enhancement. No  hydronephrosis or  perinephric fluid collection. There is a punctate 2 mm nonobstructing  calculus in the upper pole of the right kidney. URETERS: No  hydroureter.    VESSELS: Calcific atherosclerosis of the aortoiliac vessels. No  aortic aneurysm. RETROPERITONEUM: Within normal limits.    PELVIS:    REPRODUCTIVE ORGANS: Uterus is present. No adnexal mass.  BLADDER: Bladder is under distended with apparent wall thickening.    BOWEL: Stomach is under distended. Fatty infiltration within the  gastric wall likely sequela of remote gastritis. Visualized loops of  bowel are without evidence for obstruction. Scattered colonic  diverticula without evidence for acute diverticulitis. Normal  appendix. PERITONEUM: No ascites or free air, no fluid collection.    ABDOMINAL WALL: Small umbilical hernia contains fat. Injection  granulomas noted in the right gluteal fat. BONES: No acute osseous  abnormality.    Impression  No adenopathy in the chest, abdomen or pelvis.    Bibasilar airspace opacities may relate to atelectasis. Superimposed  infection not excluded. Correlate clinically.    There is a punctate 2 mm nonobstructing calculus in the upper pole of  the right kidney.    Scattered colonic diverticula without evidence for acute  diverticulitis.    Additional findings as described above.    MACRO:  None    Signed by: Sage Cardenas 11/15/2023 8:39 PM  Dictation workstation:   [id number]   , CT Abdomin     No results found for this or any previous visit from the past 730 days.    SOCIAL:  Tobacco Use History[4]   Social History     Substance and Sexual Activity   Alcohol Use Never      Social History     Substance and Sexual Activity   Drug Use Never        NPO STATUS:  No data recorded    Clinical Areas Reviewed:                   Anesthesia Assessment:    Physical Exam    Airway  Mallampati: I  TM distance: >3 FB  Neck ROM: full  Mouth opening: 3 or more finger widths     Cardiovascular   Rhythm: regular  Rate: normal      Dental    Pulmonary - normal examBreath sounds clear to auscultation     Abdominal            Anesthesia Plan    History of general anesthesia?: yes  History of complications of general anesthesia?: no    ASA 3     MAC     The patient is not a current smoker.  Patient did not smoke on day of procedure.    Anesthetic plan and risks discussed with patient.  Use of blood products discussed with patient who.    Plan discussed with CAA.            NPO Detail:  No data recorded          [1]   Allergies  Allergen Reactions    Amoxicillin Unknown     CAPS    Penicillins Unknown   [2]   Past Medical History:  Diagnosis Date    Abnormal levels of other serum enzymes 08/31/2016    Abnormal liver enzymes    Acute maxillary sinusitis, unspecified 11/30/2016    Acute non-recurrent maxillary sinusitis    Acute pharyngitis, unspecified 05/06/2015    Sore throat    Cardiomegaly 08/31/2016    Enlarged LA (left atrium)    Encounter for gynecological examination (general) (routine) without abnormal findings 08/31/2016    Encounter for routine gynecological examination    Infectious gastroenteritis and colitis, unspecified 06/23/2017    Diarrhea of infectious origin    Medial epicondylitis, left elbow 01/23/2017    Epicondylitis elbow, medial, left    Nausea 09/02/2014    Nausea    Otalgia, bilateral 05/06/2015    Otalgia of both ears    Other abnormal and inconclusive findings on diagnostic imaging of breast 08/31/2016    Abnormal mammogram    Other conditions influencing health status     Normal endoscopy    Palpitations 08/31/2016    Heart palpitations    Paroxysmal atrial fibrillation (Multi) 05/29/2020    Paroxysmal atrial fibrillation    Personal history of other diseases of the circulatory system 08/31/2016    History of angina pectoris    Personal history of other diseases of the circulatory system 08/31/2016    History of atrial fibrillation    Personal history of other diseases of the digestive system     History of  gastrointestinal hemorrhage    Personal history of other diseases of the digestive system 03/30/2015    History of rectal bleeding    Personal history of other diseases of the nervous system and sense organs 11/30/2016    History of serous otitis media    Personal history of other diseases of the respiratory system 11/30/2016    History of acute bronchitis    Personal history of other diseases of the respiratory system 07/01/2015    History of sore throat    Personal history of other diseases of the respiratory system 05/06/2015    History of streptococcal pharyngitis    Personal history of other diseases of the respiratory system 08/31/2016    History of acute pharyngitis    Personal history of other infectious and parasitic diseases 08/31/2016    History of candidiasis of mouth    Personal history of other medical treatment 01/19/2016    History of screening mammography    Personal history of other specified conditions     History of dizziness    Right upper quadrant pain 06/19/2017    Abdominal pain, right upper quadrant    Superficial mycosis, unspecified 06/23/2017    Fungal dermatitis   [3]   Past Surgical History:  Procedure Laterality Date    KNEE SURGERY Left 2023    giant cell tumor   [4]   Social History  Tobacco Use   Smoking Status Never    Passive exposure: Never   Smokeless Tobacco Never

## 2025-04-29 NOTE — DISCHARGE INSTRUCTIONS
During the first 24 hours after your procedure, you should:     - Resume normal diet, unless otherwise directed by your doctor.  - Resume your home medications, unless otherwise directed by your doctor.  - Refrain from driving or operative heavy machinery.  - Drink plenty of liquids.  - Avoid consuming alcohol.  - Avoid strenuous activity or heavy lifting.     After 24 hours, you can resume regular activity.     Call your doctor office immediately ([phone]) or come to the nearest emergency room if you experience:     - Abdominal tenderness  - Blood in your stool or vomit  - Difficulty urinating or passing stools  - Difficulty breathing  - Chest pain  - Fever

## 2025-04-30 ASSESSMENT — PAIN SCALES - GENERAL: PAINLEVEL_OUTOF10: 0 - NO PAIN

## 2025-05-09 ENCOUNTER — HOSPITAL ENCOUNTER (OUTPATIENT)
Dept: RADIOLOGY | Facility: HOSPITAL | Age: 75
Discharge: HOME | End: 2025-05-09
Payer: MEDICARE

## 2025-05-09 DIAGNOSIS — D48.0 GIANT CELL TUMOR OF BONE: ICD-10-CM

## 2025-05-09 LAB
LAB AP ASR DISCLAIMER: NORMAL
LABORATORY COMMENT REPORT: NORMAL
PATH REPORT.FINAL DX SPEC: NORMAL
PATH REPORT.GROSS SPEC: NORMAL
PATH REPORT.RELEVANT HX SPEC: NORMAL
PATH REPORT.TOTAL CANCER: NORMAL

## 2025-05-09 PROCEDURE — 2550000001 HC RX 255 CONTRASTS: Mod: JW | Performed by: STUDENT IN AN ORGANIZED HEALTH CARE EDUCATION/TRAINING PROGRAM

## 2025-05-09 PROCEDURE — A9575 INJ GADOTERATE MEGLUMI 0.1ML: HCPCS | Mod: JW | Performed by: STUDENT IN AN ORGANIZED HEALTH CARE EDUCATION/TRAINING PROGRAM

## 2025-05-09 PROCEDURE — 73723 MRI JOINT LWR EXTR W/O&W/DYE: CPT | Mod: LT,RSC

## 2025-05-09 RX ORDER — GADOTERATE MEGLUMINE 376.9 MG/ML
18 INJECTION INTRAVENOUS
Status: COMPLETED | OUTPATIENT
Start: 2025-05-09 | End: 2025-05-09

## 2025-05-09 RX ADMIN — GADOTERATE MEGLUMINE 18 ML: 376.9 INJECTION INTRAVENOUS at 12:05

## 2025-05-10 PROCEDURE — G0179 MD RECERTIFICATION HHA PT: HCPCS | Performed by: INTERNAL MEDICINE

## 2025-05-12 DIAGNOSIS — D48.0 GIANT CELL TUMOR OF BONE: Primary | ICD-10-CM

## 2025-05-13 ENCOUNTER — HOSPITAL ENCOUNTER (OUTPATIENT)
Dept: RADIOLOGY | Facility: CLINIC | Age: 75
Discharge: HOME | End: 2025-05-13
Payer: MEDICARE

## 2025-05-13 DIAGNOSIS — D48.0 GIANT CELL TUMOR OF BONE: ICD-10-CM

## 2025-05-13 PROCEDURE — 2550000001 HC RX 255 CONTRASTS: Performed by: STUDENT IN AN ORGANIZED HEALTH CARE EDUCATION/TRAINING PROGRAM

## 2025-05-13 PROCEDURE — 73701 CT LOWER EXTREMITY W/DYE: CPT | Mod: LT

## 2025-05-13 RX ADMIN — IOHEXOL 65 ML: 350 INJECTION, SOLUTION INTRAVENOUS at 15:21

## 2025-05-14 ENCOUNTER — OFFICE VISIT (OUTPATIENT)
Dept: ORTHOPEDIC SURGERY | Facility: CLINIC | Age: 75
End: 2025-05-14
Payer: MEDICARE

## 2025-05-14 VITALS — WEIGHT: 200 LBS | BODY MASS INDEX: 31.39 KG/M2 | HEIGHT: 67 IN

## 2025-05-14 DIAGNOSIS — D48.0 GIANT CELL TUMOR OF BONE: Primary | ICD-10-CM

## 2025-05-14 PROCEDURE — 1159F MED LIST DOCD IN RCRD: CPT | Performed by: STUDENT IN AN ORGANIZED HEALTH CARE EDUCATION/TRAINING PROGRAM

## 2025-05-14 PROCEDURE — 1160F RVW MEDS BY RX/DR IN RCRD: CPT | Performed by: STUDENT IN AN ORGANIZED HEALTH CARE EDUCATION/TRAINING PROGRAM

## 2025-05-14 PROCEDURE — 1036F TOBACCO NON-USER: CPT | Performed by: STUDENT IN AN ORGANIZED HEALTH CARE EDUCATION/TRAINING PROGRAM

## 2025-05-14 PROCEDURE — 99215 OFFICE O/P EST HI 40 MIN: CPT | Performed by: STUDENT IN AN ORGANIZED HEALTH CARE EDUCATION/TRAINING PROGRAM

## 2025-05-14 PROCEDURE — G2211 COMPLEX E/M VISIT ADD ON: HCPCS | Performed by: STUDENT IN AN ORGANIZED HEALTH CARE EDUCATION/TRAINING PROGRAM

## 2025-05-14 ASSESSMENT — ENCOUNTER SYMPTOMS
LOSS OF SENSATION IN FEET: 0
DEPRESSION: 0
OCCASIONAL FEELINGS OF UNSTEADINESS: 1

## 2025-05-14 ASSESSMENT — PAIN - FUNCTIONAL ASSESSMENT: PAIN_FUNCTIONAL_ASSESSMENT: NO/DENIES PAIN

## 2025-05-14 NOTE — PROGRESS NOTES
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 female who presents with continued left knee pain. Patient had a pathological fracture of her left distal femur that was treated with open biopsy, curettage and plate fixation in November of 2023. At the last visit it was felt that her pain was intra-articular and a left knee injection was done that improved her pain for a couple days.  We ultimately got an MRI to see if there was any concern for local recurrence because there was concern radiographically from her previous visit.  That MRI initially was read as negative however we got a call from the radiology department indicating that they thought the technique could be improved and for that reason a repeat MRI was performed.  This showed a change that looked more consistent with likely local recurrence of her giant cell tumor.  For that reason a CT scan was ordered and she is here for follow-up to discuss results with the assistance of her daughter.    Objective:    Exam:  Gen: alert, appropriately conversational, no apparent distress  Chest: symmetric chest rise, non-labored breathing  Heart: RRR to peripheral palpation    Physical exam of the left lower extremity shows healed surgical incisions with no evidence of erythema or drainage. She lacks 3 degrees of terminal extension and can flex to approximately 100 degrees. Sensation is intact light touch distally in all distributions and she has palpable pulses and brisk capillary refill distally. EHL, dorsiflexion and plantarflexion are intact.  She does not appear to have any obvious soft tissue mass and there is no obvious swelling or effusion.  Mild medial and lateral joint line pain and no palpable mass in the posterior aspect of the knee.     Imaging:  Radiographs obtained the left knee were obtained previously and individually reviewed by myself which show stable  orthopedic fixation as well as good cement placement.  Increased lucency within the medial femoral condyle was concerning for recurrence.  Chest radiograph showed no evidence of distant disease.  Repeat MRI was performed which now shows more consistent evidence of a medullary replacing lesion consistent with likely local recurrence within the medial femoral condyle.  CT scan of the knee was also obtained which shows a thin rim of bone along the weightbearing surface of the medial femoral condyle although there is what appears to be a lack of cortical bone within the area of the notch.  No large appreciable soft tissue mass noted.    A/P:    I discussed with the patient as well as her daughter that imaging characteristics are very consistent with local recurrence and I do not believe a biopsy is needed.  Given the appearance on MRI as well as the findings on CT scan I told her we have 2 options.  The weightbearing portion does appear to have a thin rim of bone and certainly you could consider a joint sparing procedure such as a revision curettage with adjuvant treatment and then packing of cement around the medial femoral condyle.  The biggest risk here would be for additional recurrence and given that this is already recurred once I am certainly concerned about the risk of additional recurrences moving forward and she is already been struggling over the last 12 months.  Alternatively, especially given that we do see some lack of cortical bone within the area of the notch potentially indicative of some joint space invasion, we could consider moving forward with distal femoral resection and distal femoral replacement given her age.  We discussed at length with the risks and benefits are in terms of both approaches.  If she went with a joint sparing approach it likely would be a faster recovery with more minimal surgery however recurrence would be more likely and she may end up with post traumatic arthritis either way.   While resection would not guarantee her a recurrence free future it would be more likely to rid the tumor in its entirety although has a higher risk of potential longer recovery as well as complications associated with the prosthesis.  After discussing both options right now she does think she wants to move forward with distal femoral resection followed by distal femoral replacement although she is michell continue to talk it over with her family.  Risk and benefits of were discussed included but were not limited to risk of infection, blood loss, DVT and pulmonary embolism, failure of surgery need for future revisions, damage local structures, complications result of anesthesia, wound dehiscence, iatrogenic fracture, local recurrence/progression, change in diagnosis final pathology, aseptic loosening, arthrofibrosis, as well as chronic pain.  She was agreeable these risk of the potential benefits is and at this time we will plan on moving forward the above-mentioned procedure.

## 2025-05-16 ENCOUNTER — APPOINTMENT (OUTPATIENT)
Dept: ORTHOPEDIC SURGERY | Facility: HOSPITAL | Age: 75
End: 2025-05-16
Payer: MEDICARE

## 2025-05-19 ENCOUNTER — PREP FOR PROCEDURE (OUTPATIENT)
Dept: ORTHOPEDIC SURGERY | Facility: HOSPITAL | Age: 75
End: 2025-05-19
Payer: MEDICARE

## 2025-05-19 DIAGNOSIS — Z98.890 H/O MAJOR ORTHOPEDIC SURGERY: ICD-10-CM

## 2025-05-19 DIAGNOSIS — D48.0 BONE, GIANT CELL TUMOR: Primary | ICD-10-CM

## 2025-05-19 DIAGNOSIS — T84.9XXA ORTHOPEDIC DEVICE, IMPLANT, OR GRAFT COMPLICATION: ICD-10-CM

## 2025-05-19 RX ORDER — CEFAZOLIN SODIUM 2 G/50ML
2 SOLUTION INTRAVENOUS ONCE
OUTPATIENT
Start: 2025-06-09 | End: 2025-06-09

## 2025-05-22 DIAGNOSIS — R74.01 ELEVATED TRANSAMINASE LEVEL: Primary | ICD-10-CM

## 2025-06-02 ENCOUNTER — PRE-ADMISSION TESTING (OUTPATIENT)
Dept: PREADMISSION TESTING | Facility: HOSPITAL | Age: 75
End: 2025-06-02
Payer: COMMERCIAL

## 2025-06-02 ENCOUNTER — HOSPITAL ENCOUNTER (OUTPATIENT)
Dept: RADIOLOGY | Facility: HOSPITAL | Age: 75
Discharge: HOME | End: 2025-06-02
Payer: COMMERCIAL

## 2025-06-02 VITALS
TEMPERATURE: 97.8 F | HEIGHT: 67 IN | OXYGEN SATURATION: 98 % | HEART RATE: 80 BPM | BODY MASS INDEX: 31.39 KG/M2 | DIASTOLIC BLOOD PRESSURE: 78 MMHG | WEIGHT: 200 LBS | SYSTOLIC BLOOD PRESSURE: 120 MMHG

## 2025-06-02 DIAGNOSIS — T84.9XXA ORTHOPEDIC DEVICE, IMPLANT, OR GRAFT COMPLICATION: ICD-10-CM

## 2025-06-02 DIAGNOSIS — D48.0 GIANT CELL TUMOR OF BONE: ICD-10-CM

## 2025-06-02 DIAGNOSIS — R74.01 ELEVATED TRANSAMINASE LEVEL: ICD-10-CM

## 2025-06-02 DIAGNOSIS — D48.0 BONE, GIANT CELL TUMOR: ICD-10-CM

## 2025-06-02 DIAGNOSIS — Z98.890 H/O MAJOR ORTHOPEDIC SURGERY: ICD-10-CM

## 2025-06-02 LAB
ALBUMIN SERPL BCP-MCNC: 4.4 G/DL (ref 3.4–5)
ALP SERPL-CCNC: 134 U/L (ref 33–136)
ALT SERPL W P-5'-P-CCNC: 37 U/L (ref 7–45)
ANION GAP SERPL CALC-SCNC: 13 MMOL/L (ref 10–20)
APTT PPP: 30 SECONDS (ref 26–36)
AST SERPL W P-5'-P-CCNC: 29 U/L (ref 9–39)
BILIRUB DIRECT SERPL-MCNC: 0 MG/DL (ref 0–0.3)
BILIRUB SERPL-MCNC: 0.3 MG/DL (ref 0–1.2)
BUN SERPL-MCNC: 16 MG/DL (ref 6–23)
CALCIUM SERPL-MCNC: 10 MG/DL (ref 8.6–10.6)
CHLORIDE SERPL-SCNC: 102 MMOL/L (ref 98–107)
CO2 SERPL-SCNC: 27 MMOL/L (ref 21–32)
CREAT SERPL-MCNC: 0.5 MG/DL (ref 0.5–1.05)
EGFRCR SERPLBLD CKD-EPI 2021: >90 ML/MIN/1.73M*2
ERYTHROCYTE [DISTWIDTH] IN BLOOD BY AUTOMATED COUNT: 15.2 % (ref 11.5–14.5)
GLUCOSE SERPL-MCNC: 86 MG/DL (ref 74–99)
HCT VFR BLD AUTO: 40.5 % (ref 36–46)
HGB BLD-MCNC: 12.2 G/DL (ref 12–16)
INR PPP: 1 (ref 0.9–1.1)
MCH RBC QN AUTO: 26.9 PG (ref 26–34)
MCHC RBC AUTO-ENTMCNC: 30.1 G/DL (ref 32–36)
MCV RBC AUTO: 89 FL (ref 80–100)
NRBC BLD-RTO: 0 /100 WBCS (ref 0–0)
PLATELET # BLD AUTO: 234 X10*3/UL (ref 150–450)
POTASSIUM SERPL-SCNC: 4.2 MMOL/L (ref 3.5–5.3)
PROT SERPL-MCNC: 7.2 G/DL (ref 6.4–8.2)
PROTHROMBIN TIME: 10.9 SECONDS (ref 9.8–12.4)
RBC # BLD AUTO: 4.53 X10*6/UL (ref 4–5.2)
SODIUM SERPL-SCNC: 138 MMOL/L (ref 136–145)
WBC # BLD AUTO: 5.2 X10*3/UL (ref 4.4–11.3)

## 2025-06-02 PROCEDURE — 73560 X-RAY EXAM OF KNEE 1 OR 2: CPT | Mod: LEFT SIDE | Performed by: RADIOLOGY

## 2025-06-02 PROCEDURE — 85610 PROTHROMBIN TIME: CPT

## 2025-06-02 PROCEDURE — 99205 OFFICE O/P NEW HI 60 MIN: CPT | Performed by: NURSE PRACTITIONER

## 2025-06-02 PROCEDURE — 93005 ELECTROCARDIOGRAM TRACING: CPT

## 2025-06-02 PROCEDURE — 85027 COMPLETE CBC AUTOMATED: CPT

## 2025-06-02 PROCEDURE — 36415 COLL VENOUS BLD VENIPUNCTURE: CPT

## 2025-06-02 PROCEDURE — 80048 BASIC METABOLIC PNL TOTAL CA: CPT

## 2025-06-02 PROCEDURE — 71046 X-RAY EXAM CHEST 2 VIEWS: CPT

## 2025-06-02 PROCEDURE — 73560 X-RAY EXAM OF KNEE 1 OR 2: CPT | Mod: LT

## 2025-06-02 PROCEDURE — 71046 X-RAY EXAM CHEST 2 VIEWS: CPT | Performed by: RADIOLOGY

## 2025-06-02 ASSESSMENT — DUKE ACTIVITY SCORE INDEX (DASI)
CAN YOU DO YARD WORK LIKE RAKING LEAVES, WEEDING OR PUSHING A MOWER: NO
CAN YOU HAVE SEXUAL RELATIONS: YES
CAN YOU TAKE CARE OF YOURSELF (EAT, DRESS, BATHE, OR USE TOILET): YES
TOTAL_SCORE: 23.95
CAN YOU WALK A BLOCK OR TWO ON LEVEL GROUND: NO
DASI METS SCORE: 5.7
CAN YOU WALK INDOORS, SUCH AS AROUND YOUR HOUSE: YES
CAN YOU DO LIGHT WORK AROUND THE HOUSE LIKE DUSTING OR WASHING DISHES: YES
CAN YOU CLIMB A FLIGHT OF STAIRS OR WALK UP A HILL: NO
CAN YOU RUN A SHORT DISTANCE: NO
CAN YOU PARTICIPATE IN MODERATE RECREATIONAL ACTIVITIES LIKE GOLF, BOWLING, DANCING, DOUBLES TENNIS OR THROWING A BASEBALL OR FOOTBALL: NO
CAN YOU DO HEAVY WORK AROUND THE HOUSE LIKE SCRUBBING FLOORS OR LIFTING AND MOVING HEAVY FURNITURE: YES
CAN YOU PARTICIPATE IN STRENOUS SPORTS LIKE SWIMMING, SINGLES TENNIS, FOOTBALL, BASKETBALL, OR SKIING: NO
CAN YOU DO MODERATE WORK AROUND THE HOUSE LIKE VACUUMING, SWEEPING FLOORS OR CARRYING GROCERIES: YES

## 2025-06-02 ASSESSMENT — ENCOUNTER SYMPTOMS
ENDOCRINE NEGATIVE: 1
CARDIOVASCULAR NEGATIVE: 1
NEUROLOGICAL NEGATIVE: 1
GASTROINTESTINAL NEGATIVE: 1
RESPIRATORY NEGATIVE: 1
NECK NEGATIVE: 1
CONSTITUTIONAL NEGATIVE: 1

## 2025-06-02 ASSESSMENT — LIFESTYLE VARIABLES: SMOKING_STATUS: NONSMOKER

## 2025-06-02 NOTE — PREPROCEDURE INSTRUCTIONS
Fasting Guidelines    NPO Instructions:    Do not eat any food after midnight the night before your surgery/procedure.  You may have up to Thirteen ounces of clear liquids until TWO hours before your instructed arrival time to the hospital. This includes water, black tea/coffee, (no milk or cream), apple juice, and/or electrolyte drinks (Gatorade).  You may chew gum up to TWO hours before your surgery/procedure.    Additional Instructions:    Avoid herbal supplements, multivitamins and NSAIDS (non-steroidal anti-inflammatory drugs) such as Advil, Aleve, Ibuprofen, Naproxen, Excedrin, Meloxicam or Celebrex for at least 7 days prior to surgery. May take Tylenol as needed.    Avoid tobacco and alcohol products for 24 hours prior to surgery.    CONTACT SURGEON'S OFFICE IF YOU DEVELOP:  * Fever = 100.4 F   * New respiratory symptoms (e.g. cough, shortness of breath, respiratory distress, sore throat)  * Recent loss of taste or smell  *Flu like symptoms such as headache, fatigue or gastrointestinal symptoms  * You develop any open sores, shingles, burning or painful urination   AND/OR:  * You no longer wish to have the surgery.  * Any other personal circumstances change that may lead to the need to cancel or defer this surgery.  *You were admitted to any hospital within one week of your planned procedure.    Seven/Six Days before Surgery:  Review your medication instructions, stop indicated medications    Day of Surgery:  Review your medication instructions, take indicated medications  Wear comfortable loose fitting clothing  Do not use moisturizers, creams, lotions or perfume  All jewelry and valuables should be left at home        [facility]  [phone]       Preoperative Brain Exercises    What are brain exercises?  A brain exercise is any activity that engages your thinking (cognitive) skills.    What types of activities are considered brain exercises?  Jigilmaaw puzzles, crossword  puzzles, word jumble, memory games, word search, and many more.  Many can be found free online or on your phone via a mobile vincenzo.    Why should I do brain exercises before my surgery?  More recent research has shown brain exercise before surgery can lower the risk of postoperative delirium (confusion) which can be especially important for older adults.  Patients who did brain exercises for 5 to 10 hours the days before surgery, cut their risk of postoperative delirium in half up to 1 week after surgery.         [facility]    Preoperative Deep Breathing Exercises    Why it is important to do deep breathing exercises before my surgery?  Deep breathing exercises strengthen your breathing muscles.  This helps you to recover after your surgery and decreases the chance of breathing complications.      How are the deep breathing exercises done?  Sit straight with your back supported.  Breathe in deeply and slowly through your nose. Your lower rib cage should expand and your abdomen may move forward.  Hold that breath for 3 to 5 seconds.  Breathe out through pursed lips, slowly and completely.  Rest and repeat 10 times every hour while awake.  Rest longer if you become dizzy or lightheaded.         Patient and Family Education             Ways You Can Help Prevent Blood Clots             This handout explains some simple things you can do to help prevent blood clots.      Blood clots are blockages that can form in the body's veins. When a blood clot forms in your deep veins, it may be called a deep vein thrombosis, or DVT for short. Blood clots can happen in any part of the body where blood flows, but they are most common in the arms and legs. If a piece of a blood clot breaks free and travels to the lungs, it is called a pulmonary embolus (PE). A PE can be a very serious problem.         Being in the hospital or having surgery can raise your chances of getting a blood clot because you may not be  well enough to move around as much as you normally do.         Ways you can help prevent blood clots in the hospital         Wearing SCDs. SCDs stands for Sequential Compression Devices.   SCDs are special sleeves that wrap around your legs  They attach to a pump that fills them with air to gently squeeze your legs every few minutes.   This helps return the blood in your legs to your heart.   SCDs should only be taken off when walking or bathing.   SCDs may not be comfortable, but they can help save your life.               Wearing compression stockings - if your doctor orders them. These special snug fitting stockings gently squeeze your legs to help blood flow.       Walking. Walking helps move the blood in your legs.   If your doctor says it is ok, try walking the halls at least   5 times a day. Ask us to help you get up, so you don't fall.      Taking any blood thinning medicines your doctor orders.        Page 1 of 2     [facility]; 3/23   Ways you can help prevent blood clots at home       Wearing compression stockings - if your doctor orders them. ? Walking - to help move the blood in your legs.       Taking any blood thinning medicines your doctor orders.      Signs of a blood clot or PE      Tell your doctor or nurse know right away if you have of the problems listed below.    If you are at home, seek medical care right away. Call 911 for chest pain or problems breathing.               Signs of a blood clot (DVT) - such as pain,  swelling, redness or warmth in your arm or leg      Signs of a pulmonary embolism (PE) - such as chest     pain or feeling short of breath

## 2025-06-02 NOTE — CPM/PAT H&P
"CPM/PAT Evaluation       Name: Blanquita Morales (Blanquita Morales)  /Age: 1950/74 y.o.     Visit Type:   In-Person       Chief Complaint: Giant Cell Tumor-Bone    HPI  Blanquita Morales is a 74 y.o. female referred to [facility] by Dr. Wilmer Gardner,  for perioperative evaluation in advance of left distal femur resection scheduled on 25.  Patient has a past medical history significant for: hyperlipidemia, degenerative joint disease, gastroesophageal reflux disease, hearing loss, carpal tunnel syndrome, psoriasis, history of latent TB, , treated with INH, S/p L patholoogical distal femur Fx, S/p 2024 ORIF distal femur.       Medical History[1]    Surgical History[2]    Patient Sexual activity questions deferred to the physician.    Family History[3]    Allergies[4]    Prior to Admission medications    Medication Sig Start Date End Date Taking? Authorizing Provider   acetaminophen (Tylenol) 500 mg tablet Take by mouth every 6 hours if needed for mild pain (1 - 3).    Historical Provider, MD   alcohol swabs (Alcohol Pads) pads, medicated Use with injections topically 25   Teresa Swenson MD   calcium carbonate-vitamin D3 (Calcium 600 + D,3,) 600 mg-10 mcg (400 unit) tablet Take 1 tablet by mouth once daily.    Historical Provider, MD   cholecalciferol (Vitamin D3) 5,000 Units tablet Take 1 tablet (125 mcg) by mouth once daily.    Historical Provider, MD   cyanocobalamin (Vitamin B-12) 1,000 mcg/mL injection Take 1000 Mcg IM daily for 7 days, then take 1000 mcg weekly # 4 weeks, then take 1000 Mcg every month 25   Teresa Swenson MD   famotidine (Pepcid) 40 mg tablet Take 1 tablet (40 mg) by mouth once daily at bedtime. 3/10/25 7/8/25  Shirley Culp MD   fluocinolone (Derma-Smoothe/FS Body Oil) 0.01 % external oil Apply once a day to scalp leave on overnight or at least 4 hours before washing out 24   Taj Watts MD   insulin syringe-needle U-100 31G X \" 1 mL syringe Use " to inject  vit b12 as directed 1/23/25 1/23/26  Teresa Swenson MD   ketoconazole (NIZOral) 2 % shampoo Apply shampoo, lather, leave on 3-5 minutes rinse off 1/31/24   Taj Watts MD   meclizine (Antivert) 25 mg tablet Take twice a day , as needed for dizziness 10/22/24   Teresa Swenson MD   omeprazole (PriLOSEC) 20 mg tablet,delayed release (DR/EC) EC tablet Take 1 tablet (20 mg) by mouth once daily.  Patient not taking: Reported on 4/29/2025 1/23/25 4/24/25  Teresa Swenson MD   pantoprazole (ProtoNix) 40 mg EC tablet Take 1 tablet (40 mg) by mouth once daily in the evening.  Take before meals. Do not crush, chew, or split. 3/10/25 6/8/25  Shirley Culp MD        PAT ROS:   Constitutional:   neg    Neuro/Psych:   neg    Eyes:    use of corrective lenses  Ears:   neg    Nose:   neg    Mouth:   neg    Throat:   neg    Neck:   neg    Cardio:   neg    Respiratory:   neg    Endocrine:   neg    GI:   neg    :   neg    Musculoskeletal:    7-8/10 leg pain  Hematologic:   neg    Skin:  neg        Physical Exam  Vitals reviewed.   HENT:      Head: Normocephalic and atraumatic.      Nose: Nose normal.      Mouth/Throat:      Mouth: Mucous membranes are moist.   Cardiovascular:      Rate and Rhythm: Normal rate and regular rhythm.      Pulses: Normal pulses.   Pulmonary:      Effort: Pulmonary effort is normal.   Abdominal:      Palpations: Abdomen is soft.   Musculoskeletal:         General: Normal range of motion.      Cervical back: Normal range of motion.   Skin:     General: Skin is warm.   Neurological:      General: No focal deficit present.      Mental Status: She is alert and oriented to person, place, and time.   Psychiatric:         Mood and Affect: Mood normal.         Behavior: Behavior normal.          PAT AIRWAY:   Airway:     Mallampati::  II    TM distance::  >3 FB    Neck ROM::  Full  normal        Testing/Diagnostic:     Patient Specialist/PCP:     Visit Vitals  OB Status  Postmenopausal   Smoking Status Never       DASI Risk Score    No data to display       Caprini DVT Assessment      Flowsheet Row ED to Hosp-Admission (Discharged) from 11/15/2023 in [facility] 6 with Antonio Waite MD, Antonio Waite MD, Smith Menjivar MD and Sean Almendarez MD   DVT Score (IF A SCORE IS NOT CALCULATING, MUST SELECT A BMI TO COMPLETE) 10 filed at 11/16/2023 2048   BMI (BMI MUST BE CHOSEN) 31-40 (Obesity) filed at 11/16/2023 2048   RETIRED: Current Status Major surgery planned, lasting over 3 hours filed at 11/16/2023 2048   RETIRED: Age 60-75 years filed at 11/16/2023 2048          Modified Frailty Index    No data to display       [id number] Stroke Risk Points  Current as of just now        N/A 0 to 9 Points:      Last Change: N/A          The [id number]-ONBv risk score (Lip GH, et al. 2009. © 2010 American College [employer]) quantifies the risk of stroke for a patient with atrial fibrillation. For patients without atrial fibrillation or under the age of 18 this score appears as N/A. Higher score values generally indicate higher risk of stroke.        This score is not applicable to this patient. Components are not calculated.          Revised Cardiac Risk Index    No data to display       Apfel Simplified Score    No data to display       Risk Analysis Index Results This Encounter    No data found in the last 10 encounters.       Prodigy: High Risk  Total Score: 12              Prodigy Age Score           ARISCAT Score for Postoperative Pulmonary Complications    No data to display       [place] Perioperative Risk for Myocardial Infarction or Cardiac Arrest (ANGELA)    No data to display         Assessment and Plan:     Anesthesia  The patient denies problems with anesthesia in the past such as PONV, prolonged sedation, awareness, dental damage, aspiration, cardiac arrest, difficult intubation, or unexpected hospital admissions.      Neurology  The patient has no neurological diagnoses or significant findings on chart review, clinical presentation, and evaluation. No grossly apparent neurological perioperative risk. The patient is at increased risk for postoperative delirium secondary to age 65 or older. The patient is at increased risk for perioperative stroke secondary to a-fib, increased age, female gender, general anesthesia, operative time >2.5 hours. Handouts for preoperative brain exercises given to patient.    HEENT/Airway  No documented or reported history of airway difficulty.     Cardiovascular  The patient is scheduled for non-cardiac surgery associated with elevated risk. The patient has no major cardiac contraindications to non- cardiac surgery.    EKG  The patient has no EKG or echocardiographic changes concerning for myocardial ischemia.     Heart Failure  The patient has no known history of heart failure.  Additionally, the patient reports no symptoms of heart failure and demonstrates no signs of heart failure.    Hypertension Evaluation  The patient has no known history of hypertension and has a normal blood pressure today.    Heart Rhythm Evaluation  Patient has a history of Afib which was an isolated incident. Pt is not on anticoagulation    Heart Valve Evaluation  The patient has no known history of valvular heart disease. The patient has no symptoms or physical exam findings to suggest valvular heart disease.    Cardiology Evaluation  The patient is not followed by cardiology.    Pulmonary  No significant findings on chart review or clinical presentation and evaluation. The patient is at increased risk of perioperative pulmonary complications secondary to advanced age greater than 60.    RCRI  The patient meets 0 RCRI criteria and therefor has a 3.9% risk of major adverse cardiac complications.    METS  The patient's functional capacity is greater than 4 METS.    ANGELA score which indicates a 0.2% risk of intraoperative  or 30-day postoperative MACE (major adverse cardiac event).    The patient has a stop bang score of 1, which places patient at low risk for having SABRA.    ARISCAT 26, Intermediate, 13.3% risk of in-hospital postoperative pulmonary complications    PRODIGY 12, intermediate risk of respiratory depression episode. Patient given PI sheet for preoperative deep breathing exercises.    Caprini score 10, high risk of perioperative VTE.     Eat 10- 0,  self-perceived oropharyngeal dysphagia scale (0-40)     Hematology  The patient has giant cell tumor of bone    Antiplatelet management   The patient is not currently receiving antiplatelet therapy.    Anticoagulation management  The patient is not currently receiving anticoagulation therapy.    Patient instructed to ambulate as soon as possible postoperatively to decrease thromboembolic risk. Initiate mechanical DVT prophylaxis as soon as possible and initiate chemical prophylaxis when deemed safe from a bleeding standpoint post surgery.     Transfusion Evaluation  T&S not obtained. Low likelihood for perioperative transfusion of blood or blood products.    Gastrointestinal  The patient has GERD    Genitourinary  The patient has remote h/o nephrolithiasis    Renal  No renal diagnoses or significant findings on chart review or clinical presentation and evaluation. The patient has specific risk factors associated with increased risk of perioperative renal complications related to age greater than 55.    Musculoskeletal  No diagnoses or significant findings on chart review or clinical presentation and evaluation.    Endocrine  Diabetes Evaluation  The patient has no history of diabetes mellitus.    Thyroid Disease Evaluation  The patient has no history of thyroid disease.    ID  No diagnoses or significant findings on chart review or clinical presentation and evaluation.    -Preoperative medication instructions were provided and reviewed with the patient.  Any additional testing  or evaluation was explained to the patient.  NPO Instructions were discussed, and the patient's questions were answered prior to conclusion of this encounter. Patient verbalized understanding of preoperative instructions. After Visit Summary given.        Recent Results (from the past week)   CBC    Collection Time: 06/02/25  3:08 PM   Result Value Ref Range    WBC 5.2 4.4 - 11.3 x10*3/uL    nRBC 0.0 0.0 - 0.0 /100 WBCs    RBC 4.53 4.00 - 5.20 x10*6/uL    Hemoglobin 12.2 12.0 - 16.0 g/dL    Hematocrit 40.5 36.0 - 46.0 %    MCV 89 80 - 100 fL    MCH 26.9 26.0 - 34.0 pg    MCHC 30.1 (L) 32.0 - 36.0 g/dL    RDW 15.2 (H) 11.5 - 14.5 %    Platelets 234 150 - 450 x10*3/uL   Basic Metabolic Panel    Collection Time: 06/02/25  3:08 PM   Result Value Ref Range    Glucose 86 74 - 99 mg/dL    Sodium 138 136 - 145 mmol/L    Potassium 4.2 3.5 - 5.3 mmol/L    Chloride 102 98 - 107 mmol/L    Bicarbonate 27 21 - 32 mmol/L    Anion Gap 13 10 - 20 mmol/L    Urea Nitrogen 16 6 - 23 mg/dL    Creatinine 0.50 0.50 - 1.05 mg/dL    eGFR >90 >60 mL/min/1.73m*2    Calcium 10.0 8.6 - 10.6 mg/dL   Coagulation Screen    Collection Time: 06/02/25  3:08 PM   Result Value Ref Range    Protime 10.9 9.8 - 12.4 seconds    INR 1.0 0.9 - 1.1    aPTT 30 26 - 36 seconds   Hepatic Function Panel    Collection Time: 06/02/25  3:08 PM   Result Value Ref Range    Albumin 4.4 3.4 - 5.0 g/dL    Bilirubin, Total 0.3 0.0 - 1.2 mg/dL    Bilirubin, Direct 0.0 0.0 - 0.3 mg/dL    Alkaline Phosphatase 134 33 - 136 U/L    ALT 37 7 - 45 U/L    AST 29 9 - 39 U/L    Total Protein 7.2 6.4 - 8.2 g/dL           [1]   Past Medical History:  Diagnosis Date    Abnormal levels of other serum enzymes 08/31/2016    Abnormal liver enzymes    Acute maxillary sinusitis, unspecified 11/30/2016    Acute non-recurrent maxillary sinusitis    Acute pharyngitis, unspecified 05/06/2015    Sore throat    Cardiomegaly 08/31/2016    Enlarged LA (left atrium)    Encounter for gynecological  examination (general) (routine) without abnormal findings 08/31/2016    Encounter for routine gynecological examination    Infectious gastroenteritis and colitis, unspecified 06/23/2017    Diarrhea of infectious origin    Medial epicondylitis, left elbow 01/23/2017    Epicondylitis elbow, medial, left    Nausea 09/02/2014    Nausea    Otalgia, bilateral 05/06/2015    Otalgia of both ears    Other abnormal and inconclusive findings on diagnostic imaging of breast 08/31/2016    Abnormal mammogram    Other conditions influencing health status     Normal endoscopy    Palpitations 08/31/2016    Heart palpitations    Paroxysmal atrial fibrillation (Multi) 05/29/2020    Paroxysmal atrial fibrillation    Personal history of other diseases of the circulatory system 08/31/2016    History of angina pectoris    Personal history of other diseases of the circulatory system 08/31/2016    History of atrial fibrillation    Personal history of other diseases of the digestive system     History of gastrointestinal hemorrhage    Personal history of other diseases of the digestive system 03/30/2015    History of rectal bleeding    Personal history of other diseases of the nervous system and sense organs 11/30/2016    History of serous otitis media    Personal history of other diseases of the respiratory system 11/30/2016    History of acute bronchitis    Personal history of other diseases of the respiratory system 07/01/2015    History of sore throat    Personal history of other diseases of the respiratory system 05/06/2015    History of streptococcal pharyngitis    Personal history of other diseases of the respiratory system 08/31/2016    History of acute pharyngitis    Personal history of other infectious and parasitic diseases 08/31/2016    History of candidiasis of mouth    Personal history of other medical treatment 01/19/2016    History of screening mammography    Personal history of other specified conditions     History of  dizziness    Right upper quadrant pain 06/19/2017    Abdominal pain, right upper quadrant    Superficial mycosis, unspecified 06/23/2017    Fungal dermatitis   [2]   Past Surgical History:  Procedure Laterality Date    KNEE SURGERY Left 2023    giant cell tumor   [3]   Family History  Problem Relation Name Age of Onset    Diabetes Mother Mindy Najaney     Stroke Mother Mindy Jere     Heart attack Father     [4]   Allergies  Allergen Reactions    Amoxicillin Nausea/vomiting    Penicillins Nausea/vomiting

## 2025-06-04 ENCOUNTER — APPOINTMENT (OUTPATIENT)
Dept: PRIMARY CARE | Facility: CLINIC | Age: 75
End: 2025-06-04
Payer: COMMERCIAL

## 2025-06-04 DIAGNOSIS — E04.9 THYROID ENLARGEMENT: Primary | ICD-10-CM

## 2025-06-04 DIAGNOSIS — E78.5 HYPERLIPIDEMIA, UNSPECIFIED HYPERLIPIDEMIA TYPE: ICD-10-CM

## 2025-06-04 PROCEDURE — 99213 OFFICE O/P EST LOW 20 MIN: CPT | Performed by: INTERNAL MEDICINE

## 2025-06-04 RX ORDER — ATORVASTATIN CALCIUM 20 MG/1
20 TABLET, FILM COATED ORAL DAILY
Qty: 100 TABLET | Refills: 3 | Status: SHIPPED | OUTPATIENT
Start: 2025-06-04 | End: 2026-07-09

## 2025-06-04 NOTE — PROGRESS NOTES
Subjective   Patient ID: Blanquita Morales is a 74 y.o. female who is evaluated via telemedicine/virtual visit.  Chief complaint is left leg pain, difficulty turning the leg, or change  her position.   Patient is scheduled For surgery 6/9/2025.  Concerned about elevated cholesterol.  Virtual or Telephone Consent    An interactive audio and video telecommunication system which permits real time communications between the patient (at the originating site) and provider (at the distant site) was utilized to provide this telehealth service.   Verbal consent was requested and obtained from Blanquita Morales on this date, 06/04/25 for a telehealth visit and the patient's location was confirmed at the time of the visit.     HPI     This is a 74 years old Mexican-speaking lady with medical history of hyperlipidemia, degenerative joint disease, gastroesophageal reflux disease, hearing loss, carpal tunnel syndrome, psoriasis, history of latent TB,  2022, treated with INH, S/p L patholoogical distal femur Fx, S/p 11/16/2024  ORIF distal femur.  Patient is evaluated  via telemedicine.  Has severe leg pain, having difficulty to the leg, difficulty to change her position.  Concerned about cholesterol.  Was not interested to start on medications.  Stated, that scheduled to have surgery for resection left distal femur DR Gardner.    Patient is upset, crying.     Review of Systems   Musculoskeletal:  Positive for arthralgias and gait problem.       Objective   There were no vitals taken for this visit.    Physical Exam    Assessment/Plan   Problem List Items Addressed This Visit           ICD-10-CM    Hyperlipidemia E78.5    Relevant Medications    atorvastatin (Lipitor) 20 mg tablet     Other Visit Diagnoses         Codes      Thyroid enlargement    -  Primary E04.9    Relevant Orders    US thyroid                L leg pain.   S/p L patholoogical distal femur Fx.  S/p 11/16/2024  ORIF distal femur.  Followed by DR Jj Guillen.  Had open  Bx L distal femur lesion, curettage as well as plate fixation, in addition to cement 11/2023.  Has Giant cell tumor L femur.   MRI 10/21/2024 was negative.     Patient is scheduled to have surgery 6/9/2025.  Due to resection left distal femur DR Gardner.    -Hyperlipidemia.  Keep Mediterranean diet, exercise, weight loss.  Last lipid panel 4/30/2025 total cholesterol 283, LDL is 170.  Advised to take atorvastatin.  Last CT cardiac score 2017, was 2.       Gastroesophageal reflux disease.  Eat small portions.  Avoid Caffeine, spicy foods, chocolate, alcohol.  Do not wear tight clothes.  Keep last meal before bed time > 3 hours.  Keep head side of the bed elevated at all time.   EGD 4/30/2025.     -Degenerative joint disease.  Arthralgia.  Take Tylenol as needed.  Exercise.     -History of latent TB.  Treated with INH.     -Health maintenance.  Medicare wellness annual exam is up to date.  DEXA scan, cologuard  8/4/2023.  Mammography 11/2024.     - Class 1 Obesity with 31. 32   Diet, exercise.  Keep ideal BMI less than 25.   9/2025.

## 2025-06-05 ASSESSMENT — ENCOUNTER SYMPTOMS: ARTHRALGIAS: 1

## 2025-06-06 NOTE — PROGRESS NOTES
Pharmacy Medication History Review    Blanquita Morales is a 74 y.o. female who is planned to be admitted for Bone, giant cell tumor. Pharmacy called the patient prior to their scheduled procedure and reviewed the patient's [id number] medications for accuracy.    Medications ADDED:  none  Medications CHANGED:  none  Medications REMOVED:   none    Please review updated prior to admission medication list and comments regarding how patient may be taking medications differently by going to Admission tab --> Admission Orders --> Admit Orders / Review prior to admission medications.     Preferred pharmacy, last doses of medications, and allergies to be confirmed with patient by nursing the day of procedure.     Sources used to complete the med history include:  [employer]  Pharmacy dispense history  Child *daughter - kourtney* Good historian  Chart Review  Care Everywhere     Below are additional concerns with the patient's PTA list.  States patient has NOT started atorvastatin 20mg yet. Starting after procedure. Ordered 06/04/25  States patient is NOT taking any medications at this time. Stopped vitamins in preparation of procedure  States patient takes stomach medications - famotidine and pantoprazole 40mg as needed, does not take them daily    Kerri Morales Ryan  Please reach out via Secure Chat for questions

## 2025-06-08 ENCOUNTER — ANESTHESIA EVENT (OUTPATIENT)
Dept: OPERATING ROOM | Facility: HOSPITAL | Age: 75
DRG: 470 | End: 2025-06-08
Payer: COMMERCIAL

## 2025-06-09 ENCOUNTER — APPOINTMENT (OUTPATIENT)
Dept: RADIOLOGY | Facility: HOSPITAL | Age: 75
DRG: 470 | End: 2025-06-09
Payer: COMMERCIAL

## 2025-06-09 ENCOUNTER — ANESTHESIA (OUTPATIENT)
Dept: OPERATING ROOM | Facility: HOSPITAL | Age: 75
DRG: 470 | End: 2025-06-09
Payer: COMMERCIAL

## 2025-06-09 ENCOUNTER — HOSPITAL ENCOUNTER (INPATIENT)
Facility: HOSPITAL | Age: 75
LOS: 1 days | Discharge: HOME HEALTH | DRG: 470 | End: 2025-06-10
Attending: STUDENT IN AN ORGANIZED HEALTH CARE EDUCATION/TRAINING PROGRAM | Admitting: STUDENT IN AN ORGANIZED HEALTH CARE EDUCATION/TRAINING PROGRAM
Payer: COMMERCIAL

## 2025-06-09 DIAGNOSIS — D48.0 BONE, GIANT CELL TUMOR: ICD-10-CM

## 2025-06-09 DIAGNOSIS — G89.18 ACUTE POST-OPERATIVE PAIN: Primary | ICD-10-CM

## 2025-06-09 LAB
ABO GROUP (TYPE) IN BLOOD: NORMAL
ANTIBODY SCREEN: NORMAL
ERYTHROCYTE [DISTWIDTH] IN BLOOD BY AUTOMATED COUNT: 14.3 % (ref 11.5–14.5)
HCT VFR BLD AUTO: 39.1 % (ref 36–46)
HGB BLD-MCNC: 12.2 G/DL (ref 12–16)
MCH RBC QN AUTO: 27.1 PG (ref 26–34)
MCHC RBC AUTO-ENTMCNC: 31.2 G/DL (ref 32–36)
MCV RBC AUTO: 87 FL (ref 80–100)
NRBC BLD-RTO: 0 /100 WBCS (ref 0–0)
PLATELET # BLD AUTO: 222 X10*3/UL (ref 150–450)
RBC # BLD AUTO: 4.5 X10*6/UL (ref 4–5.2)
RH FACTOR (ANTIGEN D): NORMAL
WBC # BLD AUTO: 13.8 X10*3/UL (ref 4.4–11.3)

## 2025-06-09 PROCEDURE — 7100000001 HC RECOVERY ROOM TIME - INITIAL BASE CHARGE: Performed by: STUDENT IN AN ORGANIZED HEALTH CARE EDUCATION/TRAINING PROGRAM

## 2025-06-09 PROCEDURE — 2780000003 HC OR 278 NO HCPCS: Performed by: STUDENT IN AN ORGANIZED HEALTH CARE EDUCATION/TRAINING PROGRAM

## 2025-06-09 PROCEDURE — A27447 PR TOTAL KNEE ARTHROPLASTY: Performed by: ANESTHESIOLOGY

## 2025-06-09 PROCEDURE — P9045 ALBUMIN (HUMAN), 5%, 250 ML: HCPCS | Mod: JZ,TB

## 2025-06-09 PROCEDURE — 36415 COLL VENOUS BLD VENIPUNCTURE: CPT

## 2025-06-09 PROCEDURE — 2500000005 HC RX 250 GENERAL PHARMACY W/O HCPCS

## 2025-06-09 PROCEDURE — 99223 1ST HOSP IP/OBS HIGH 75: CPT

## 2025-06-09 PROCEDURE — 3600000017 HC OR TIME - EACH INCREMENTAL 1 MINUTE - PROCEDURE LEVEL SIX: Performed by: STUDENT IN AN ORGANIZED HEALTH CARE EDUCATION/TRAINING PROGRAM

## 2025-06-09 PROCEDURE — 85027 COMPLETE CBC AUTOMATED: CPT

## 2025-06-09 PROCEDURE — 36430 TRANSFUSION BLD/BLD COMPNT: CPT | Mod: GC

## 2025-06-09 PROCEDURE — 88311 DECALCIFY TISSUE: CPT | Performed by: PATHOLOGY

## 2025-06-09 PROCEDURE — 2500000005 HC RX 250 GENERAL PHARMACY W/O HCPCS: Performed by: STUDENT IN AN ORGANIZED HEALTH CARE EDUCATION/TRAINING PROGRAM

## 2025-06-09 PROCEDURE — 3600000018 HC OR TIME - INITIAL BASE CHARGE - PROCEDURE LEVEL SIX: Performed by: STUDENT IN AN ORGANIZED HEALTH CARE EDUCATION/TRAINING PROGRAM

## 2025-06-09 PROCEDURE — 1200000002 HC GENERAL ROOM WITH TELEMETRY DAILY

## 2025-06-09 PROCEDURE — 0SRD0J9 REPLACEMENT OF LEFT KNEE JOINT WITH SYNTHETIC SUBSTITUTE, CEMENTED, OPEN APPROACH: ICD-10-PCS | Performed by: STUDENT IN AN ORGANIZED HEALTH CARE EDUCATION/TRAINING PROGRAM

## 2025-06-09 PROCEDURE — C1776 JOINT DEVICE (IMPLANTABLE): HCPCS | Performed by: STUDENT IN AN ORGANIZED HEALTH CARE EDUCATION/TRAINING PROGRAM

## 2025-06-09 PROCEDURE — 7100000002 HC RECOVERY ROOM TIME - EACH INCREMENTAL 1 MINUTE: Performed by: STUDENT IN AN ORGANIZED HEALTH CARE EDUCATION/TRAINING PROGRAM

## 2025-06-09 PROCEDURE — 3700000001 HC GENERAL ANESTHESIA TIME - INITIAL BASE CHARGE: Performed by: STUDENT IN AN ORGANIZED HEALTH CARE EDUCATION/TRAINING PROGRAM

## 2025-06-09 PROCEDURE — C1713 ANCHOR/SCREW BN/BN,TIS/BN: HCPCS | Performed by: STUDENT IN AN ORGANIZED HEALTH CARE EDUCATION/TRAINING PROGRAM

## 2025-06-09 PROCEDURE — 64447 NJX AA&/STRD FEMORAL NRV IMG: CPT

## 2025-06-09 PROCEDURE — 0QBC0ZZ EXCISION OF LEFT LOWER FEMUR, OPEN APPROACH: ICD-10-PCS | Performed by: STUDENT IN AN ORGANIZED HEALTH CARE EDUCATION/TRAINING PROGRAM

## 2025-06-09 PROCEDURE — 88311 DECALCIFY TISSUE: CPT | Mod: TC,SUR | Performed by: STUDENT IN AN ORGANIZED HEALTH CARE EDUCATION/TRAINING PROGRAM

## 2025-06-09 PROCEDURE — 99100 ANES PT EXTEME AGE<1 YR&>70: CPT | Performed by: ANESTHESIOLOGY

## 2025-06-09 PROCEDURE — 3700000002 HC GENERAL ANESTHESIA TIME - EACH INCREMENTAL 1 MINUTE: Performed by: STUDENT IN AN ORGANIZED HEALTH CARE EDUCATION/TRAINING PROGRAM

## 2025-06-09 PROCEDURE — 88309 TISSUE EXAM BY PATHOLOGIST: CPT | Performed by: PATHOLOGY

## 2025-06-09 PROCEDURE — 86900 BLOOD TYPING SEROLOGIC ABO: CPT | Performed by: ANESTHESIOLOGY

## 2025-06-09 PROCEDURE — 27365 RESECT FEMUR/KNEE TUMOR: CPT | Performed by: STUDENT IN AN ORGANIZED HEALTH CARE EDUCATION/TRAINING PROGRAM

## 2025-06-09 PROCEDURE — 2500000004 HC RX 250 GENERAL PHARMACY W/ HCPCS (ALT 636 FOR OP/ED)

## 2025-06-09 PROCEDURE — 30233N1 TRANSFUSION OF NONAUTOLOGOUS RED BLOOD CELLS INTO PERIPHERAL VEIN, PERCUTANEOUS APPROACH: ICD-10-PCS | Performed by: STUDENT IN AN ORGANIZED HEALTH CARE EDUCATION/TRAINING PROGRAM

## 2025-06-09 PROCEDURE — 0QPC04Z REMOVAL OF INTERNAL FIXATION DEVICE FROM LEFT LOWER FEMUR, OPEN APPROACH: ICD-10-PCS | Performed by: STUDENT IN AN ORGANIZED HEALTH CARE EDUCATION/TRAINING PROGRAM

## 2025-06-09 PROCEDURE — 2720000007 HC OR 272 NO HCPCS: Performed by: STUDENT IN AN ORGANIZED HEALTH CARE EDUCATION/TRAINING PROGRAM

## 2025-06-09 PROCEDURE — A6213 FOAM DRG >16<=48 SQ IN W/BDR: HCPCS | Performed by: STUDENT IN AN ORGANIZED HEALTH CARE EDUCATION/TRAINING PROGRAM

## 2025-06-09 PROCEDURE — 2500000004 HC RX 250 GENERAL PHARMACY W/ HCPCS (ALT 636 FOR OP/ED): Performed by: ANESTHESIOLOGY

## 2025-06-09 PROCEDURE — 2500000004 HC RX 250 GENERAL PHARMACY W/ HCPCS (ALT 636 FOR OP/ED): Performed by: STUDENT IN AN ORGANIZED HEALTH CARE EDUCATION/TRAINING PROGRAM

## 2025-06-09 PROCEDURE — 36415 COLL VENOUS BLD VENIPUNCTURE: CPT | Performed by: ANESTHESIOLOGY

## 2025-06-09 PROCEDURE — 86923 COMPATIBILITY TEST ELECTRIC: CPT

## 2025-06-09 PROCEDURE — 27447 TOTAL KNEE ARTHROPLASTY: CPT | Performed by: STUDENT IN AN ORGANIZED HEALTH CARE EDUCATION/TRAINING PROGRAM

## 2025-06-09 PROCEDURE — P9016 RBC LEUKOCYTES REDUCED: HCPCS

## 2025-06-09 DEVICE — IMPLANTABLE DEVICE: Type: IMPLANTABLE DEVICE | Site: FEMUR | Status: FUNCTIONAL

## 2025-06-09 DEVICE — PIN, HEADLESS 1/8 X 3.5 FLUTE 4/PK STERILE: Type: IMPLANTABLE DEVICE | Site: FEMUR | Status: FUNCTIONAL

## 2025-06-09 DEVICE — BEADED CABLE AND SLEEVE SET
Type: IMPLANTABLE DEVICE | Site: FEMUR | Status: FUNCTIONAL
Brand: DALL-MILES

## 2025-06-09 DEVICE — TOBRA FULL DOSE ANTIBIOTIC BONE CEMENT, 10 PACK CATALOG NUMBER IS 6197-9-010
Type: IMPLANTABLE DEVICE | Site: FEMUR | Status: FUNCTIONAL
Brand: SIMPLEX

## 2025-06-09 RX ORDER — CYCLOBENZAPRINE HCL 10 MG
5 TABLET ORAL 3 TIMES DAILY PRN
Status: DISCONTINUED | OUTPATIENT
Start: 2025-06-09 | End: 2025-06-10 | Stop reason: HOSPADM

## 2025-06-09 RX ORDER — ESMOLOL HYDROCHLORIDE 10 MG/ML
INJECTION INTRAVENOUS AS NEEDED
Status: DISCONTINUED | OUTPATIENT
Start: 2025-06-09 | End: 2025-06-09

## 2025-06-09 RX ORDER — ALBUMIN HUMAN 50 G/1000ML
SOLUTION INTRAVENOUS AS NEEDED
Status: DISCONTINUED | OUTPATIENT
Start: 2025-06-09 | End: 2025-06-09

## 2025-06-09 RX ORDER — DEXMEDETOMIDINE HYDROCHLORIDE 4 UG/ML
INJECTION, SOLUTION INTRAVENOUS CONTINUOUS PRN
Status: DISCONTINUED | OUTPATIENT
Start: 2025-06-09 | End: 2025-06-09

## 2025-06-09 RX ORDER — LIDOCAINE HYDROCHLORIDE 20 MG/ML
INJECTION, SOLUTION INFILTRATION; PERINEURAL AS NEEDED
Status: DISCONTINUED | OUTPATIENT
Start: 2025-06-09 | End: 2025-06-09

## 2025-06-09 RX ORDER — ACETAMINOPHEN 325 MG/1
650 TABLET ORAL EVERY 6 HOURS SCHEDULED
Status: DISCONTINUED | OUTPATIENT
Start: 2025-06-09 | End: 2025-06-10 | Stop reason: HOSPADM

## 2025-06-09 RX ORDER — OXYCODONE HYDROCHLORIDE 5 MG/1
10 TABLET ORAL EVERY 4 HOURS PRN
Status: DISCONTINUED | OUTPATIENT
Start: 2025-06-09 | End: 2025-06-10 | Stop reason: HOSPADM

## 2025-06-09 RX ORDER — FENTANYL CITRATE 50 UG/ML
25 INJECTION, SOLUTION INTRAMUSCULAR; INTRAVENOUS EVERY 5 MIN PRN
Status: DISCONTINUED | OUTPATIENT
Start: 2025-06-09 | End: 2025-06-09 | Stop reason: HOSPADM

## 2025-06-09 RX ORDER — SODIUM CHLORIDE, SODIUM LACTATE, POTASSIUM CHLORIDE, CALCIUM CHLORIDE 600; 310; 30; 20 MG/100ML; MG/100ML; MG/100ML; MG/100ML
100 INJECTION, SOLUTION INTRAVENOUS CONTINUOUS
Status: DISCONTINUED | OUTPATIENT
Start: 2025-06-09 | End: 2025-06-09 | Stop reason: HOSPADM

## 2025-06-09 RX ORDER — OXYCODONE HYDROCHLORIDE 5 MG/1
5 TABLET ORAL EVERY 6 HOURS PRN
Status: DISCONTINUED | OUTPATIENT
Start: 2025-06-09 | End: 2025-06-10 | Stop reason: HOSPADM

## 2025-06-09 RX ORDER — ONDANSETRON HYDROCHLORIDE 2 MG/ML
4 INJECTION, SOLUTION INTRAVENOUS ONCE AS NEEDED
Status: DISCONTINUED | OUTPATIENT
Start: 2025-06-09 | End: 2025-06-09 | Stop reason: HOSPADM

## 2025-06-09 RX ORDER — ONDANSETRON HYDROCHLORIDE 2 MG/ML
4 INJECTION, SOLUTION INTRAVENOUS EVERY 8 HOURS PRN
Status: DISCONTINUED | OUTPATIENT
Start: 2025-06-09 | End: 2025-06-10 | Stop reason: HOSPADM

## 2025-06-09 RX ORDER — SODIUM CHLORIDE, SODIUM LACTATE, POTASSIUM CHLORIDE, CALCIUM CHLORIDE 600; 310; 30; 20 MG/100ML; MG/100ML; MG/100ML; MG/100ML
50 INJECTION, SOLUTION INTRAVENOUS CONTINUOUS
Status: DISCONTINUED | OUTPATIENT
Start: 2025-06-09 | End: 2025-06-10 | Stop reason: HOSPADM

## 2025-06-09 RX ORDER — OXYCODONE HYDROCHLORIDE 5 MG/1
10 TABLET ORAL EVERY 4 HOURS PRN
Status: DISCONTINUED | OUTPATIENT
Start: 2025-06-09 | End: 2025-06-09 | Stop reason: HOSPADM

## 2025-06-09 RX ORDER — BISACODYL 5 MG
10 TABLET, DELAYED RELEASE (ENTERIC COATED) ORAL DAILY PRN
Status: DISCONTINUED | OUTPATIENT
Start: 2025-06-09 | End: 2025-06-10 | Stop reason: HOSPADM

## 2025-06-09 RX ORDER — POLYETHYLENE GLYCOL 3350 17 G/17G
17 POWDER, FOR SOLUTION ORAL DAILY
Status: DISCONTINUED | OUTPATIENT
Start: 2025-06-09 | End: 2025-06-10 | Stop reason: HOSPADM

## 2025-06-09 RX ORDER — METOCLOPRAMIDE HYDROCHLORIDE 5 MG/ML
10 INJECTION INTRAMUSCULAR; INTRAVENOUS EVERY 6 HOURS PRN
Status: DISCONTINUED | OUTPATIENT
Start: 2025-06-09 | End: 2025-06-10 | Stop reason: HOSPADM

## 2025-06-09 RX ORDER — ONDANSETRON 4 MG/1
4 TABLET, FILM COATED ORAL EVERY 8 HOURS PRN
Status: DISCONTINUED | OUTPATIENT
Start: 2025-06-09 | End: 2025-06-10 | Stop reason: HOSPADM

## 2025-06-09 RX ORDER — PANTOPRAZOLE SODIUM 40 MG/1
40 TABLET, DELAYED RELEASE ORAL DAILY PRN
Status: DISCONTINUED | OUTPATIENT
Start: 2025-06-09 | End: 2025-06-10 | Stop reason: HOSPADM

## 2025-06-09 RX ORDER — ROCURONIUM BROMIDE 10 MG/ML
INJECTION, SOLUTION INTRAVENOUS AS NEEDED
Status: DISCONTINUED | OUTPATIENT
Start: 2025-06-09 | End: 2025-06-09

## 2025-06-09 RX ORDER — KETOROLAC TROMETHAMINE 15 MG/ML
15 INJECTION, SOLUTION INTRAMUSCULAR; INTRAVENOUS EVERY 6 HOURS
Status: DISCONTINUED | OUTPATIENT
Start: 2025-06-09 | End: 2025-06-10 | Stop reason: HOSPADM

## 2025-06-09 RX ORDER — DOCUSATE SODIUM 100 MG/1
100 CAPSULE, LIQUID FILLED ORAL 2 TIMES DAILY
Status: DISCONTINUED | OUTPATIENT
Start: 2025-06-09 | End: 2025-06-10 | Stop reason: HOSPADM

## 2025-06-09 RX ORDER — BISACODYL 10 MG/1
10 SUPPOSITORY RECTAL DAILY PRN
Status: DISCONTINUED | OUTPATIENT
Start: 2025-06-09 | End: 2025-06-10 | Stop reason: HOSPADM

## 2025-06-09 RX ORDER — ONDANSETRON HYDROCHLORIDE 2 MG/ML
INJECTION, SOLUTION INTRAVENOUS AS NEEDED
Status: DISCONTINUED | OUTPATIENT
Start: 2025-06-09 | End: 2025-06-09

## 2025-06-09 RX ORDER — PSYLLIUM HUSK 0.4 G
1 CAPSULE ORAL DAILY
Status: DISCONTINUED | OUTPATIENT
Start: 2025-06-09 | End: 2025-06-10 | Stop reason: HOSPADM

## 2025-06-09 RX ORDER — TOBRAMYCIN 1.2 G/30ML
INJECTION, POWDER, LYOPHILIZED, FOR SOLUTION INTRAVENOUS AS NEEDED
Status: DISCONTINUED | OUTPATIENT
Start: 2025-06-09 | End: 2025-06-09 | Stop reason: HOSPADM

## 2025-06-09 RX ORDER — PROCHLORPERAZINE EDISYLATE 5 MG/ML
5 INJECTION INTRAMUSCULAR; INTRAVENOUS ONCE AS NEEDED
Status: DISCONTINUED | OUTPATIENT
Start: 2025-06-09 | End: 2025-06-09 | Stop reason: HOSPADM

## 2025-06-09 RX ORDER — FAMOTIDINE 10 MG/ML
INJECTION INTRAVENOUS AS NEEDED
Status: DISCONTINUED | OUTPATIENT
Start: 2025-06-09 | End: 2025-06-09

## 2025-06-09 RX ORDER — CHOLECALCIFEROL (VITAMIN D3) 25 MCG
125 TABLET ORAL DAILY
Status: DISCONTINUED | OUTPATIENT
Start: 2025-06-09 | End: 2025-06-10 | Stop reason: HOSPADM

## 2025-06-09 RX ORDER — PROPOFOL 10 MG/ML
INJECTION, EMULSION INTRAVENOUS AS NEEDED
Status: DISCONTINUED | OUTPATIENT
Start: 2025-06-09 | End: 2025-06-09

## 2025-06-09 RX ORDER — ALBUTEROL SULFATE 0.83 MG/ML
2.5 SOLUTION RESPIRATORY (INHALATION) ONCE AS NEEDED
Status: DISCONTINUED | OUTPATIENT
Start: 2025-06-09 | End: 2025-06-09 | Stop reason: HOSPADM

## 2025-06-09 RX ORDER — CEFAZOLIN 1 G/1
INJECTION, POWDER, FOR SOLUTION INTRAVENOUS AS NEEDED
Status: DISCONTINUED | OUTPATIENT
Start: 2025-06-09 | End: 2025-06-09

## 2025-06-09 RX ORDER — SCOPOLAMINE 1 MG/3D
1 PATCH, EXTENDED RELEASE TRANSDERMAL
Status: DISCONTINUED | OUTPATIENT
Start: 2025-06-09 | End: 2025-06-10 | Stop reason: HOSPADM

## 2025-06-09 RX ORDER — SODIUM CHLORIDE 0.9 G/100ML
INJECTION, SOLUTION IRRIGATION AS NEEDED
Status: DISCONTINUED | OUTPATIENT
Start: 2025-06-09 | End: 2025-06-09 | Stop reason: HOSPADM

## 2025-06-09 RX ORDER — METOCLOPRAMIDE HYDROCHLORIDE 5 MG/ML
INJECTION INTRAMUSCULAR; INTRAVENOUS AS NEEDED
Status: DISCONTINUED | OUTPATIENT
Start: 2025-06-09 | End: 2025-06-09

## 2025-06-09 RX ORDER — LIDOCAINE HYDROCHLORIDE 10 MG/ML
0.1 INJECTION, SOLUTION INFILTRATION; PERINEURAL ONCE
Status: DISCONTINUED | OUTPATIENT
Start: 2025-06-09 | End: 2025-06-09 | Stop reason: HOSPADM

## 2025-06-09 RX ORDER — FENTANYL CITRATE 50 UG/ML
INJECTION, SOLUTION INTRAMUSCULAR; INTRAVENOUS AS NEEDED
Status: DISCONTINUED | OUTPATIENT
Start: 2025-06-09 | End: 2025-06-09

## 2025-06-09 RX ORDER — METHOCARBAMOL 100 MG/ML
INJECTION, SOLUTION INTRAMUSCULAR; INTRAVENOUS AS NEEDED
Status: DISCONTINUED | OUTPATIENT
Start: 2025-06-09 | End: 2025-06-09

## 2025-06-09 RX ORDER — DIPHENHYDRAMINE HYDROCHLORIDE 50 MG/ML
INJECTION, SOLUTION INTRAMUSCULAR; INTRAVENOUS AS NEEDED
Status: DISCONTINUED | OUTPATIENT
Start: 2025-06-09 | End: 2025-06-09

## 2025-06-09 RX ORDER — HYDROMORPHONE HYDROCHLORIDE 1 MG/ML
INJECTION, SOLUTION INTRAMUSCULAR; INTRAVENOUS; SUBCUTANEOUS AS NEEDED
Status: DISCONTINUED | OUTPATIENT
Start: 2025-06-09 | End: 2025-06-09

## 2025-06-09 RX ORDER — OXYCODONE HYDROCHLORIDE 5 MG/1
5 TABLET ORAL EVERY 4 HOURS PRN
Status: DISCONTINUED | OUTPATIENT
Start: 2025-06-09 | End: 2025-06-09 | Stop reason: HOSPADM

## 2025-06-09 RX ORDER — ROPIVACAINE HYDROCHLORIDE 5 MG/ML
INJECTION, SOLUTION EPIDURAL; INFILTRATION; PERINEURAL
Status: COMPLETED | OUTPATIENT
Start: 2025-06-09 | End: 2025-06-09

## 2025-06-09 RX ORDER — ACETAMINOPHEN 10 MG/ML
INJECTION, SOLUTION INTRAVENOUS AS NEEDED
Status: DISCONTINUED | OUTPATIENT
Start: 2025-06-09 | End: 2025-06-09

## 2025-06-09 RX ORDER — VANCOMYCIN HYDROCHLORIDE 1 G/20ML
INJECTION, POWDER, LYOPHILIZED, FOR SOLUTION INTRAVENOUS AS NEEDED
Status: DISCONTINUED | OUTPATIENT
Start: 2025-06-09 | End: 2025-06-09 | Stop reason: HOSPADM

## 2025-06-09 RX ORDER — NALOXONE HYDROCHLORIDE 0.4 MG/ML
0.2 INJECTION, SOLUTION INTRAMUSCULAR; INTRAVENOUS; SUBCUTANEOUS EVERY 5 MIN PRN
Status: DISCONTINUED | OUTPATIENT
Start: 2025-06-09 | End: 2025-06-10 | Stop reason: HOSPADM

## 2025-06-09 RX ORDER — PHENYLEPHRINE HYDROCHLORIDE 10 MG/ML
INJECTION INTRAVENOUS AS NEEDED
Status: DISCONTINUED | OUTPATIENT
Start: 2025-06-09 | End: 2025-06-09

## 2025-06-09 RX ORDER — ASPIRIN 81 MG/1
81 TABLET ORAL 2 TIMES DAILY
Status: DISCONTINUED | OUTPATIENT
Start: 2025-06-10 | End: 2025-06-10 | Stop reason: HOSPADM

## 2025-06-09 RX ORDER — CEFAZOLIN SODIUM 2 G/100ML
2 INJECTION, SOLUTION INTRAVENOUS EVERY 8 HOURS
Status: COMPLETED | OUTPATIENT
Start: 2025-06-09 | End: 2025-06-10

## 2025-06-09 RX ORDER — DEXMEDETOMIDINE IN 0.9 % NACL 20 MCG/5ML
SYRINGE (ML) INTRAVENOUS
Status: COMPLETED | OUTPATIENT
Start: 2025-06-09 | End: 2025-06-09

## 2025-06-09 RX ORDER — METOCLOPRAMIDE 10 MG/1
10 TABLET ORAL EVERY 6 HOURS PRN
Status: DISCONTINUED | OUTPATIENT
Start: 2025-06-09 | End: 2025-06-10 | Stop reason: HOSPADM

## 2025-06-09 RX ORDER — TRANEXAMIC ACID 10 MG/ML
INJECTION, SOLUTION INTRAVENOUS AS NEEDED
Status: DISCONTINUED | OUTPATIENT
Start: 2025-06-09 | End: 2025-06-09

## 2025-06-09 RX ORDER — PHENYLEPHRINE 10 MG/250 ML(40 MCG/ML)IN 0.9 % SOD.CHLORIDE INTRAVENOUS
0-2 CONTINUOUS
Status: DISCONTINUED | OUTPATIENT
Start: 2025-06-09 | End: 2025-06-09

## 2025-06-09 RX ORDER — LABETALOL HYDROCHLORIDE 5 MG/ML
5 INJECTION, SOLUTION INTRAVENOUS ONCE AS NEEDED
Status: DISCONTINUED | OUTPATIENT
Start: 2025-06-09 | End: 2025-06-09 | Stop reason: HOSPADM

## 2025-06-09 RX ORDER — MECLIZINE HYDROCHLORIDE 25 MG/1
25 TABLET ORAL 2 TIMES DAILY PRN
Status: DISCONTINUED | OUTPATIENT
Start: 2025-06-09 | End: 2025-06-10 | Stop reason: HOSPADM

## 2025-06-09 RX ORDER — MIDAZOLAM HYDROCHLORIDE 1 MG/ML
INJECTION INTRAMUSCULAR; INTRAVENOUS AS NEEDED
Status: DISCONTINUED | OUTPATIENT
Start: 2025-06-09 | End: 2025-06-09

## 2025-06-09 RX ADMIN — DEXAMETHASONE SODIUM PHOSPHATE 8 MG: 4 INJECTION INTRA-ARTICULAR; INTRALESIONAL; INTRAMUSCULAR; INTRAVENOUS; SOFT TISSUE at 11:47

## 2025-06-09 RX ADMIN — Medication 50 MG: at 11:34

## 2025-06-09 RX ADMIN — Medication 12 MCG: at 10:00

## 2025-06-09 RX ADMIN — ALBUMIN HUMAN 250 ML: 0.05 INJECTION, SOLUTION INTRAVENOUS at 14:59

## 2025-06-09 RX ADMIN — ONDANSETRON 4 MG: 2 INJECTION, SOLUTION INTRAMUSCULAR; INTRAVENOUS at 15:09

## 2025-06-09 RX ADMIN — HYDROMORPHONE HYDROCHLORIDE 0.2 MG: 1 INJECTION, SOLUTION INTRAMUSCULAR; INTRAVENOUS; SUBCUTANEOUS at 12:34

## 2025-06-09 RX ADMIN — ROCURONIUM BROMIDE 10 MG: 10 INJECTION, SOLUTION INTRAVENOUS at 13:57

## 2025-06-09 RX ADMIN — PHENYLEPHRINE HYDROCHLORIDE 120 MCG: 10 INJECTION INTRAVENOUS at 14:30

## 2025-06-09 RX ADMIN — SUGAMMADEX 200 MG: 100 INJECTION, SOLUTION INTRAVENOUS at 15:16

## 2025-06-09 RX ADMIN — PHENYLEPHRINE HYDROCHLORIDE 80 MCG: 10 INJECTION INTRAVENOUS at 15:07

## 2025-06-09 RX ADMIN — TRANEXAMIC ACID 1000 MG: 10 INJECTION, SOLUTION INTRAVENOUS at 11:47

## 2025-06-09 RX ADMIN — PHENYLEPHRINE HYDROCHLORIDE 120 MCG: 10 INJECTION INTRAVENOUS at 14:51

## 2025-06-09 RX ADMIN — HYDROMORPHONE HYDROCHLORIDE 0.2 MG: 1 INJECTION, SOLUTION INTRAMUSCULAR; INTRAVENOUS; SUBCUTANEOUS at 14:11

## 2025-06-09 RX ADMIN — DIPHENHYDRAMINE HYDROCHLORIDE 12.5 MG: 50 INJECTION, SOLUTION INTRAMUSCULAR; INTRAVENOUS at 12:07

## 2025-06-09 RX ADMIN — FAMOTIDINE 20 MG: 10 INJECTION, SOLUTION INTRAVENOUS at 11:47

## 2025-06-09 RX ADMIN — PHENYLEPHRINE HYDROCHLORIDE 120 MCG: 10 INJECTION INTRAVENOUS at 15:14

## 2025-06-09 RX ADMIN — FENTANYL CITRATE 100 MCG: 50 INJECTION, SOLUTION INTRAMUSCULAR; INTRAVENOUS at 11:34

## 2025-06-09 RX ADMIN — ROCURONIUM BROMIDE 20 MG: 10 INJECTION, SOLUTION INTRAVENOUS at 12:50

## 2025-06-09 RX ADMIN — ROCURONIUM BROMIDE 50 MG: 10 INJECTION, SOLUTION INTRAVENOUS at 11:35

## 2025-06-09 RX ADMIN — ALBUMIN HUMAN 250 ML: 0.05 INJECTION, SOLUTION INTRAVENOUS at 14:19

## 2025-06-09 RX ADMIN — PROPOFOL 150 MG: 10 INJECTION, EMULSION INTRAVENOUS at 11:35

## 2025-06-09 RX ADMIN — ROCURONIUM BROMIDE 20 MG: 10 INJECTION, SOLUTION INTRAVENOUS at 12:31

## 2025-06-09 RX ADMIN — PROPOFOL 50 MG: 10 INJECTION, EMULSION INTRAVENOUS at 11:41

## 2025-06-09 RX ADMIN — CEFAZOLIN SODIUM 2 G: 2 INJECTION, SOLUTION INTRAVENOUS at 20:32

## 2025-06-09 RX ADMIN — HYDROMORPHONE HYDROCHLORIDE 0.2 MG: 1 INJECTION, SOLUTION INTRAMUSCULAR; INTRAVENOUS; SUBCUTANEOUS at 12:58

## 2025-06-09 RX ADMIN — METOCLOPRAMIDE 10 MG: 5 INJECTION, SOLUTION INTRAMUSCULAR; INTRAVENOUS at 14:46

## 2025-06-09 RX ADMIN — ROPIVACAINE HYDROCHLORIDE 25 ML: 5 INJECTION, SOLUTION EPIDURAL; INFILTRATION; PERINEURAL at 10:00

## 2025-06-09 RX ADMIN — CEFAZOLIN 2 G: 1 INJECTION, POWDER, FOR SOLUTION INTRAMUSCULAR; INTRAVENOUS at 11:47

## 2025-06-09 RX ADMIN — ESMOLOL HYDROCHLORIDE 30 MG: 100 INJECTION, SOLUTION INTRAVENOUS at 12:47

## 2025-06-09 RX ADMIN — METHOCARBAMOL 1000 MG: 100 INJECTION INTRAMUSCULAR; INTRAVENOUS at 15:09

## 2025-06-09 RX ADMIN — LIDOCAINE HYDROCHLORIDE 100 MG: 20 INJECTION, SOLUTION INFILTRATION; PERINEURAL at 11:35

## 2025-06-09 RX ADMIN — ACETAMINOPHEN 1000 MG: 10 INJECTION, SOLUTION INTRAVENOUS at 14:45

## 2025-06-09 RX ADMIN — HYDROMORPHONE HYDROCHLORIDE 0.6 MG: 1 INJECTION, SOLUTION INTRAMUSCULAR; INTRAVENOUS; SUBCUTANEOUS at 11:34

## 2025-06-09 RX ADMIN — MIDAZOLAM HYDROCHLORIDE 2 MG: 2 INJECTION, SOLUTION INTRAMUSCULAR; INTRAVENOUS at 11:15

## 2025-06-09 RX ADMIN — SODIUM CHLORIDE, POTASSIUM CHLORIDE, SODIUM LACTATE AND CALCIUM CHLORIDE 50 ML/HR: 600; 310; 30; 20 INJECTION, SOLUTION INTRAVENOUS at 18:30

## 2025-06-09 RX ADMIN — SODIUM CHLORIDE, SODIUM LACTATE, POTASSIUM CHLORIDE, AND CALCIUM CHLORIDE: 600; 310; 30; 20 INJECTION, SOLUTION INTRAVENOUS at 11:34

## 2025-06-09 RX ADMIN — DEXMEDETOMIDINE HYDROCHLORIDE 0.2 MCG/KG/HR: 4 INJECTION, SOLUTION INTRAVENOUS at 12:14

## 2025-06-09 RX ADMIN — ESMOLOL HYDROCHLORIDE 20 MG: 100 INJECTION, SOLUTION INTRAVENOUS at 13:28

## 2025-06-09 SDOH — SOCIAL STABILITY: SOCIAL INSECURITY: HAVE YOU HAD ANY THOUGHTS OF HARMING ANYONE ELSE?: NO

## 2025-06-09 SDOH — ECONOMIC STABILITY: HOUSING INSECURITY: AT ANY TIME IN THE PAST 12 MONTHS, WERE YOU HOMELESS OR LIVING IN A SHELTER (INCLUDING NOW)?: NO

## 2025-06-09 SDOH — ECONOMIC STABILITY: FOOD INSECURITY: WITHIN THE PAST 12 MONTHS, YOU WORRIED THAT YOUR FOOD WOULD RUN OUT BEFORE YOU GOT THE MONEY TO BUY MORE.: NEVER TRUE

## 2025-06-09 SDOH — SOCIAL STABILITY: SOCIAL INSECURITY: WERE YOU ABLE TO COMPLETE ALL THE BEHAVIORAL HEALTH SCREENINGS?: YES

## 2025-06-09 SDOH — SOCIAL STABILITY: SOCIAL INSECURITY: ARE YOU OR HAVE YOU BEEN THREATENED OR ABUSED PHYSICALLY, EMOTIONALLY, OR SEXUALLY BY ANYONE?: NO

## 2025-06-09 SDOH — ECONOMIC STABILITY: HOUSING INSECURITY: IN THE PAST 12 MONTHS, HOW MANY TIMES HAVE YOU MOVED WHERE YOU WERE LIVING?: 0

## 2025-06-09 SDOH — SOCIAL STABILITY: SOCIAL INSECURITY: WITHIN THE LAST YEAR, HAVE YOU BEEN HUMILIATED OR EMOTIONALLY ABUSED IN OTHER WAYS BY YOUR PARTNER OR EX-PARTNER?: NO

## 2025-06-09 SDOH — SOCIAL STABILITY: SOCIAL INSECURITY: WITHIN THE LAST YEAR, HAVE YOU BEEN AFRAID OF YOUR PARTNER OR EX-PARTNER?: NO

## 2025-06-09 SDOH — ECONOMIC STABILITY: HOUSING INSECURITY: DO YOU FEEL UNSAFE GOING BACK TO THE PLACE WHERE YOU LIVE?: NO

## 2025-06-09 SDOH — SOCIAL STABILITY: SOCIAL INSECURITY
WITHIN THE LAST YEAR, HAVE YOU BEEN RAPED OR FORCED TO HAVE ANY KIND OF SEXUAL ACTIVITY BY YOUR PARTNER OR EX-PARTNER?: NO

## 2025-06-09 SDOH — ECONOMIC STABILITY: TRANSPORTATION INSECURITY: IN THE PAST 12 MONTHS, HAS LACK OF TRANSPORTATION KEPT YOU FROM MEDICAL APPOINTMENTS OR FROM GETTING MEDICATIONS?: NO

## 2025-06-09 SDOH — ECONOMIC STABILITY: HOUSING INSECURITY: IN THE LAST 12 MONTHS, WAS THERE A TIME WHEN YOU WERE NOT ABLE TO PAY THE MORTGAGE OR RENT ON TIME?: NO

## 2025-06-09 SDOH — ECONOMIC STABILITY: FOOD INSECURITY: WITHIN THE PAST 12 MONTHS, THE FOOD YOU BOUGHT JUST DIDN'T LAST AND YOU DIDN'T HAVE MONEY TO GET MORE.: NEVER TRUE

## 2025-06-09 SDOH — ECONOMIC STABILITY: INCOME INSECURITY: IN THE PAST 12 MONTHS HAS THE ELECTRIC, GAS, OIL, OR WATER COMPANY THREATENED TO SHUT OFF SERVICES IN YOUR HOME?: NO

## 2025-06-09 SDOH — SOCIAL STABILITY: SOCIAL INSECURITY: DO YOU FEEL ANYONE HAS EXPLOITED OR TAKEN ADVANTAGE OF YOU FINANCIALLY OR OF YOUR PERSONAL PROPERTY?: NO

## 2025-06-09 SDOH — SOCIAL STABILITY: SOCIAL INSECURITY
ASK PARENT OR GUARDIAN: ARE THERE TIMES WHEN YOU, YOUR CHILD(REN), OR ANY MEMBER OF YOUR HOUSEHOLD FEEL UNSAFE, HARMED, OR THREATENED AROUND PERSONS WITH WHOM YOU KNOW OR LIVE?: NO

## 2025-06-09 SDOH — SOCIAL STABILITY: SOCIAL INSECURITY
WITHIN THE LAST YEAR, HAVE YOU BEEN KICKED, HIT, SLAPPED, OR OTHERWISE PHYSICALLY HURT BY YOUR PARTNER OR EX-PARTNER?: NO

## 2025-06-09 SDOH — SOCIAL STABILITY: SOCIAL INSECURITY: ARE THERE ANY APPARENT SIGNS OF INJURIES/BEHAVIORS THAT COULD BE RELATED TO ABUSE/NEGLECT?: NO

## 2025-06-09 SDOH — SOCIAL STABILITY: SOCIAL INSECURITY: DOES ANYONE TRY TO KEEP YOU FROM HAVING/CONTACTING OTHER FRIENDS OR DOING THINGS OUTSIDE YOUR HOME?: NO

## 2025-06-09 SDOH — SOCIAL STABILITY: SOCIAL INSECURITY: ABUSE: ADULT

## 2025-06-09 SDOH — SOCIAL STABILITY: SOCIAL INSECURITY: DO YOU FEEL UNSAFE GOING BACK TO THE PLACE WHERE YOU ARE LIVING?: NO

## 2025-06-09 SDOH — ECONOMIC STABILITY: FOOD INSECURITY: HOW HARD IS IT FOR YOU TO PAY FOR THE VERY BASICS LIKE FOOD, HOUSING, MEDICAL CARE, AND HEATING?: NOT HARD AT ALL

## 2025-06-09 SDOH — SOCIAL STABILITY: SOCIAL INSECURITY: HAVE YOU HAD THOUGHTS OF HARMING ANYONE ELSE?: NO

## 2025-06-09 SDOH — SOCIAL STABILITY: SOCIAL INSECURITY: HAS ANYONE EVER THREATENED TO HURT YOUR FAMILY OR YOUR PETS?: NO

## 2025-06-09 SDOH — HEALTH STABILITY: MENTAL HEALTH: CURRENT SMOKER: 0

## 2025-06-09 ASSESSMENT — PAIN SCALES - PAIN ASSESSMENT IN ADVANCED DEMENTIA (PAINAD)
CONSOLABILITY: NO NEED TO CONSOLE
CONSOLABILITY: NO NEED TO CONSOLE
BODYLANGUAGE: RELAXED
CONSOLABILITY: NO NEED TO CONSOLE
BREATHING: NORMAL
FACIALEXPRESSION: SMILING OR INEXPRESSIVE
BREATHING: NORMAL
CONSOLABILITY: NO NEED TO CONSOLE
BREATHING: NORMAL
CONSOLABILITY: NO NEED TO CONSOLE
BODYLANGUAGE: RELAXED
TOTALSCORE: 0
CONSOLABILITY: NO NEED TO CONSOLE
BREATHING: NORMAL
TOTALSCORE: 0
CONSOLABILITY: NO NEED TO CONSOLE
BODYLANGUAGE: RELAXED
FACIALEXPRESSION: SMILING OR INEXPRESSIVE
TOTALSCORE: 0
TOTALSCORE: 0
BODYLANGUAGE: RELAXED
FACIALEXPRESSION: SMILING OR INEXPRESSIVE
BREATHING: NORMAL
BODYLANGUAGE: RELAXED
BREATHING: NORMAL
FACIALEXPRESSION: SMILING OR INEXPRESSIVE
TOTALSCORE: 0
TOTALSCORE: 0
FACIALEXPRESSION: SMILING OR INEXPRESSIVE
BODYLANGUAGE: RELAXED
CONSOLABILITY: NO NEED TO CONSOLE
FACIALEXPRESSION: SMILING OR INEXPRESSIVE
BODYLANGUAGE: RELAXED
FACIALEXPRESSION: SMILING OR INEXPRESSIVE
FACIALEXPRESSION: SMILING OR INEXPRESSIVE
TOTALSCORE: 0
TOTALSCORE: 0
BODYLANGUAGE: RELAXED

## 2025-06-09 ASSESSMENT — COGNITIVE AND FUNCTIONAL STATUS - GENERAL
DRESSING REGULAR LOWER BODY CLOTHING: A LOT
CLIMB 3 TO 5 STEPS WITH RAILING: TOTAL
DRESSING REGULAR UPPER BODY CLOTHING: A LOT
MOBILITY SCORE: 11
WALKING IN HOSPITAL ROOM: A LOT
HELP NEEDED FOR BATHING: A LOT
DAILY ACTIVITIY SCORE: 15
TURNING FROM BACK TO SIDE WHILE IN FLAT BAD: A LOT
STANDING UP FROM CHAIR USING ARMS: A LOT
PATIENT BASELINE BEDBOUND: NO
PERSONAL GROOMING: A LITTLE
TOILETING: A LOT
MOVING TO AND FROM BED TO CHAIR: A LOT
MOVING FROM LYING ON BACK TO SITTING ON SIDE OF FLAT BED WITH BEDRAILS: A LOT

## 2025-06-09 ASSESSMENT — PAIN - FUNCTIONAL ASSESSMENT
PAIN_FUNCTIONAL_ASSESSMENT: 0-10
PAIN_FUNCTIONAL_ASSESSMENT: UNABLE TO SELF-REPORT
PAIN_FUNCTIONAL_ASSESSMENT: 0-10
PAIN_FUNCTIONAL_ASSESSMENT: UNABLE TO SELF-REPORT

## 2025-06-09 ASSESSMENT — ACTIVITIES OF DAILY LIVING (ADL)
TOILETING: NEEDS ASSISTANCE
HEARING - RIGHT EAR: FUNCTIONAL
HEARING - LEFT EAR: FUNCTIONAL
PATIENT'S MEMORY ADEQUATE TO SAFELY COMPLETE DAILY ACTIVITIES?: YES
WALKS IN HOME: NEEDS ASSISTANCE
JUDGMENT_ADEQUATE_SAFELY_COMPLETE_DAILY_ACTIVITIES: YES
LACK_OF_TRANSPORTATION: NO
BATHING: NEEDS ASSISTANCE
ASSISTIVE_DEVICE: CANE;WALKER
ADEQUATE_TO_COMPLETE_ADL: YES
GROOMING: NEEDS ASSISTANCE
LACK_OF_TRANSPORTATION: NO
FEEDING YOURSELF: INDEPENDENT
DRESSING YOURSELF: NEEDS ASSISTANCE

## 2025-06-09 ASSESSMENT — LIFESTYLE VARIABLES
AUDIT-C TOTAL SCORE: 0
SKIP TO QUESTIONS 9-10: 1
HOW OFTEN DO YOU HAVE 6 OR MORE DRINKS ON ONE OCCASION: NEVER
HOW OFTEN DO YOU HAVE A DRINK CONTAINING ALCOHOL: NEVER
AUDIT-C TOTAL SCORE: 0
HOW MANY STANDARD DRINKS CONTAINING ALCOHOL DO YOU HAVE ON A TYPICAL DAY: PATIENT DOES NOT DRINK

## 2025-06-09 ASSESSMENT — PAIN SCALES - GENERAL
PAINLEVEL_OUTOF10: 0 - NO PAIN

## 2025-06-09 ASSESSMENT — PATIENT HEALTH QUESTIONNAIRE - PHQ9
2. FEELING DOWN, DEPRESSED OR HOPELESS: NOT AT ALL
SUM OF ALL RESPONSES TO PHQ9 QUESTIONS 1 & 2: 0
1. LITTLE INTEREST OR PLEASURE IN DOING THINGS: NOT AT ALL

## 2025-06-09 NOTE — ANESTHESIA PROCEDURE NOTES
Airway  Date/Time: 6/9/2025 11:42 AM  Reason: elective    Airway not difficult    Staffing  Performed: resident   Authorized by: Beverley Candelario MD    Performed by: Beverley Candelario MD  Patient location during procedure: OR    Patient Condition  Indications for airway management: anesthesia  Patient position: sniffing  Planned trial extubation  Sedation level: deep     Final Airway Details   Preoxygenated: yes  Final airway type: endotracheal airway  Successful airway: ETT   Successful intubation technique: direct laryngoscopy  Blade: Matias  Blade size: #3  ETT size (mm): 7.0  Cormack-Lehane Classification: grade IIa - partial view of glottis  Measured from: lips  ETT to lips (cm): 23  Number of attempts at approach: 1

## 2025-06-09 NOTE — ANESTHESIA PROCEDURE NOTES
Peripheral IV  Date/Time: 6/9/2025 11:45 AM      Placement  Needle size: 16 G  Laterality: left  Location: hand  Local anesthetic: none  Site prep: chlorhexidine  Technique: anatomical landmarks  Attempts: 1

## 2025-06-09 NOTE — PROGRESS NOTES
ORTHOPAEDIC SURGERY PROGRESS NOTE      Blanquita Morales is a 74 y.o. 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 Gardner.     Subjective   Seen and examined postoperatively. NAEO. AFVSS. NAD, pain well controlled. No numbness/tingling/weakness. No coldness or pallor of extremity. No skin tenting. No fevers, chills, SOB, N, V.    Understands and agrees with plan        Objective     Left lower extremity:  - Dressing c.d.i  - Tender to palpation over L knee   - Compartments soft and compressible  - fires EHL/DF/PF.  - SILT in Marc/Sa/SP/DP/T distribution.  - Palpable DP pulse      Last Recorded Vitals  Blood pressure 119/55, pulse 109, temperature 36.5 °C (97.7 °F), temperature source Temporal, resp. rate 13, SpO2 100%.    Relevant Results  Results for orders placed or performed during the hospital encounter of 06/09/25 (from the past 24 hours)   Type And Screen   Result Value Ref Range    ABO TYPE A     Rh TYPE POS     ANTIBODY SCREEN NEG    Prepare RBC: 2 Units   Result Value Ref Range    PRODUCT CODE G5573Z55     Unit Number [id number]     Unit ABO A     Unit RH POS     XM INTEP COMP     Dispense Status TR     Blood Expiration Date 6/20/2025 11:59:00 PM EDT     PRODUCT BLOOD TYPE 6200     UNIT VOLUME 325     PRODUCT CODE G3238I29     Unit Number [id number]     Unit ABO A     Unit RH POS     XM INTEP COMP     Dispense Status IS     Blood Expiration Date 6/20/2025 11:59:00 PM EDT     PRODUCT BLOOD TYPE 6200     UNIT VOLUME 279        Assessment/Plan   Assessment & Plan  Bone, giant cell tumor        Assessment and Plan  74 y.o. 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 Gardner.     Plan:  - Weight-bearing status:    - WBAT LLE   - Knee immobilzer when resting in bed and at night to prevent flexion contracture   - Feeding: Regular diet as  tolerated, bowel regimen  - Analgesia: Tylenol 650mg, oxy 5mg/10mg, Dilaudid 0.2mg   - Volume: mIV @ at LR 50 cc/hr, for 24 hours HLIVF when tolerating PO, monitor BMP  - OT/PT: Consulted  - Respiratory: Encourage IS, maintain O2 sat >92%  - Infection: Carolyne-operative Ancef for 24 hours, afebrile   - Lines: Maintain PIVx2 while inpatient  - Drains: None   - Damico: Remove after up with PT   - Embolic ppx: SCDs, ASA 81mg BID POD1 for 6 weeks   - Transfusion: Trend CBC daily, no indication for transfusion  - Cardiac: No issues  - Glycemic: No issues  - C/w home medications  - Dispo pending PT, pain control    Plan was discussed with attending Dr. Jj Mccormick MD  Orthopedic Surgery, PGY4  P: [phone] (Epic Chat Preferred)

## 2025-06-09 NOTE — CONSULTS
Blanquita Morales is a 74 y.o. year old female patient who presents for left distal femur resection with Dr. Gardner on 6/9. Acute Pain consulted for block for postoperative pain control.     Anticipated Postop Pain Issues -   Palliative: typically relieved with IV analgesics and regional local anesthetics  Provocative: typically with movement  Quality: typically burning and aching  Radiation: typically none  Severity: typically severe 8-10/10  Timing: typically constant    Medical History[1]     Surgical History[2]     Family History[3]     Social History     Socioeconomic History    Marital status:      Spouse name: Not on file    Number of children: Not on file    Years of education: Not on file    Highest education level: Not on file   Occupational History    Not on file   Tobacco Use    Smoking status: Never     Passive exposure: Never    Smokeless tobacco: Never   Vaping Use    Vaping status: Never Used   Substance and Sexual Activity    Alcohol use: Never    Drug use: Never    Sexual activity: Defer   Other Topics Concern    Not on file   Social History Narrative    Not on file     Social Drivers of Health     Financial Resource Strain: Low Risk  (11/16/2023)    Overall Financial Resource Strain (CARDIA)     Difficulty of Paying Living Expenses: Not hard at all   Food Insecurity: Not on file   Transportation Needs: No Transportation Needs (12/19/2023)    OASIS : Transportation     Lack of Transportation (Medical): No     Lack of Transportation (Non-Medical): No     Patient Unable or Declines to Respond: No   Physical Activity: Not on file   Stress: Not on file   Social Connections: Unknown (12/19/2023)    OASIS : Social Isolation     Frequency of experiencing loneliness or isolation: Patient unable to respond   Intimate Partner Violence: Not on file   Housing Stability: Low Risk  (11/16/2023)    Housing Stability Vital Sign     Unable to Pay for Housing in the Last Year: No     Number of Places  Lived in the Last Year: 1     Unstable Housing in the Last Year: No        RX Allergies[4]      Review of Systems  Gen: No fatigue, anorexia, insomnia, fever.   Eyes: No vision loss, double vision, drainage, eye pain.   ENT: No pharyngitis, dry mouth, no hearing changes or ear discharge  Cardiac: No chest pain, palpitations, syncope, near syncope.   Pulmonary: No shortness of breath, cough, hemoptysis.   Heme/lymph: No swollen glands, fever, bleeding.   GI: No abdominal pain, change in bowel habits, melena, hematemesis, hematochezia, nausea, vomiting, diarrhea.   : No discharge, dysuria, frequency, urgency, hematuria.  Endo: No polyuria or weight loss.   Musculoskeletal: +Left knee pain  Skin: No rashes or lesions  Neuro: Normal speech, no numbness or weakness. No gait difficulties  Review of systems is otherwise negative unless stated above or in history of present illness.    Physical Exam:  Constitutional:  no distress, alert and cooperative  Eyes: clear sclera  Head/Neck: No apparent injury, trachea midline  Respiratory/Thorax: Patent airways, thorax symmetric, breathing comfortably  Cardiovascular: no pitting edema  Gastrointestinal: Nondistended  Musculoskeletal: ROM intact  Extremities: no clubbing  Neurological: alert, lama x4  Psychological: Appropriate affect    No results found for this or any previous visit (from the past 24 hours).     Blanquita Morales is a 74 y.o. year old female patient who presents for left distal femur resection with Dr. Gardner on 6/9. Acute Pain consulted for block for postoperative pain control.     Plan:    - Left femoral nerve single shot block performed preoperatively on 6/9  - Pain medications per primary team  - Will see on POD1 if inpatient    Acute Pain Team  pg [phone] ph [phone].         [1]   Past Medical History:  Diagnosis Date    Abnormal levels of other serum enzymes 08/31/2016    Abnormal liver enzymes    Acute maxillary sinusitis, unspecified 11/30/2016    Acute  non-recurrent maxillary sinusitis    Acute pharyngitis, unspecified 05/06/2015    Sore throat    Anemia     Arrhythmia     one isolated episode    Arthritis     Cardiomegaly 08/31/2016    Enlarged LA (left atrium)    Encounter for gynecological examination (general) (routine) without abnormal findings 08/31/2016    Encounter for routine gynecological examination    GERD (gastroesophageal reflux disease)     HL (hearing loss)     Hyperlipidemia     Infectious gastroenteritis and colitis, unspecified 06/23/2017    Diarrhea of infectious origin    Medial epicondylitis, left elbow 01/23/2017    Epicondylitis elbow, medial, left    Nausea 09/02/2014    Nausea    Nephrolithiasis     Otalgia, bilateral 05/06/2015    Otalgia of both ears    Other abnormal and inconclusive findings on diagnostic imaging of breast 08/31/2016    Abnormal mammogram    Other conditions influencing health status     Normal endoscopy    Palpitations 08/31/2016    Heart palpitations    Paroxysmal atrial fibrillation (Multi) 05/29/2020    Paroxysmal atrial fibrillation    Personal history of other diseases of the circulatory system 08/31/2016    History of angina pectoris    Personal history of other diseases of the circulatory system 08/31/2016    History of atrial fibrillation    Personal history of other diseases of the digestive system     History of gastrointestinal hemorrhage    Personal history of other diseases of the digestive system 03/30/2015    History of rectal bleeding    Personal history of other diseases of the nervous system and sense organs 11/30/2016    History of serous otitis media    Personal history of other diseases of the respiratory system 11/30/2016    History of acute bronchitis    Personal history of other diseases of the respiratory system 07/01/2015    History of sore throat    Personal history of other diseases of the respiratory system 05/06/2015    History of streptococcal pharyngitis    Personal history of other  diseases of the respiratory system 08/31/2016    History of acute pharyngitis    Personal history of other infectious and parasitic diseases 08/31/2016    History of candidiasis of mouth    Personal history of other medical treatment 01/19/2016    History of screening mammography    Personal history of other specified conditions     History of dizziness    Right upper quadrant pain 06/19/2017    Abdominal pain, right upper quadrant    Superficial mycosis, unspecified 06/23/2017    Fungal dermatitis    Vertigo    [2]   Past Surgical History:  Procedure Laterality Date    COLONOSCOPY      KNEE SURGERY Left 2023    giant cell tumor    UPPER GASTROINTESTINAL ENDOSCOPY     [3]   Family History  Problem Relation Name Age of Onset    Diabetes Mother Mindy Sanchezjaney     Stroke Mother Mindy Oquendo     Heart attack Father     [4]   Allergies  Allergen Reactions    Amoxicillin Nausea/vomiting    Penicillins Nausea/vomiting

## 2025-06-09 NOTE — ANESTHESIA PROCEDURE NOTES
Peripheral Block    Patient location during procedure: pre-op  Medication administered at: 6/9/2025 10:00 AM  End time: 6/9/2025 10:05 AM  Reason for block: at surgeon's request and post-op pain management  Staffing  Performed: resident   Authorized by: Lesley Knapp MD    Performed by: Jet Magaña MD  Preanesthetic Checklist  Completed: patient identified, IV checked, site marked, risks and benefits discussed, surgical consent, monitors and equipment checked, pre-op evaluation and timeout performed   Timeout performed at: 6/9/2025 9:58 AM  Peripheral Block  Patient position: laying flat  Prep: ChloraPrep  Patient monitoring: heart rate, continuous pulse ox and cardiac monitor  Block type: femoral  Laterality: left  Injection technique: single-shot  Guidance: ultrasound guided  Local infiltration: lidocaine  Infiltration strength: 1 %  Dose: 3 mL  Needle  Needle type: short-bevel   Needle gauge: 22 G  Needle length: 8 cm  Needle localization: ultrasound guidance     image stored in chart  Assessment  Injection assessment: negative aspiration for heme, incremental injection, local visualized surrounding nerve on ultrasound and no paresthesia on injection  Heart rate change: no  Slow fractionated injection: yes  Additional Notes  Femoral nerve block:    Prior to procedure: Following a focused history, procedure-related and patient-specific complications were discussed. Risks, benefits, and alternatives were explained. Informed, written consent was provided by the patient and/or surrogate decision maker for the block. Anticoagulation (if any) was held per INGRID guidelines. ASA monitors were applied. Patient was positioned, prepped with chlorhexidine, and draped with sterile towels.     Ultrasound guidance was used to visualize the femoral nerve and surrounding structures at the inguinal crease. Skin was numbed with 1% lidocaine. Needle was inserted and advanced towards target with visualization of the needle  throughout duration of the procedure. A total of 12 mcg precedex, 25 cc of 0.5% ropivacaine, was injected unilaterally. Patient tolerated procedure well.    Timeout by [facility]  Medications Administered  dexmedeTOMIDine in 0.9 % NaCL - perineural injection, Left Anterior Thigh   12 mcg - 6/9/2025 10:00:00 AM  ropivacaine (NAROPIN) 5 MG/ML Perineural - perineural injection, Left Anterior Thigh   25 mL - 6/9/2025 10:00:00 AM

## 2025-06-09 NOTE — ANESTHESIA POSTPROCEDURE EVALUATION
Patient: Blanquita Morales    Procedure Summary       Date: 06/09/25 Room / Location: [facility] OR 09 / [facility] [place] [facility]    Anesthesia Start: 1128 Anesthesia Stop: 1536    Procedures:       RESECTION LEFT DISTAL FEMUR (Left: Thigh - Leg Upper)      LEFT DFR (Left: Thigh - Leg Upper) Diagnosis:       Bone, giant cell tumor      (Bone, giant cell tumor [D48.0])    Surgeons: Wilmer Gardner MD Responsible Provider: Beverley Candelario MD    Anesthesia Type: general ASA Status: 2            Anesthesia Type: general    Vitals Value Taken Time   /65 06/09/25 15:36   Temp 36.5 06/09/25 15:36   Pulse 100 06/09/25 15:36   Resp 13 06/09/25 15:36   SpO2 100 06/09/25 15:36       Anesthesia Post Evaluation    Patient location during evaluation: PACU  Patient participation: complete - patient cannot participate  Level of consciousness: sleepy but conscious  Pain management: adequate  Airway patency: patent  Cardiovascular status: acceptable  Respiratory status: acceptable  Hydration status: acceptable  Postoperative Nausea and Vomiting: none        There were no known notable events for this encounter.

## 2025-06-09 NOTE — ANESTHESIA PROCEDURE NOTES
Peripheral IV  Date/Time: 6/9/2025 11:45 AM      Placement  Needle size: 18 G  Laterality: right  Location: wrist  Local anesthetic: none  Site prep: chlorhexidine  Technique: anatomical landmarks  Attempts: 1

## 2025-06-09 NOTE — OP NOTE
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]     Procedures  1.)  Left femur hardware removal (CPT 35971)  2.)  Wide resection left distal femur (CPT 38108)  3.)  Left distal femoral replacement (CPT 11810)    Surgeons      * Wilmer Gardner - Primary    Resident/Fellow/Other Assistant:  Surgeons and Role:     * Miguelito Mccormick MD - Resident - Assisting    Staff:   Circulator: Blas  Scrub Person: Wilmer  Scrveena Person: Ji De Scrub: Yao  Relief Circulator: Muna  Relief Scrub: Ricardo    Anesthesia Staff: Anesthesiologist: Beverley Candelario MD  C-AA: CAIN Chavez  Anesthesia Resident: Diana Melendez MD    Procedure Summary  Anesthesia: General  ASA: II  Estimated Blood Loss: 350 mL  Intra-op Medications:   Administrations occurring from 1020 to 1350 on 25:   Medication Name Total Dose   sodium chloride 0.9 % irrigation solution 3,000 mL   ceFAZolin (Ancef) 1 g 2 g   dexAMETHasone (Decadron) 4 mg/mL 8 mg   dexmedeTOMIDine 4 mcg/mL in 100 mL NS infusion 79.21 mcg   diphenhydrAMINE 50 mg/mL 12.5 mg   esmolol (Brevibloc) injection 50 mg   famotidine (Pepcid) 10 mg/mL 20 mg   fentaNYL (Sublimaze) injection 50 mcg/mL 100 mcg   HYDROmorphone (Dilaudid) injection 1 mg/mL 1 mg   ketamine injection 50 mg/ 5 mL (10 mg/mL) 50 mg   LR bolus Cannot be calculated   lidocaine (Xylocaine) 2 % 100 mg   midazolam PF (Versed) injection 1 mg/mL 2 mg   propofol (Diprivan) injection 10 mg/mL 200 mg   rocuronium 10 mg/mL 90 mg   tranexamic acid 1,000 mg/100 mL NS (premix) 1,000 mg              Anesthesia Record               Intraprocedure I/O Totals          Intake    PRBC 350.00 mL    Dexmedetomidine 0.00 mL    The total shown is the  total volume documented since Anesthesia Start was filed.    LR bolus 2000.00 mL    Tranexamic Acid 0.00 mL    The total shown is the total volume documented since Anesthesia Start was filed.    Total Intake 2350 mL       Output    Urine 100 mL    Est. Blood Loss 500 mL    Total Output 600 mL       Net    Net Volume 1750 mL          Specimen:   ID Type Source Tests Collected by Time   1 : left femur mass Tissue SOFT TISSUE MASS BIOPSY SURGICAL PATHOLOGY EXAM Wilmer Gardner MD 6/9/2025 1350                 Drains and/or Catheters:   Urethral Catheter Non-latex 16 Fr. (Active)       Tourniquet Times:         Implants:  Implants       Type Name Action Serial No.      Joint CEMENT, BONE SIMPLEX P W/TOBRA - [id number] Implanted      Joint CEMENT, BONE SIMPLEX P W/TOBRA - [id number] Implanted      Joint CEMENT, BONE SIMPLEX P W/TOBRA - [id number] Implanted      Joint CEMENT, BONE SIMPLEX P W/TOBRA - [id number] Implanted      Joint CEMENT, BONE SIMPLEX P W/TOBRA - [id number] Implanted      Joint PIN, HEADLESS 1/8 X 3.5 FLUTE 4/PK STERILE - [id number] Implanted      Joint CABLE/SLEEVE, 2.0 BEAD DALL-MILES VI - [id number] Implanted      Joint Hip STEM, FEMORAL, MODULAR REPLACEMENT SYSTEM, W/O BODY, 17 X 127 MM - [id number] Implanted      Joint STEM EXTENSION, GLOBAL MRS 30MM - [id number] Implanted      Joint FEMORAL STEM, DISTAL STD 65MM LT - [id number] Implanted      Screw STEM, TRIATHLON CEMENTED, 15MM X 50MM - [id number] Implanted       hinge bumper Implanted       hinge bushings and axle Implanted       revision tibial baseplate Implanted       hinge tibial bearing component Implanted       hinge insert Implanted               Findings: As above    Indications: Blanquita Morales is an 74 y.o. 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     The patient was seen in the preoperative area. The risks, benefits, complications, treatment options, non-operative alternatives, expected recovery and outcomes  were discussed with the patient. The possibilities of reaction to medication, pulmonary aspiration, injury to surrounding structures, bleeding, recurrent infection, the need for additional procedures, failure to diagnose a condition, and creating a complication requiring transfusion or operation were discussed with the patient. The patient concurred with the proposed plan, giving informed consent.  The site of surgery was properly noted/marked if necessary per policy. The patient has been actively warmed in preoperative area. Preoperative antibiotics have been ordered and given within 1 hours of incision. 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      Postoperative diagnosis: Same     Procedure:   1.)  Left femur hardware removal (CPT 97298)  2.)  Wide resection left distal femur (CPT 57370)  3.)  Left distal femoral replacement (CPT 96708)     Date of Procedure: 6/9/2025     Attending Surgeon: Wilmer Gardner MD     Assistants: Miguelito Mccormick MD    Anesthesia: General     Estimated blood loss: 350 cc     Intraoperative findings: As above     Components used: [place] standard GMRS body, 30 mm extension, 17 mm x 127 mm cemented stem, size 4 universal tibial baseplate with 15 mm x 50 mm extension, 11 mm polyethylene insert     Indications:     We reviewed the informed consent process and form in the hospital and both signed.  The surgical site was signed and I performed a timeout in the operating room. The patient received prophylactic antibiotics as well as TXA prior to skin incision.  Please note I am including a modifier 22 for the total knee arthroplasty code given that this was a tumor reconstruction with a large distal femoral Moises prosthesis.  This requires increased procedural services, time, as well as difficulty in order to appropriately reconstruct  knee articulation justifying the 22 modifier.     Details of procedure:  Patient was taken to the operating room and placed on the operating table in supine position.  At this point general anesthesia was administered.  After induction the anesthesia team to control the patient cervical spine as well as airway.  All bony prominences were properly identified well-padded.  The left lower extremity was then prepped and draped in sterile orthopedic fashion. Once drapes were in place a timeout procedure was performed confirming appropriate patient identity, surgical site as well as procedure to be performed.  Once all in the room were in agreement we would proceed with the case.    We started by making a midline incision with adequate skin bridge from her previous lateral based incision directly over the anterior aspect the knee extending this approximate around the mid thigh portion.  Skin was incised with 10 blade followed by electrocautery to obtain hemostasis.  We dissected down to the level of the extensor mechanism and then were able to create a standard medial parapatellar approach/arthrotomy to enter the knee joint.  Based off of preoperative CT scan as well as MRI we knew that the posterior aspect of the femur was the primary region where we were concerned about cortical breach but intra-articularly I felt that we were safe.  After making arthrotomy we are then able to create a standard medial peel of the medial face of the tibia.  We then worked on extending our arthrotomy proximally through the extensor mechanism bringing us directly down on the anterior aspect of the distal femur.  We then measured what would be planned for a standard minimal resection which I felt we give us a negative margin given the location of the tumor primarily in the medial condyle.  For this reason we measured 76 mm from the distal articular surface.  We then were able to create medial as well as lateral releases release of the MCL  as well as LCL from within the joint.  We also were able to release the ACL and PCL leaving only the capsule remaining posteriorly.  As we dissected up further medially I was able to release the abductor insertion and released the medial soft tissue structures including medial gastroc all the way up to the 76 mm elizabeth which we have previously placed on the femur with electrocautery.  We then were able to dissect along the lateral aspect of the plate again releasing the lateral head of the gastroc.  We are also able to release along the lateral border the plate.  I felt that I was going to leave the screws into the distal cluster of the femur so that we would not cause contamination given that the screws did enter the area of the tumor within the medial femoral condyle.  For that reason we were able to identify the 4 proximal screws in the plate which were directly visualized and able to be removed under direct visualization with a [employer] screwdriver.  The most proximal screw we were able to visualize the screw but not obtained the trajectory needed to remove it.  For that reason we did make a small stab incision and percutaneously were able to cannulate the screw with our screwdriver through this accessory skin incision to then remove it under direct visualization.  Once all of the screws were out we were then able to subperiosteally dissect along the posterior aspect of the femur at the level of our planned osteotomy with blunt Hohmann's.  Utilizing an oscillating saw we are then able to create our osteotomy.  We are then able to release the plate off the lateral aspect of the femur as we delivered it into the wound this enabled us to essentially extend the distal femur bringing the posterior soft tissue structures under tension and allowing us to perform our posterior dissection.  As we carefully dissected the posterior aspect the femur as we came down closer to the intercondylar notch I was very careful to leave  soft tissue margin on this area as we felt there was a cortical breach based off preoperative imaging.  Eventually metastable to us to continue to dissect down the posterior aspect the femur until we are able to release the posterior capsule and the entire specimen was able to be sent off for permanent section/final pathology.  Evaluation of the wound revealed gross total resection with no evidence of viable tumor remaining.  The wound was thoroughly irrigated with Irrisept solution initially before moving onto the reconstruction portion of the case.  We started by identifying the distal femur and reaming up to a 18 mm reamer for a 17 mm stem.  I felt that despite the fact we were in the metaphysis the color of the stem did sit appropriately on the metaphyseal bone and ultimately we did look at a tibial cone which was actually too large for this patient even with the smallest cone selected.  Once we are satisfied with the preparation of her distal femur we moved onto the tibia.  We are able to place our intramedullary tibial guide and reamed up to a size 16 reamer.  We then were able to place our cutting jig over top of this reamer and appropriately cut approximately 13 mm off the proximal tibia after creating appropriate soft tissue releases circumferentially around the top the tibia.  We then were able to sizes appropriately a size 4 tibial baseplate.  This provisionally pinned in the place as we then were able to prepare our proximal tibia with our boss reamer as well as keel punch.  The tibial baseplate was removed and our trial was inserted.  Similarly we were able to place trials of the distal femur and utilizing an 11 mm trial johnnie I was able to take the knee through range of motion and bring into full extension.  As I took the knee through range of motion I felt that I saw a small crack forming at the end of the distal femur.  Closer examination did reveal that the femoral stem baseplate had cracked the  distal femur and ultimately felt that the bone was too thin in this region to adequately support our stem.  For that reason we decided to resect an additional 3 cm of bone which was done under direct visualization.  This brought us into an area that I felt was a bit more appropriate in terms of getting some cortical contact.  We were able to reream the femur and then placed our trials once again.  We had excellent patellofemoral tracking and I felt that we had restored limb lengths very well.  There is no evidence of loose implants with manual traction.  Fluoroscopic views seem to suggest that we had good position of all components.  For that reason trial components removed a cement was mixed on the back table and the femur as well as proximal tibia were prepared with adequate irrigation.  Final components were opened on the back table.  We then were able to pressurized the femur and insert our femoral stem as well as placed cement around the proximal aspect of her tibial baseplate and insert this in the proximal tibia.  Excess of cement was cleared and we were able to meet the distal femur to the proximal tibia bringing the knee into full extension which set the rotation of our femoral component based off of the rotation of the tibia.  As we held in extension we are able to remove any cement that it came out of the previous screw holes.  We waited for the cement to harden before taking final AP and lateral fluoroscopic views which confirmed good position of orthopedic components.  The wound was thoroughly irrigated with copious Irrisept solution followed by sterile saline.  Vancomycin tobramycin were placed within the wound.  The extensor mechanism was reapproximated multiple 0 Vicryl suture followed by running #1 strata fix to create a watertight closure of the extensor mechanism.  2-0 Vicryl suture was used for subcutaneous tissue followed by staples for skin.  Mepilex dressing was applied.  Patient was awakened  by anesthesia staff without complication and overall tolerated procedure very well.  She was taken to PACU in stable condition.  Post Operative Plan: Patient will be weightbearing as tolerated in the left lower extremity and range of motion as tolerated.  She should be wearing a knee immobilizer when at rest or in bed to prevent contracture but does not require for ambulation.  She will be given 24 hours antibiotics as well as aspirin 81 mg twice daily for DVT prophylaxis in the hospital and at discharge.  Should be discharged on appropriate pain medications.  Follow-up should be in 2 to 4 weeks with left knee as well as left femur films.     Note dictated with K[employer]in  transcription software. Completed without full typed error editing and sent to avoid delay.    Attending Attestation: I was present and scrubbed for the entire procedure.    Wilmer Gardner  Phone Number: [phone]

## 2025-06-09 NOTE — ANESTHESIA PREPROCEDURE EVALUATION
Patient: Blanquita Morales    Procedure Information       Date/Time: 06/09/25 1020    Procedures:       RESECTION LEFT DISTAL FEMUR (Left: Thigh - Leg Upper)      LEFT DFR (Left: Thigh - Leg Upper)    Location: [facility] OR 09 / [facility] OR    Surgeons: Wilmer Gardner MD          Appropriately NPO. Risks/ benefits/ alternatives of anesthetic plan discussed. All questions invited and answered. Patient agrees to proceed as planned and consents to blood transfusion as well as all necessary procedures and invasive monitors.      Relevant Problems   Anesthesia (within normal limits)      Cardiac  Remote hx of one episode of a fib. Has not recurred. Not on AC.    (+) Cardiac arrhythmia   (+) Hyperlipidemia   (+) Hypertension      Pulmonary (within normal limits)      Neuro   (+) Anticipatory anxiety   (+) Anxiety and depression   (+) Impingement of vertebral body syndrome   (+) Lumbar radiculopathy      GI   (+) GERD (gastroesophageal reflux disease)      /Renal (within normal limits)      Liver   (+) Fatty liver disease, nonalcoholic   (+) Hepatic lesion      Endocrine   (+) Class 1 obesity with body mass index (BMI) of 34.0 to 34.9 in adult      Hematology   (+) Anemia      Musculoskeletal   (+) Osteoarthritis of left knee      HEENT   (+) Asymmetrical sensorineural hearing loss   (+) Hearing loss   (+) Sinus disease      ID   (+) Impetigo      Skin   (+) Eczema       Clinical information reviewed:   Tobacco  Allergies  Meds   Med Hx  Surg Hx  OB Status  Fam Hx  Soc   Hx        NPO Detail:  NPO/Void Status  Date of Last Liquid: 06/08/25  Time of Last Liquid: 1800  Date of Last Solid: 06/08/25  Time of Last Solid: 1800  Last Intake Type: Clear fluids         Physical Exam    Airway  Mallampati: IV  TM distance: >3 FB  Neck ROM: full  Mouth opening: 3 or more finger widths     Cardiovascular   Rhythm: regular  Rate: normal     Dental - normal exam     Pulmonary Breath sounds clear to auscultation      Abdominal            Anesthesia Plan    History of general anesthesia?: yes  History of complications of general anesthesia?: no    ASA 3     general     The patient is not a current smoker.    intravenous induction   Postoperative pain plan includes opioids.  Anesthetic plan and risks discussed with patient.  Use of blood products discussed with patient who consented to blood products.    Plan discussed with resident and attending.      Patient was offered  services but she declined. The above conversation regarding anesthetic plan was discussed with daughter as the .

## 2025-06-09 NOTE — DISCHARGE INSTRUCTIONS
Orthopaedic Post-op Instructions     Weight bearing status: Weight bearing as tolerated left lower extremity. Knee immobilizer at night and when in bed to keep leg straight when not in use.     VTE Prophylaxis (Blood Clot Prevention): Aspirin 81 mg twice daily for 6 weeks     Home Medication: Resume all home medications    Resume normal diet     Leave operative dressing in place until post-operative day 7 (6/16/2025) . Then remove and leave incision open to air. Let water run freely over incision when showering, do not scrub. Do not soak in pool or tub. Do not swim in pools or ponds until 3 months after surgery.    Call if any drainage after 7 days, increased redness/warmth/swelling at incision site, pain/tenderness of calf, swelling of calf that does not respond to elevation, SOB/chest pain.    Call for any questions or concerns.     MEDICATION SIDE EFFECTS.  OXYCODONE: constipation, nausea, vomiting, upset stomach, (sleepiness), dizziness, lightheadedness, itching, headache, blurred vision, dry mouth, sweating  TRAMADOL: headache, dizziness, drowsiness, tired feeling; constipation, diarrhea, nausea, vomiting, stomach pain; feeling nervous or anxious, itching, sweating, flushing.  ASPIRIN:  upset stomach, heartburn; drowsiness; or headache    Follow up with Dr. Gardner in 2-3 weeks. Call [phone] to schedule/confirm appointment.

## 2025-06-10 ENCOUNTER — PHARMACY VISIT (OUTPATIENT)
Dept: PHARMACY | Facility: CLINIC | Age: 75
End: 2025-06-10
Payer: COMMERCIAL

## 2025-06-10 ENCOUNTER — HOME HEALTH ADMISSION (OUTPATIENT)
Dept: HOME HEALTH SERVICES | Facility: HOME HEALTH | Age: 75
End: 2025-06-10
Payer: COMMERCIAL

## 2025-06-10 ENCOUNTER — DOCUMENTATION (OUTPATIENT)
Dept: HOME HEALTH SERVICES | Facility: HOME HEALTH | Age: 75
End: 2025-06-10
Payer: COMMERCIAL

## 2025-06-10 VITALS
OXYGEN SATURATION: 94 % | HEART RATE: 102 BPM | TEMPERATURE: 97.9 F | RESPIRATION RATE: 16 BRPM | DIASTOLIC BLOOD PRESSURE: 80 MMHG | SYSTOLIC BLOOD PRESSURE: 143 MMHG

## 2025-06-10 LAB
ANION GAP SERPL CALC-SCNC: 15 MMOL/L (ref 10–20)
BUN SERPL-MCNC: 9 MG/DL (ref 6–23)
CALCIUM SERPL-MCNC: 8.7 MG/DL (ref 8.6–10.6)
CHLORIDE SERPL-SCNC: 105 MMOL/L (ref 98–107)
CO2 SERPL-SCNC: 23 MMOL/L (ref 21–32)
CREAT SERPL-MCNC: 0.52 MG/DL (ref 0.5–1.05)
EGFRCR SERPLBLD CKD-EPI 2021: >90 ML/MIN/1.73M*2
ERYTHROCYTE [DISTWIDTH] IN BLOOD BY AUTOMATED COUNT: 14.6 % (ref 11.5–14.5)
GLUCOSE SERPL-MCNC: 117 MG/DL (ref 74–99)
HCT VFR BLD AUTO: 31.9 % (ref 36–46)
HGB BLD-MCNC: 9.9 G/DL (ref 12–16)
MCH RBC QN AUTO: 27 PG (ref 26–34)
MCHC RBC AUTO-ENTMCNC: 31 G/DL (ref 32–36)
MCV RBC AUTO: 87 FL (ref 80–100)
NRBC BLD-RTO: 0 /100 WBCS (ref 0–0)
PLATELET # BLD AUTO: 227 X10*3/UL (ref 150–450)
POTASSIUM SERPL-SCNC: 3.9 MMOL/L (ref 3.5–5.3)
RBC # BLD AUTO: 3.66 X10*6/UL (ref 4–5.2)
SODIUM SERPL-SCNC: 139 MMOL/L (ref 136–145)
WBC # BLD AUTO: 8.9 X10*3/UL (ref 4.4–11.3)

## 2025-06-10 PROCEDURE — 99231 SBSQ HOSP IP/OBS SF/LOW 25: CPT

## 2025-06-10 PROCEDURE — 85027 COMPLETE CBC AUTOMATED: CPT

## 2025-06-10 PROCEDURE — 97530 THERAPEUTIC ACTIVITIES: CPT | Mod: GP

## 2025-06-10 PROCEDURE — 36415 COLL VENOUS BLD VENIPUNCTURE: CPT

## 2025-06-10 PROCEDURE — RXMED WILLOW AMBULATORY MEDICATION CHARGE

## 2025-06-10 PROCEDURE — 97162 PT EVAL MOD COMPLEX 30 MIN: CPT | Mod: GP

## 2025-06-10 PROCEDURE — 97530 THERAPEUTIC ACTIVITIES: CPT | Mod: GO

## 2025-06-10 PROCEDURE — 2500000004 HC RX 250 GENERAL PHARMACY W/ HCPCS (ALT 636 FOR OP/ED)

## 2025-06-10 PROCEDURE — 2500000001 HC RX 250 WO HCPCS SELF ADMINISTERED DRUGS (ALT 637 FOR MEDICARE OP)

## 2025-06-10 PROCEDURE — 97165 OT EVAL LOW COMPLEX 30 MIN: CPT | Mod: GO

## 2025-06-10 PROCEDURE — 80048 BASIC METABOLIC PNL TOTAL CA: CPT

## 2025-06-10 RX ORDER — ASPIRIN 81 MG/1
81 TABLET ORAL 2 TIMES DAILY
Qty: 60 TABLET | Refills: 0 | Status: SHIPPED | OUTPATIENT
Start: 2025-06-10 | End: 2025-07-10

## 2025-06-10 RX ORDER — TRAMADOL HYDROCHLORIDE 50 MG/1
50 TABLET, FILM COATED ORAL EVERY 6 HOURS PRN
Qty: 28 TABLET | Refills: 0 | Status: SHIPPED | OUTPATIENT
Start: 2025-06-10 | End: 2025-06-17

## 2025-06-10 RX ORDER — PANTOPRAZOLE SODIUM 40 MG/1
40 TABLET, DELAYED RELEASE ORAL DAILY
Qty: 30 TABLET | Refills: 0 | Status: SHIPPED | OUTPATIENT
Start: 2025-06-10 | End: 2025-07-10

## 2025-06-10 RX ORDER — ACETAMINOPHEN 500 MG
1000 TABLET ORAL EVERY 6 HOURS PRN
Qty: 240 TABLET | Refills: 0 | Status: SHIPPED | OUTPATIENT
Start: 2025-06-10 | End: 2025-07-10

## 2025-06-10 RX ORDER — DOCUSATE SODIUM 100 MG/1
100 CAPSULE, LIQUID FILLED ORAL 2 TIMES DAILY
Qty: 60 CAPSULE | Refills: 0 | Status: SHIPPED | OUTPATIENT
Start: 2025-06-10 | End: 2025-07-10

## 2025-06-10 RX ORDER — MELOXICAM 15 MG/1
15 TABLET ORAL DAILY
Qty: 30 TABLET | Refills: 0 | Status: SHIPPED | OUTPATIENT
Start: 2025-06-10 | End: 2025-07-10

## 2025-06-10 RX ORDER — POLYETHYLENE GLYCOL 3350 17 G/17G
17 POWDER, FOR SOLUTION ORAL DAILY
Qty: 30 PACKET | Refills: 0 | Status: SHIPPED | OUTPATIENT
Start: 2025-06-10 | End: 2025-07-10

## 2025-06-10 RX ORDER — OXYCODONE HYDROCHLORIDE 5 MG/1
5 TABLET ORAL EVERY 6 HOURS PRN
Qty: 28 TABLET | Refills: 0 | Status: SHIPPED | OUTPATIENT
Start: 2025-06-10 | End: 2025-06-17

## 2025-06-10 RX ADMIN — ACETAMINOPHEN 650 MG: 325 TABLET, FILM COATED ORAL at 09:14

## 2025-06-10 RX ADMIN — CEFAZOLIN SODIUM 2 G: 2 INJECTION, SOLUTION INTRAVENOUS at 04:07

## 2025-06-10 RX ADMIN — ASPIRIN 81 MG: 81 TABLET, COATED ORAL at 09:14

## 2025-06-10 RX ADMIN — SODIUM CHLORIDE, SODIUM LACTATE, POTASSIUM CHLORIDE, AND CALCIUM CHLORIDE 500 ML: .6; .31; .03; .02 INJECTION, SOLUTION INTRAVENOUS at 12:34

## 2025-06-10 RX ADMIN — Medication 1 TABLET: at 09:14

## 2025-06-10 RX ADMIN — ACETAMINOPHEN 650 MG: 325 TABLET, FILM COATED ORAL at 02:30

## 2025-06-10 RX ADMIN — Medication 125 MCG: at 09:14

## 2025-06-10 RX ADMIN — ACETAMINOPHEN 650 MG: 325 TABLET, FILM COATED ORAL at 15:18

## 2025-06-10 ASSESSMENT — COGNITIVE AND FUNCTIONAL STATUS - GENERAL
PERSONAL GROOMING: A LITTLE
MOVING TO AND FROM BED TO CHAIR: A LITTLE
DRESSING REGULAR LOWER BODY CLOTHING: A LITTLE
WALKING IN HOSPITAL ROOM: A LITTLE
HELP NEEDED FOR BATHING: A LITTLE
TURNING FROM BACK TO SIDE WHILE IN FLAT BAD: A LITTLE
STANDING UP FROM CHAIR USING ARMS: A LITTLE
MOBILITY SCORE: 18
DAILY ACTIVITIY SCORE: 20
MOVING FROM LYING ON BACK TO SITTING ON SIDE OF FLAT BED WITH BEDRAILS: A LITTLE
TOILETING: A LITTLE
CLIMB 3 TO 5 STEPS WITH RAILING: A LITTLE

## 2025-06-10 ASSESSMENT — PAIN SCALES - GENERAL
PAINLEVEL_OUTOF10: 7
PAINLEVEL_OUTOF10: 7
PAINLEVEL_OUTOF10: 5 - MODERATE PAIN
PAINLEVEL_OUTOF10: 7
PAINLEVEL_OUTOF10: 5 - MODERATE PAIN

## 2025-06-10 ASSESSMENT — PAIN - FUNCTIONAL ASSESSMENT
PAIN_FUNCTIONAL_ASSESSMENT: 0-10

## 2025-06-10 ASSESSMENT — PAIN DESCRIPTION - DESCRIPTORS: DESCRIPTORS: ACHING;SORE

## 2025-06-10 ASSESSMENT — ACTIVITIES OF DAILY LIVING (ADL): LACK_OF_TRANSPORTATION: NO

## 2025-06-10 NOTE — CARE PLAN
The patient's goals for the shift include  Control Pain     The clinical goals for the shift include Patient will remain comfortable throughout shift.      Problem: Pain - Adult  Goal: Verbalizes/displays adequate comfort level or baseline comfort level  Outcome: Progressing     Problem: Safety - Adult  Goal: Free from fall injury  Outcome: Progressing     Problem: Chronic Conditions and Co-morbidities  Goal: Patient's chronic conditions and co-morbidity symptoms are monitored and maintained or improved  Outcome: Progressing

## 2025-06-10 NOTE — PROGRESS NOTES
ORTHOPAEDIC SURGERY PROGRESS NOTE      Blanquita Morales is a 74 y.o. female on day 1 of admission presenting with Bone, giant cell tumor, now s/p left distal femur replacement on 6/9/2025 with Dr. Gardner.     Subjective   Seen and examined postoperatively. NAEO. AFVSS. NAD, pain well controlled. No numbness/tingling/weakness. No coldness or pallor of extremity. No skin tenting. No fevers, chills, SOB, N, V.    Understands and agrees with plan        Objective     Left lower extremity:  - Dressing c.d.i  - Tender to palpation over L knee   - Compartments soft and compressible  - fires EHL/DF/PF.  - SILT in Marc/Sa/SP/DP/T distribution.  - Palpable DP pulse      Last Recorded Vitals  Blood pressure 118/74, pulse 102, temperature 35.6 °C (96 °F), temperature source Temporal, resp. rate 15, SpO2 93%.    Relevant Results  Results for orders placed or performed during the hospital encounter of 06/09/25 (from the past 24 hours)   Type And Screen   Result Value Ref Range    ABO TYPE A     Rh TYPE POS     ANTIBODY SCREEN NEG    Prepare RBC: 2 Units   Result Value Ref Range    PRODUCT CODE Z2548T87     Unit Number [id number]     Unit ABO A     Unit RH POS     XM INTEP COMP     Dispense Status TR     Blood Expiration Date 6/20/2025 11:59:00 PM EDT     PRODUCT BLOOD TYPE 6200     UNIT VOLUME 325     PRODUCT CODE J3884I21     Unit Number [id number]     Unit ABO A     Unit RH POS     XM INTEP COMP     Dispense Status XM     Blood Expiration Date 6/20/2025 11:59:00 PM EDT     PRODUCT BLOOD TYPE 6200     UNIT VOLUME 279    CBC   Result Value Ref Range    WBC 13.8 (H) 4.4 - 11.3 x10*3/uL    nRBC 0.0 0.0 - 0.0 /100 WBCs    RBC 4.50 4.00 - 5.20 x10*6/uL    Hemoglobin 12.2 12.0 - 16.0 g/dL    Hematocrit 39.1 36.0 - 46.0 %    MCV 87 80 - 100 fL    MCH 27.1 26.0 - 34.0 pg    MCHC 31.2 (L) 32.0 - 36.0 g/dL    RDW 14.3 11.5 - 14.5 %    Platelets 222 150 - 450 x10*3/uL       Assessment/Plan   Assessment & Plan  Bone, giant cell  tumor        Assessment and Plan  74 y.o. 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 Gardner.     Plan:  - Weight-bearing status:    - WBAT LLE   - Knee immobilzer when resting in bed and at night to prevent flexion contracture   - Feeding: Regular diet as tolerated, bowel regimen  - Analgesia: Tylenol 650mg, oxy 5mg/10mg, Dilaudid 0.2mg   - Volume: mIV @ at LR 50 cc/hr, for 24 hours HLIVF when tolerating PO, monitor BMP  - OT/PT: Consulted  - Respiratory: Encourage IS, maintain O2 sat >92%  - Infection: Carolyne-operative Ancef for 24 hours, afebrile   - Lines: Maintain PIVx2 while inpatient  - Drains: None   - Damico: Remove after up with PT   - Embolic ppx: SCDs, ASA 81mg BID POD1 for 6 weeks   - Transfusion: Trend CBC daily, no indication for transfusion  - Cardiac: No issues  - Glycemic: No issues  - C/w home medications  - Dispo pending PT, pain control    Plan was discussed with attending Dr. Jj Mccormick MD  Orthopedic Surgery, PGY4  P: [id number] (Epic Chat Preferred)

## 2025-06-10 NOTE — PROGRESS NOTES
06/10/25 1102   Discharge Planning   Living Arrangements Children  (Patient lives with son Abiel. Discharge planning assesment completed with patient and her daughter Liz.)   Support Systems Children   Assistance Needed Pt has a HHA that assists with cooking cleaning, bathing. Pts daughter assists as needed.   Type of Residence Private residence   Number of Stairs to Enter Residence 5   Number of Stairs Within Residence 0   Who is requesting discharge planning? Provider   Home or Post Acute Services In home services   Type of Home Care Services Home OT;Home PT   Expected Discharge Disposition Home H   Does the patient need discharge transport arranged? No   Financial Resource Strain   How hard is it for you to pay for the very basics like food, housing, medical care, and heating? Not very   Housing Stability   In the last 12 months, was there a time when you were not able to pay the mortgage or rent on time? N   In the past 12 months, how many times have you moved where you were living? 0   At any time in the past 12 months, were you homeless or living in a shelter (including now)? N   Transportation Needs   In the past 12 months, has lack of transportation kept you from medical appointments or from getting medications? no   In the past 12 months, has lack of transportation kept you from meetings, work, or from getting things needed for daily living? No     Met with pt and introduced myself as Care Coordinator with Care Transitions Team for Discharge Planning. Pt stated she feels safe at home. Pts family drives her to drs appts.  Pt's address, phone number and contact information was verified. Pt denies any social or financial concerns at this time. Family to provide transportation home at the time of discharge.   Diabetic: no  Hemodialysis: no  Home Healthcare: [facility] (HHA 2hrs/day, 7 days/ week). Assists with cooking cleaning, bathing.   DME: Pt owns a FWW, wheelchair, canes x2, bedside commode,  shower bench, grab bars.  PCP:  Albert Carrera, virtual appt last Wednesday.   Marion Addison, RN, BSN  Transitional Care Coordinator   Office: [phone]  Secure chat via Haiku

## 2025-06-10 NOTE — PROGRESS NOTES
Physical Therapy    Physical Therapy Evaluation    Patient Name: Blanquita Morales  MRN: [id number]  Department: [place] [address]  Room: [facility]  Today's Date: 6/10/2025   Time Calculation  Start Time: 0923  Stop Time: 0950  Time Calculation (min): 27 min    Assessment/Plan   PT Assessment  PT Assessment Results: Decreased strength, Decreased range of motion, Decreased endurance, Impaired balance, Decreased mobility  Rehab Prognosis: Excellent  Barriers to Discharge Home: No anticipated barriers  Evaluation/Treatment Tolerance: Patient tolerated treatment well  End of Session Communication: Bedside nurse  Assessment Comment: 74 y.o. F s/p extensive L TKR with distal femur replacement currently demonstrating impaired strength, balance, and function to that of baseline. Pt will benefit from continued PT in house and after discharge at LOW intensity.  End of Session Patient Position: Bed, 3 rail up, Alarm off, caregiver present  IP OR SWING BED PT PLAN  Inpatient or Swing Bed: Inpatient  PT Plan  Treatment/Interventions: Bed mobility, Transfer training, Gait training, Balance training, Strengthening, Therapeutic exercise, Therapeutic activity  PT Plan: Ongoing PT  PT Eval Only Reason: At baseline function  PT Frequency: Daily  PT Discharge Recommendations: Low intensity level of continued care  PT Recommended Transfer Status: Contact guard  PT - OK to Discharge: Yes    Subjective     PT Visit Info:  PT Received On: 06/10/25  General Visit Information:  General  Reason for Referral: bone tumor/mass  Past Medical History Relevant to Rehab: 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 Extremely helpful throughout duration of visit providing interpretation with pt as pt is Albanian speaking. Providing positive interaction/feedback during visit.  Prior to Session Communication:  "Bedside nurse (OT)  Patient Position Received: Up in chair, Alarm off, caregiver present  Preferred Learning Style: verbal, visual, auditory  General Comment: Pt in chair upon entry. Beginning to experience increased pain/fatigue. Willing to work with PT then assisted back to supine due to rest post visit.  Home Living:  Home Living  Type of Home: House  Lives With:  (Family)  Home Adaptive Equipment: Walker rolling or standard, Cane, Wheelchair-manual (bedside commode)  Home Layout: One level  Home Access: Ramped entrance  Prior Level of Function:  Prior Function Per Pt/Caregiver Report  Level of [place]: Independent with ADLs and functional transfers, Independent with homemaking with ambulation  Receives Help From: Family  Ambulatory Assistance: Independent  Vocational: Retired  Precautions:  Precautions  LE Weight Bearing Status:  (WBAT.)  Medical Precautions: Fall precautions  Precautions Comment: Per MD notes: WBAT LLE - Knee immobilzer when resting in bed and at night to prevent flexion contracture. Inquired for further clarifcation and MD responded, \"Immobilizer is only for bed. These big distal femur replacements can get flexion contractures so Dr. Gardner likes to have it one when she is not up and moving around. Otherwise she can have it off and WBAT when she is active.\" Discussed in detail with daughter who relayed to patient. Daughter and patient verbalized understanding.     Objective   Pain:  Pain Assessment  Pain Assessment: 0-10  0-10 (Numeric) Pain Score: 7 (With mobility. 3/10 pain when at rest.)  Pain Type: Acute pain, Surgical pain  Pain Location: Knee  Pain Orientation: Left  Pain Interventions:  (Pt previously medicated by RN.)  Cognition:  Cognition  Overall Cognitive Status: Within Functional Limits  Orientation Level: Oriented X4  Insight: Within function limits  Impulsive: Within functional limits    General Assessments:     Activity Tolerance  Endurance: Decreased tolerance for upright " activites, Endurance does not limit participation in activity  Activity Tolerance Comments: Demonstrated good tolerance for POD1    Sensation  Light Touch: No apparent deficits    Strength  Strength Comments: RLE WFL. LLE grossly 2/5 hip flexion. Knee immobilized donned during PT eval, unable to assess flexor/extensor strength  Strength  Strength Comments: RLE WFL. LLE grossly 2/5 hip flexion. Knee immobilized donned during PT eval, unable to assess flexor/extensor strength    Coordination  Movements are Fluid and Coordinated: Yes    Postural Control  Postural Control: Within Functional Limits    Static Sitting Balance  Static Sitting-Balance Support: Feet supported  Static Sitting-Level of Assistance: Contact guard    Static Standing Balance  Static Standing-Balance Support: Bilateral upper extremity supported (through a wheeled walker)  Static Standing-Level of Assistance: Contact guard  Functional Assessments:     Bed Mobility  Bed Mobility: Yes  Bed Mobility 1  Bed Mobility 1: Sitting to supine  Level of Assistance 1: Minimum assistance  Bed Mobility Comments 1: With LLE    Transfers  Transfer: Yes  Transfer 1  Transfer From 1: Sit to, Stand to  Transfer to 1: Stand, Sit  Transfer Device 1: Walker  Transfer Level of Assistance 1: Contact guard    Ambulation/Gait Training  Ambulation/Gait Training Performed: Yes  Ambulation/Gait Training 1  Surface 1: Level tile  Device 1: Rolling walker  Assistance 1: Minimum assistance, Minimal verbal cues, Minimal tactile cues  Quality of Gait 1: Inconsistent stride length, Decreased step length, Shuffling gait, Forward flexed posture  Comments/Distance (ft) 1: 5ft    Outcome Measures:  [facility] Basic Mobility  Turning from your back to your side while in a flat bed without using bedrails: A little  Moving from lying on your back to sitting on the side of a flat bed without using bedrails: A little  Moving to and from bed to chair (including a wheelchair): A little  Standing up  from a chair using your arms (e.g. wheelchair or bedside chair): A little  To walk in hospital room: A little  Climbing 3-5 steps with railing: A little  Basic Mobility - Total Score: 18    Encounter Problems       Encounter Problems (Active)       Mobility       STG - Patient will ambulate >50ft, wheeled walker, stand by assist       Start:  06/10/25    Expected End:  06/17/25            STG - Patient will ascend and descend four to six stairs stand by assist       Start:  06/10/25    Expected End:  06/17/25               PT Transfers       STG - Patient to transfer to and from sit to supine stand by assist       Start:  06/10/25    Expected End:  06/17/25            STG - Patient will transfer sit to and from stand stand by assist       Start:  06/10/25    Expected End:  06/17/25               Pain - Adult              Education Documentation  Precautions, taught by Taj Glaser, PT at 6/10/2025 10:49 AM.  Learner: Patient  Readiness: Acceptance  Method: Explanation  Response: Verbalizes Understanding    Mobility Training, taught by Taj Glaser PT at 6/10/2025 10:49 AM.  Learner: Patient  Readiness: Acceptance  Method: Explanation  Response: Verbalizes Understanding    Education Comments  No comments found.

## 2025-06-10 NOTE — CARE PLAN
Problem: Pain - Adult  Goal: Verbalizes/displays adequate comfort level or baseline comfort level  Outcome: Progressing     Problem: Safety - Adult  Goal: Free from fall injury  Outcome: Progressing     Problem: Discharge Planning  Goal: Discharge to home or other facility with appropriate resources  Outcome: Progressing     Problem: Chronic Conditions and Co-morbidities  Goal: Patient's chronic conditions and co-morbidity symptoms are monitored and maintained or improved  Outcome: Progressing     Problem: Nutrition  Goal: Nutrient intake appropriate for maintaining nutritional needs  Outcome: Progressing   The patient's goals for the shift include      The clinical goals for the shift include Patient will remain comfortable throughout shift.

## 2025-06-10 NOTE — PROGRESS NOTES
Postop Pain HPI -   Palliative: relieved with IV analgesics and regional local anesthetics  Provocative: movement  Quality:  burning and aching  Radiation:  none  Severity:  ranges from 3-7/10  Timing: constant    24-HOUR OPIOID CONSUMPTION:  N/a    Scheduled medications  Scheduled Medications[1]  Continuous medications  Continuous Medications[2]  PRN medications  PRN Medications[3]     Physical Exam:  Constitutional:  no distress, alert and cooperative  Eyes: clear sclera  Head/Neck: No apparent injury, trachea midline  Respiratory/Thorax: Patent airways, thorax symmetric, breathing comfortably  Cardiovascular: no pitting edema  Gastrointestinal: Nondistended  Musculoskeletal: ROM intact  Extremities: no clubbing  Neurological: alert, lama x4  Psychological: Appropriate affect    Results for orders placed or performed during the hospital encounter of 06/09/25 (from the past 24 hours)   Prepare RBC: 2 Units   Result Value Ref Range    PRODUCT CODE C3506T48     Unit Number [id number]     Unit ABO A     Unit RH POS     XM INTEP COMP     Dispense Status TR     Blood Expiration Date 6/20/2025 11:59:00 PM EDT     PRODUCT BLOOD TYPE 6200     UNIT VOLUME 325     PRODUCT CODE C1751D91     Unit Number [id number]     Unit ABO A     Unit RH POS     XM INTEP COMP     Dispense Status XM     Blood Expiration Date 6/20/2025 11:59:00 PM EDT     PRODUCT BLOOD TYPE 6200     UNIT VOLUME 279    CBC   Result Value Ref Range    WBC 13.8 (H) 4.4 - 11.3 x10*3/uL    nRBC 0.0 0.0 - 0.0 /100 WBCs    RBC 4.50 4.00 - 5.20 x10*6/uL    Hemoglobin 12.2 12.0 - 16.0 g/dL    Hematocrit 39.1 36.0 - 46.0 %    MCV 87 80 - 100 fL    MCH 27.1 26.0 - 34.0 pg    MCHC 31.2 (L) 32.0 - 36.0 g/dL    RDW 14.3 11.5 - 14.5 %    Platelets 222 150 - 450 x10*3/uL   CBC   Result Value Ref Range    WBC 8.9 4.4 - 11.3 x10*3/uL    nRBC 0.0 0.0 - 0.0 /100 WBCs    RBC 3.66 (L) 4.00 - 5.20 x10*6/uL    Hemoglobin 9.9 (L) 12.0 - 16.0 g/dL    Hematocrit 31.9 (L) 36.0  - 46.0 %    MCV 87 80 - 100 fL    MCH 27.0 26.0 - 34.0 pg    MCHC 31.0 (L) 32.0 - 36.0 g/dL    RDW 14.6 (H) 11.5 - 14.5 %    Platelets 227 150 - 450 x10*3/uL   Basic metabolic panel   Result Value Ref Range    Glucose 117 (H) 74 - 99 mg/dL    Sodium 139 136 - 145 mmol/L    Potassium 3.9 3.5 - 5.3 mmol/L    Chloride 105 98 - 107 mmol/L    Bicarbonate 23 21 - 32 mmol/L    Anion Gap 15 10 - 20 mmol/L    Urea Nitrogen 9 6 - 23 mg/dL    Creatinine 0.52 0.50 - 1.05 mg/dL    eGFR >90 >60 mL/min/1.73m*2    Calcium 8.7 8.6 - 10.6 mg/dL      Blanquita Morales is a 74 y.o. year old female patient who presents for left distal femur resection with Dr. Gardner on 6/9. Acute Pain consulted for block for postoperative pain control.      Plan:     - Left femoral nerve single shot block performed preoperatively on 6/9  - Pain medications per primary team  - Encouraged pt to take narcotics as needed in the acute post op period  - Acute pain will sign off     Acute Pain Team  pg [phone] ph [phone].           [1] acetaminophen, 650 mg, oral, q6h NEO  aspirin, 81 mg, oral, BID  calcium carbonate-vitamin D3, 1 tablet, oral, Daily  cholecalciferol, 125 mcg, oral, Daily  docusate sodium, 100 mg, oral, BID  ketorolac, 15 mg, intravenous, q6h  polyethylene glycol, 17 g, oral, Daily  [2] lactated Ringer's, 50 mL/hr, Last Rate: 50 mL/hr (06/09/25 1830)  oxygen, 2 L/min  [3] PRN medications: benzocaine-menthol, bisacodyl, bisacodyl, cyclobenzaprine, HYDROmorphone, meclizine, metoclopramide **OR** metoclopramide, naloxone, ondansetron **OR** ondansetron, oxyCODONE, oxyCODONE, pantoprazole, scopolamine

## 2025-06-10 NOTE — PROGRESS NOTES
Occupational Therapy    Evaluation/Treatment    Patient Name: Blanquita Morales  MRN: [id number]  Department: [place] [address]  Room: [facility]91  Today's Date: 06/10/25  Time Calculation  Start Time: 0840  Stop Time: 0903  Time Calculation (min): 23 min       Assessment:  OT Assessment: difficulty I/ADLS, safety, fxnl mob  Prognosis: Good  Barriers to Discharge Home: No anticipated barriers  Evaluation/Treatment Tolerance: Patient tolerated treatment well  Medical Staff Made Aware: Yes  End of Session Communication: Bedside nurse  End of Session Patient Position: Up in chair, Alarm off, caregiver present  OT Assessment Results: Decreased ADL status, Decreased endurance, Decreased functional mobility, Decreased IADLs  Prognosis: Good  Evaluation/Treatment Tolerance: Patient tolerated treatment well  Medical Staff Made Aware: Yes  Strengths: Attitude of self, Support of Caregivers, Housing layout  Barriers to Participation: Comorbidities  Plan:  Treatment Interventions: ADL retraining, Functional transfer training, Endurance training, Patient/family training, Equipment evaluation/education, Compensatory technique education  OT Frequency: 2 times per week  OT Discharge Recommendations: Low intensity level of continued care  Equipment Recommended upon Discharge:  (hip kit)  OT Recommended Transfer Status: Assist of 1  OT - OK to Discharge: Yes  Treatment Interventions: ADL retraining, Functional transfer training, Endurance training, Patient/family training, Equipment evaluation/education, Compensatory technique education    Subjective   Current Problem:  1. Acute post-operative pain  Referral to Home Health    polyethylene glycol (Glycolax, Miralax) 17 gram packet    aspirin 81 mg EC tablet    meloxicam (Mobic) 15 mg tablet    pantoprazole (ProtoNix) 40 mg EC tablet    docusate sodium (Colace) 100 mg capsule    traMADol (Ultram) 50 mg tablet    oxyCODONE (Roxicodone) 5 mg immediate release tablet    acetaminophen (Tylenol Extra  Strength) 500 mg tablet      2. Bone, giant cell tumor  Admit to inpatient    Pulse oximetry, spot    Admit to inpatient    Pulse oximetry, spot    Surgical Pathology Exam    Surgical Pathology Exam    CANCELED: Full code    CANCELED: Vital Signs    CANCELED: Full code    CANCELED: Vital Signs        OT Visit Info:  OT Received On: 06/10/25  General Visit Info:   General  Reason for Referral: Bone, giant cell tumor, now s/p left distal femur replacement  Past Medical History Relevant to Rehab: continued left knee pain. Patient had a pathological fracture of her left distal femur that was treated with open biopsy, curettage and plate fixation in November of 2023  Family/Caregiver Present: Yes  Caregiver Feedback: dtr present interpreting  Prior to Session Communication: Bedside nurse  Patient Position Received: Bed, 3 rail up, Alarm off, caregiver present  General Comment: Ecuadorean speaking   Precautions:  LE Weight Bearing Status:  (WBAT LLE)  Precautions Comment: WBAT with knee immobilizer to be worn while in bed with use of walker for assistance with ambulation            Pain:  Pain Assessment  Pain Assessment: 0-10  0-10 (Numeric) Pain Score: 7  Pain Location:  (LLE)    Objective   Cognition:  Overall Cognitive Status: Within Functional Limits  Orientation Level: Oriented X4  Insight: Within function limits           Home Living:  Type of Home: House  Lives With:  (son, son and dtr rotating to A)  Home Adaptive Equipment:  (WhW, wheelchair, cane, recliner, reacher, rampshower chair, BSC)  Home Layout:  (5 KRISTIAN or ramp to enter, 1st floor setup)  Bathroom Shower/Tub: Tub/shower unit  Bathroom Toilet:  (BSC)  Bathroom Equipment:  (shower chair, grab bars)  Prior Function:  Level of [place]: Independent with ADLs and functional transfers, Independent with homemaking with ambulation  Receives Help From: Family  Ambulatory Assistance: Independent  Vocational: Retired  Leisure: cooking, grandkids  Hand Dominance:  Right  IADL History:  IADL Comments: I I/ADLs, +drive, =dog  ADL:  Eating Assistance: Independent  Grooming Assistance:  (CGA anticipated standing)  Bathing Assistance:  (min A anticipated AE) OT educated seated bathing with LLE bagged and/or sponge bathing with AE 2/2 LLE ace wrap and KI   UE Dressing Assistance: Independent  LE Dressing Assistance: Minimal OT educated and demo'd use of AE reacher and sock aid as needed for LBD ce with KI.   Toileting Assistance with Device:  (CGA anticipated WhW)  Functional Deficit: Setup, Steadying, Verbal cueing, Supervision/safety, Increased time to complete    Activity Tolerance:  Endurance:  (fair)  Bed Mobility/Transfers: Bed Mobility  Bed Mobility:  (sup to sit CGA extended time required)    Transfers  Transfer:  (sit/stand CGA WHW, stand to sit chair min  A WhW)      Functional Mobility:  Functional Mobility  Functional Mobility Performed:  (pt performed fxnl mob to/from bed in room to chair CGA-MIN WHW)  Sitting Balance:  Dynamic Sitting Balance  Dynamic Sitting-Level of Assistance: Independent  Standing Balance:  Dynamic Standing Balance  Dynamic Standing-Level of Assistance: Contact guard       Vision:Vision - Basic Assessment  Current Vision: Wears glasses all the time  Sensation:  Light Touch: No apparent deficits  Strength:  Strength Comments: BUE WFL    Coordination:  Movements are Fluid and Coordinated: Yes   Hand Function:  Hand Function  Gross Grasp: Functional  Extremities: RUE   RUE : Within Functional Limits and LUE   LUE: Within Functional Limits      Outcome Measures: [facility] Daily Activity  Putting on and taking off regular lower body clothing: A little  Bathing (including washing, rinsing, drying): A little  Putting on and taking off regular upper body clothing: None  Toileting, which includes using toilet, bedpan or urinal: A little  Taking care of personal grooming such as brushing teeth: A little  Eating Meals: None  Daily Activity - Total Score:  20         and OT Adult Other Outcome Measures  4AT: -    Education Documentation  ADL Training, taught by Va Majano OT at 6/10/2025 10:19 AM.  Learner: Family, Patient  Readiness: Acceptance  Method: Explanation  Response: Verbalizes Understanding, Needs Reinforcement  Comment: FXNL MOB ADL AE    Body Mechanics, taught by Va Majano OT at 6/10/2025 10:19 AM.  Learner: Family, Patient  Readiness: Acceptance  Method: Explanation  Response: Verbalizes Understanding, Needs Reinforcement  Comment: FXNL MOB ADL AE    Precautions, taught by Va Majano OT at 6/10/2025 10:19 AM.  Learner: Family, Patient  Readiness: Acceptance  Method: Explanation  Response: Verbalizes Understanding, Needs Reinforcement  Comment: FXNL MOB ADL AE    Education Comments  No comments found.    OT educated on pet care with dog and cat, infection prevention and fall prevention.    Goals:  Encounter Problems       Encounter Problems (Active)       ADLs       Patient will perform UB and LB bathing  with modified independent level of assistance and ae. (Progressing)       Start:  06/10/25    Expected End:  07/01/25            Patient with complete upper body dressing with independent level of assistance  (Progressing)       Start:  06/10/25    Expected End:  07/01/25            Patient with complete lower body dressing with modified independent level of assistance donning and doffing all LE clothes  with PRN adaptive equipment (Progressing)       Start:  06/10/25    Expected End:  07/01/25            Patient will complete daily grooming tasks  with independent level of assistance  (Progressing)       Start:  06/10/25    Expected End:  07/01/25            Patient will complete toileting including hygiene clothing management/hygiene with modified independent level of assistance and LRD. (Progressing)       Start:  06/10/25    Expected End:  07/01/25               MOBILITY       Patient will perform Functional mobility max  Household distances/Community Distances with modified independent level of assistance and least restrictive device in order to improve safety and functional mobility. (Progressing)       Start:  06/10/25    Expected End:  07/01/25               TRANSFERS       Patient will perform bed mobility independent level of assistance  (Progressing)       Start:  06/10/25    Expected End:  07/01/25            Patient will complete functional transfer least restrictive device with modified independent level of assistance. (Progressing)       Start:  06/10/25    Expected End:  07/01/25

## 2025-06-10 NOTE — PROGRESS NOTES
06/10/25 1335   Discharge Planning   Expected Discharge Disposition Home H   Patient Choice   Provider Choice list and CMS website (https://medicare.gov/care-compare#search) for post-acute Quality and Resource Measure Data were provided and reviewed with: Family;Patient     Transitional Care Coordination Progress Note:  This nurse met with the patient and her daughter to discuss discharge planning needs, notified of PT/OT recommendation for low intensity therapy, freedom of choice explained and HC list provided.   No agency of choice, [facility] is agency of choice.    Addendum 1337:  [facility] confirmed SOC for 24-48 hours. Patients daughter, MD Kiran and pts bedside nurse notified.     Marion Addison, RN, BSN  Transitional Care Coordinator  Office: [phone]  Secure chat via [employer]iku

## 2025-06-10 NOTE — DISCHARGE SUMMARY
Dr. Pearl  Phone: [phone]                Discharge Summary    Discharge Diagnosis  Left Total Knee Arthroplasty     Issues Requiring Follow-Up  Home care services to start within 48 hours. Outpatient PT to start 2 weeks  S/P total Joint for Knees only. Hips optional.    Test Results Pending At Discharge  Pending Labs       No current pending labs.          Hospital Course  Patient underwent Left Total Knee Arthroplasty  on 6/9/2025 without complications. The patient was then taken to the PACU in stable condition. Patient was then transferred to the [facility].  Pain was appropriately controlled. Diet was advanced as tolerated. Patient progressed adequately through their recovery during hospital stay including PT/ OT and were recommended for discharge to home. Patient was then discharged on  to home with home care in stable condition. Patient had uneventful hospital course. Patient was instructed on the use of pain medications as needed for pain. The signs and symptoms of infection were discussed and the patient was given our number to call should they have any questions or concerns following discharge.    Based on my clinical judgment, the patient was provided with a 7-day prescription for opioid medication at 30 MED, indicated for treatment of acute pain in the setting of recent Total Joint Arthroplasty. OARRS report was run and has demonstrated an appropriate time course.  The patient has been provided with counseling pertaining to safe use of opioid medication.    Patient may use operative extremity WBAT with knee immobilizer to be worn while in bed with use of walker for assistance with ambulation .  Mepilex dressing to be removed POD # 7 by home care and incision left open to air  OAC for DVT prophylaxis started on POD #1 and to be taken for 30 days    Patient is to follow-up in 2-3 weeks at scheduled post-op visit.     Face-to Face after surgery progress note  Pertinent Physical Exam At Time of  Discharge  Review of Systems   Constitutional: Negative.  Negative for activity change, chills, fatigue and fever.   HENT: Negative.     Eyes: Negative.    Respiratory: Negative.  Negative for cough, chest tightness, shortness of breath and wheezing.    Cardiovascular: Negative.  Negative for palpitations.   Gastrointestinal:  Negative for abdominal pain, blood in stool, nausea and vomiting.   Endocrine: Negative.  Negative for cold intolerance and polyuria.   Genitourinary: Negative.  Negative for difficulty urinating, dysuria, frequency, hematuria and urgency.   Musculoskeletal:  Positive for gait problem and joint swelling. Negative for arthralgias and back pain.   Skin: Negative.  Negative for color change, pallor, rash and wound.   Allergic/Immunologic: Negative.  Negative for environmental allergies.   Neurological:  Negative for dizziness, weakness and light-headedness.   Hematological: Negative.    Psychiatric/Behavioral:  Negative for agitation, confusion and suicidal ideas. The patient is not nervous/anxious.    All other systems reviewed and are negative    Physical Exam  side: left knee  Vitals and nursing note reviewed. VSS, Afebrile  Constitutional:       Appearance: Normal appearance, awake and alert.  HENT:      Head: Normocephalic and atraumatic.       Pupils: Pupils are equal, round, and reactive to light.   Cardiovascular:      Rate and Rhythm: Normal rate and regular rhythm.   Pulmonary:      Effort: Pulmonary effort is normal.     Abdominal:         Palpations: Abdomen is soft.   Musculoskeletal:   Sensation intact bilaterally, sural/saph/sp/tibal n.  Motor intact flexion/extension/DF/PF/EHL/FHL bilaterally. Palpable symmetric DP/PT pulse bilaterally. Spinal wearing off.    Skin:      Bulky Dressing intact to the surgical extremity. No signs of gross bloody or purulent drainage.     General: Skin is warm and dry.      Capillary Refill: Capillary refill takes less than 2 seconds.   Neurological:       General: No focal deficit present.      Mental Status: She is alert and oriented to person, place, and time. Mental status is at baseline.   Psychiatric:         Mood and Affect: Mood normal.          Home Medications  Scheduled medications  Current Medications[1]     PRN medications  PRN Medications[2]    Discharge medications     Your medication list        START taking these medications        Instructions Last Dose Given Next Dose Due   aspirin 81 mg EC tablet      Take 1 tablet (81 mg) by mouth 2 times a day.       docusate sodium 100 mg capsule  Commonly known as: Colace      Take 1 capsule (100 mg) by mouth 2 times a day.       meloxicam 15 mg tablet  Commonly known as: Mobic      Take 1 tablet (15 mg) by mouth once daily.       oxyCODONE 5 mg immediate release tablet  Commonly known as: Roxicodone      Take 1 tablet (5 mg) by mouth every 6 hours if needed for severe pain (7 - 10) for up to 7 days.       polyethylene glycol 17 gram packet  Commonly known as: Glycolax, Miralax      Take 17 g by mouth once daily. Mix 1 cap (17g) into 8 ounces of fluid.       traMADol 50 mg tablet  Commonly known as: Ultram      Take 1 tablet (50 mg) by mouth every 6 hours if needed for moderate pain (4 - 6) for up to 7 days.              CHANGE how you take these medications        Instructions Last Dose Given Next Dose Due   acetaminophen 500 mg tablet  Commonly known as: Tylenol  What changed: Another medication with the same name was added. Make sure you understand how and when to take each.           acetaminophen 500 mg tablet  Commonly known as: Tylenol Extra Strength  What changed: You were already taking a medication with the same name, and this prescription was added. Make sure you understand how and when to take each.      Take 2 tablets (1,000 mg) by mouth every 6 hours if needed for mild pain (1 - 3).       pantoprazole 40 mg EC tablet  Commonly known as: ProtoNix  What changed: Another medication with the same  "name was added. Make sure you understand how and when to take each.      Take 1 tablet (40 mg) by mouth once daily in the evening.  Take before meals. Do not crush, chew, or split.       pantoprazole 40 mg EC tablet  Commonly known as: ProtoNix  What changed: You were already taking a medication with the same name, and this prescription was added. Make sure you understand how and when to take each.      Take 1 tablet (40 mg) by mouth once daily. Do not crush, chew, or split.              CONTINUE taking these medications        Instructions Last Dose Given Next Dose Due   alcohol swabs  Commonly known as: Alcohol Pads      Use with injections topically       Calcium 600 + D(3) 600 mg-10 mcg (400 unit) tablet  Generic drug: calcium carbonate-vitamin D3           cyanocobalamin 1,000 mcg/mL injection  Commonly known as: Vitamin B-12      Take 1000 Mcg IM daily for 7 days, then take 1000 mcg weekly # 4 weeks, then take 1000 Mcg every month       famotidine 40 mg tablet  Commonly known as: Pepcid      Take 1 tablet (40 mg) by mouth once daily at bedtime.       insulin syringe-needle U-100 31G X 5/16\" 1 mL syringe      Use to inject  vit b12 as directed       ketoconazole 2 % shampoo  Commonly known as: NIZOral      Apply shampoo, lather, leave on 3-5 minutes rinse off       omeprazole 20 mg tablet,delayed release (DR/EC) EC tablet  Commonly known as: PriLOSEC      Take 1 tablet (20 mg) by mouth once daily.       Vitamin D3 125 mcg (5,000 units) tablet  Generic drug: cholecalciferol                  ASK your doctor about these medications        Instructions Last Dose Given Next Dose Due   atorvastatin 20 mg tablet  Commonly known as: Lipitor      Take 1 tablet (20 mg) by mouth once daily.       fluocinolone 0.01 % external oil  Commonly known as: Derma-Smoothe/FS Body Oil      Apply once a day to scalp leave on overnight or at least 4 hours before washing out       meclizine 25 mg tablet  Commonly known as: Antivert   "    Take twice a day , as needed for dizziness                 Where to Get Your Medications        These medications were sent to [facility]  [address], [address], [facility] [address]      Hours: 8AM to 6PM Mon-Fri, 8AM to 4PM Sat, 9AM to 1PM Sun Phone: [phone]   acetaminophen 500 mg tablet  aspirin 81 mg EC tablet  docusate sodium 100 mg capsule  meloxicam 15 mg tablet  oxyCODONE 5 mg immediate release tablet  pantoprazole 40 mg EC tablet  polyethylene glycol 17 gram packet  traMADol 50 mg tablet         You have not been prescribed any medications.       Outpatient Follow-Up  Patient to follow-up with Dr. Gardner  Thank you for trusting us with your care. You should be scheduled for a follow-up post-surgical visit in 2-3 weeks.    Special Instructions  Knee immobilizer when in bed     Please read discharge instructions provided by your surgeon before calling with questions as this will delay care.    Medication refills-Oxycodone and Tramadol will be refilled every 7 days per state law. Request refills through [facility] at the institution in which you had surgery or MyChart. All medication requests may take up to 72 hours to refill and refills after Friday 1pm will be refilled on the next business day.      No future appointments.          [1]   Current Facility-Administered Medications:     acetaminophen (Tylenol) tablet 650 mg, 650 mg, oral, q6h NEO, Miguelito Mccormick MD, 650 mg at 06/10/25 0914    aspirin EC tablet 81 mg, 81 mg, oral, BID, Miguelito Mccormick MD, 81 mg at 06/10/25 0914    benzocaine-menthol (Cepastat Sore Throat) lozenge 1 lozenge, 1 lozenge, Mouth/Throat, q4h PRN, Miguelito Mccormick MD    bisacodyl (Dulcolax) EC tablet 10 mg, 10 mg, oral, Daily PRN, Miguelito Mccormick MD    bisacodyl (Dulcolax) suppository 10 mg, 10 mg, rectal, Daily PRN, Miguelito Mccormick MD    calcium carbonate-vitamin D3 500 mg-5 mcg (200 unit) tablet 1 tablet, 1 tablet, oral,  Daily, Miguelito Mccormick MD, 1 tablet at 06/10/25 0914    cholecalciferol (Vitamin D-3) tablet 125 mcg, 125 mcg, oral, Daily, Miguelito Mccormick MD, 125 mcg at 06/10/25 0914    cyclobenzaprine (Flexeril) tablet 5 mg, 5 mg, oral, TID PRN, Miguelito Mccormick MD    docusate sodium (Colace) capsule 100 mg, 100 mg, oral, BID, Miguelito Mccormick MD    HYDROmorphone (Dilaudid) injection 0.2 mg, 0.2 mg, intravenous, q2h PRN, Miguelito Mccormick MD    ketorolac (Toradol) injection 15 mg, 15 mg, intravenous, q6h, Miguelito Mccormick MD    lactated Ringer's infusion, 50 mL/hr, intravenous, Continuous, Miguelito Mccormick MD, Last Rate: 50 mL/hr at 06/09/25 1830, 50 mL/hr at 06/09/25 1830    meclizine (Antivert) tablet 25 mg, 25 mg, oral, BID PRN, Miguelito Mccormick MD    metoclopramide (Reglan) tablet 10 mg, 10 mg, oral, q6h PRN **OR** metoclopramide (Reglan) injection 10 mg, 10 mg, intravenous, q6h PRN, Miguelito Mccormick MD    naloxone (Narcan) injection 0.2 mg, 0.2 mg, intravenous, q5 min PRN, Miguelito Mccormick MD    ondansetron (Zofran) tablet 4 mg, 4 mg, oral, q8h PRN **OR** ondansetron (Zofran) injection 4 mg, 4 mg, intravenous, q8h PRN, Miguelito Mccormick MD    oxyCODONE (Roxicodone) immediate release tablet 10 mg, 10 mg, oral, q4h PRN, Miguelito Mccormick MD    oxyCODONE (Roxicodone) immediate release tablet 5 mg, 5 mg, oral, q6h PRN, Miguelito Mccormick MD    oxygen (O2) therapy, 2 L/min, inhalation, Continuous, Miguelito Mccormick MD    pantoprazole (ProtoNix) EC tablet 40 mg, 40 mg, oral, Daily PRN, Miguelito Mccormick MD    polyethylene glycol (Glycolax, Miralax) packet 17 g, 17 g, oral, Daily, Miguelito Mccormick MD    scopolamine (Transderm-Scop) patch 1 patch, 1 patch, transdermal, q72h PRN, Miguelito Mccormick MD  [2] PRN medications: benzocaine-menthol, bisacodyl, bisacodyl, cyclobenzaprine, HYDROmorphone, meclizine, metoclopramide **OR** metoclopramide, naloxone, ondansetron **OR** ondansetron,  oxyCODONE, oxyCODONE, pantoprazole, scopolamine

## 2025-06-10 NOTE — HH CARE COORDINATION
Home Care received a Referral for Physical Therapy and Occupational Therapy. We have processed the referral for a Start of Care on 6/11-6/12.     If you have any questions or concerns, please feel free to contact us at [phone]. Follow the prompts, enter your five digit zip code, and you will be directed to your care team on [facility] 1.

## 2025-06-11 ENCOUNTER — HOME CARE VISIT (OUTPATIENT)
Dept: HOME HEALTH SERVICES | Facility: HOME HEALTH | Age: 75
End: 2025-06-11
Payer: COMMERCIAL

## 2025-06-11 VITALS
WEIGHT: 198 LBS | HEART RATE: 105 BPM | BODY MASS INDEX: 31.08 KG/M2 | SYSTOLIC BLOOD PRESSURE: 142 MMHG | TEMPERATURE: 98.5 F | DIASTOLIC BLOOD PRESSURE: 69 MMHG | HEIGHT: 67 IN | RESPIRATION RATE: 16 BRPM

## 2025-06-11 PROCEDURE — 169592 NO-PAY CLAIM PROCEDURE

## 2025-06-11 PROCEDURE — G0151 HHCP-SERV OF PT,EA 15 MIN: HCPCS | Mod: HHH

## 2025-06-11 SDOH — HEALTH STABILITY: PHYSICAL HEALTH: EXERCISE ACTIVITY: QS, SLR, GS, ANKLE ROM

## 2025-06-11 SDOH — HEALTH STABILITY: PHYSICAL HEALTH: PHYSICAL EXERCISE: SIT, STAND, SUPINE

## 2025-06-11 SDOH — HEALTH STABILITY: PHYSICAL HEALTH: PHYSICAL EXERCISE: 15

## 2025-06-11 SDOH — HEALTH STABILITY: PHYSICAL HEALTH: EXERCISE TYPE: TKA

## 2025-06-11 SDOH — HEALTH STABILITY: PHYSICAL HEALTH: EXERCISE ACTIVITIES SETS: 2

## 2025-06-11 ASSESSMENT — ENCOUNTER SYMPTOMS
HIGHEST PAIN SEVERITY IN PAST 24 HOURS: 4/10
SUBJECTIVE PAIN PROGRESSION: GRADUALLY IMPROVING
PAIN LOCATION - PAIN DURATION: MIN
PAIN LOCATION - EXACERBATING FACTORS: MVT
LOWEST PAIN SEVERITY IN PAST 24 HOURS: 1/10
PAIN LOCATION - PAIN QUALITY: ACHE
PAIN LOCATION - RELIEVING FACTORS: CP, MEDS
LIMITED RANGE OF MOTION: 1
PAIN LOCATION: LEFT KNEE
PAIN LOCATION - PAIN FREQUENCY: INTERMITTENT
HYPERTENSION: 1
MUSCLE WEAKNESS: 1
PAIN: 1
PERSON REPORTING PAIN: PATIENT
PAIN LOCATION - PAIN SEVERITY: 4/10
PAIN SEVERITY GOAL: 0/10

## 2025-06-11 ASSESSMENT — ACTIVITIES OF DAILY LIVING (ADL)
CURRENT_FUNCTION: STAND BY ASSIST
AMBULATION_DISTANCE/DURATION_TOLERATED: 5 FT
ENTERING_EXITING_HOME: MODERATE ASSIST
OASIS_M1830: 03
AMBULATION ASSISTANCE ON FLAT SURFACES: 1
PHYSICAL TRANSFERS ASSESSED: 1

## 2025-06-12 LAB
BLOOD EXPIRATION DATE: NORMAL
BLOOD EXPIRATION DATE: NORMAL
DISPENSE STATUS: NORMAL
DISPENSE STATUS: NORMAL
PRODUCT BLOOD TYPE: 6200
PRODUCT BLOOD TYPE: 6200
PRODUCT CODE: NORMAL
PRODUCT CODE: NORMAL
UNIT ABO: NORMAL
UNIT ABO: NORMAL
UNIT NUMBER: NORMAL
UNIT NUMBER: NORMAL
UNIT RH: NORMAL
UNIT RH: NORMAL
UNIT VOLUME: 279
UNIT VOLUME: 325
XM INTEP: NORMAL
XM INTEP: NORMAL

## 2025-06-13 ENCOUNTER — HOME CARE VISIT (OUTPATIENT)
Dept: HOME HEALTH SERVICES | Facility: HOME HEALTH | Age: 75
End: 2025-06-13
Payer: COMMERCIAL

## 2025-06-13 PROCEDURE — G0151 HHCP-SERV OF PT,EA 15 MIN: HCPCS | Mod: HHH

## 2025-06-13 PROCEDURE — G0152 HHCP-SERV OF OT,EA 15 MIN: HCPCS | Mod: HHH

## 2025-06-13 SDOH — HEALTH STABILITY: PHYSICAL HEALTH: EXERCISE TYPE: TKA

## 2025-06-13 SDOH — HEALTH STABILITY: PHYSICAL HEALTH: EXERCISE ACTIVITIES SETS: 3

## 2025-06-13 SDOH — HEALTH STABILITY: PHYSICAL HEALTH: PHYSICAL EXERCISE: SIT, STAND , SUPINE

## 2025-06-13 SDOH — HEALTH STABILITY: PHYSICAL HEALTH: EXERCISE ACTIVITY: TKE, KNEE FLEX, SAQ, SLR, HS

## 2025-06-13 SDOH — HEALTH STABILITY: PHYSICAL HEALTH: PHYSICAL EXERCISE: 15

## 2025-06-13 ASSESSMENT — ACTIVITIES OF DAILY LIVING (ADL)
CURRENT_FUNCTION: INDEPENDENT
AMBULATION ASSISTANCE ON FLAT SURFACES: 1
AMBULATION_DISTANCE/DURATION_TOLERATED: 30 FT
PHYSICAL TRANSFERS ASSESSED: 1

## 2025-06-13 ASSESSMENT — ENCOUNTER SYMPTOMS
PAIN LOCATION: LEFT KNEE
PAIN LOCATION - RELIEVING FACTORS: MEDS, CP
SUBJECTIVE PAIN PROGRESSION: GRADUALLY IMPROVING
PAIN SEVERITY GOAL: 0/10
PAIN LOCATION - PAIN DURATION: MIN
LOWEST PAIN SEVERITY IN PAST 24 HOURS: 1/10
PERSON REPORTING PAIN: PATIENT
PAIN LOCATION - PAIN FREQUENCY: FREQUENT
MUSCLE WEAKNESS: 1
LIMITED RANGE OF MOTION: 1
PAIN: 1
PAIN LOCATION - PAIN QUALITY: ACHE
PAIN LOCATION - PAIN SEVERITY: 4/10
PAIN LOCATION - EXACERBATING FACTORS: ROM
HIGHEST PAIN SEVERITY IN PAST 24 HOURS: 4/10

## 2025-06-14 ASSESSMENT — ENCOUNTER SYMPTOMS
HIGHEST PAIN SEVERITY IN PAST 24 HOURS: 3/10
PERSON REPORTING PAIN: PATIENT
PAIN LOCATION: RIGHT KNEE
PAIN: 1

## 2025-06-14 ASSESSMENT — ACTIVITIES OF DAILY LIVING (ADL)
TOILETING: 1
BATHING EQUIPMENT USED: SPONGE BATHING
DRESSING_UB_CURRENT_FUNCTION: SUPERVISION
DRESSING_LB_CURRENT_FUNCTION: MINIMUM ASSIST
BATHING_CURRENT_FUNCTION: STAND BY ASSIST
BATHING ASSESSED: 1
TOILETING: INDEPENDENT

## 2025-06-16 ENCOUNTER — HOME CARE VISIT (OUTPATIENT)
Dept: HOME HEALTH SERVICES | Facility: HOME HEALTH | Age: 75
End: 2025-06-16
Payer: COMMERCIAL

## 2025-06-16 VITALS — RESPIRATION RATE: 16 BRPM | OXYGEN SATURATION: 95 % | HEART RATE: 98 BPM

## 2025-06-16 PROCEDURE — G0151 HHCP-SERV OF PT,EA 15 MIN: HCPCS | Mod: HHH

## 2025-06-16 SDOH — HEALTH STABILITY: PHYSICAL HEALTH: EXERCISE ACTIVITIES SETS: 3

## 2025-06-16 SDOH — HEALTH STABILITY: PHYSICAL HEALTH: PHYSICAL EXERCISE: 15

## 2025-06-16 SDOH — HEALTH STABILITY: PHYSICAL HEALTH: EXERCISE TYPE: TKA

## 2025-06-16 SDOH — HEALTH STABILITY: PHYSICAL HEALTH: PHYSICAL EXERCISE: SIT, STAND, SUPINE

## 2025-06-16 SDOH — HEALTH STABILITY: PHYSICAL HEALTH: EXERCISE ACTIVITY: OPEN CHAIN EX

## 2025-06-16 ASSESSMENT — ENCOUNTER SYMPTOMS
PAIN LOCATION - PAIN DURATION: MIN
SUBJECTIVE PAIN PROGRESSION: GRADUALLY IMPROVING
LOWEST PAIN SEVERITY IN PAST 24 HOURS: 1/10
PAIN LOCATION: LEFT KNEE
MUSCLE WEAKNESS: 1
PAIN: 1
PERSON REPORTING PAIN: PATIENT
PAIN LOCATION - PAIN QUALITY: ACHE
HIGHEST PAIN SEVERITY IN PAST 24 HOURS: 5/10
PAIN LOCATION - PAIN FREQUENCY: INTERMITTENT
PAIN LOCATION - RELIEVING FACTORS: MEDS
PAIN LOCATION - EXACERBATING FACTORS: ROM
LIMITED RANGE OF MOTION: 1
PAIN LOCATION - PAIN SEVERITY: 5/10
PAIN SEVERITY GOAL: 0/10

## 2025-06-16 ASSESSMENT — ACTIVITIES OF DAILY LIVING (ADL)
PHYSICAL TRANSFERS ASSESSED: 1
AMBULATION_DISTANCE/DURATION_TOLERATED: 50 FT X2
CURRENT_FUNCTION: STAND BY ASSIST
AMBULATION ASSISTANCE ON FLAT SURFACES: 1

## 2025-06-18 ENCOUNTER — HOME CARE VISIT (OUTPATIENT)
Dept: HOME HEALTH SERVICES | Facility: HOME HEALTH | Age: 75
End: 2025-06-18
Payer: COMMERCIAL

## 2025-06-18 VITALS — HEART RATE: 102 BPM | RESPIRATION RATE: 18 BRPM | OXYGEN SATURATION: 95 %

## 2025-06-18 PROCEDURE — G0151 HHCP-SERV OF PT,EA 15 MIN: HCPCS | Mod: HHH

## 2025-06-18 SDOH — HEALTH STABILITY: PHYSICAL HEALTH: EXERCISE ACTIVITIES SETS: 2

## 2025-06-18 SDOH — HEALTH STABILITY: PHYSICAL HEALTH: EXERCISE TYPE: TKA

## 2025-06-18 SDOH — HEALTH STABILITY: PHYSICAL HEALTH: PHYSICAL EXERCISE: SIT, SUPINE

## 2025-06-18 SDOH — HEALTH STABILITY: PHYSICAL HEALTH: EXERCISE ACTIVITY: OPEN CHAIN EX

## 2025-06-18 SDOH — HEALTH STABILITY: PHYSICAL HEALTH: PHYSICAL EXERCISE: 10

## 2025-06-18 ASSESSMENT — ACTIVITIES OF DAILY LIVING (ADL)
CURRENT_FUNCTION: INDEPENDENT
AMBULATION ASSISTANCE ON FLAT SURFACES: 1
PHYSICAL TRANSFERS ASSESSED: 1
AMBULATION_DISTANCE/DURATION_TOLERATED: 50 FT X2

## 2025-06-18 ASSESSMENT — ENCOUNTER SYMPTOMS
PAIN SEVERITY GOAL: 0/10
SUBJECTIVE PAIN PROGRESSION: GRADUALLY IMPROVING
PAIN: 1
PAIN LOCATION - PAIN FREQUENCY: INTERMITTENT
LOWEST PAIN SEVERITY IN PAST 24 HOURS: 1/10
MUSCLE WEAKNESS: 1
LIMITED RANGE OF MOTION: 1
PAIN LOCATION - PAIN QUALITY: ACHE
PERSON REPORTING PAIN: PATIENT
PAIN LOCATION - EXACERBATING FACTORS: ROM
HIGHEST PAIN SEVERITY IN PAST 24 HOURS: 6/10
PAIN LOCATION - PAIN DURATION: MIN
PAIN LOCATION - RELIEVING FACTORS: CP, MEDS
PAIN LOCATION - PAIN SEVERITY: 6/10
PAIN LOCATION: LEFT KNEE

## 2025-06-23 ENCOUNTER — HOME CARE VISIT (OUTPATIENT)
Dept: HOME HEALTH SERVICES | Facility: HOME HEALTH | Age: 75
End: 2025-06-23
Payer: COMMERCIAL

## 2025-06-23 VITALS — RESPIRATION RATE: 16 BRPM

## 2025-06-23 LAB
LAB AP BLOCK FOR ADDITIONAL STUDIES: NORMAL
LABORATORY COMMENT REPORT: NORMAL
PATH REPORT.FINAL DX SPEC: NORMAL
PATH REPORT.GROSS SPEC: NORMAL
PATH REPORT.RELEVANT HX SPEC: NORMAL
PATH REPORT.TOTAL CANCER: NORMAL
PATHOLOGY SYNOPTIC REPORT: NORMAL

## 2025-06-23 PROCEDURE — G0151 HHCP-SERV OF PT,EA 15 MIN: HCPCS | Mod: HHH

## 2025-06-23 SDOH — HEALTH STABILITY: PHYSICAL HEALTH: EXERCISE TYPE: L KNEE

## 2025-06-23 SDOH — HEALTH STABILITY: PHYSICAL HEALTH: EXERCISE ACTIVITIES SETS: 3

## 2025-06-23 SDOH — HEALTH STABILITY: PHYSICAL HEALTH: EXERCISE ACTIVITY: OPEN/ CLOSED CHAIN EX

## 2025-06-23 SDOH — HEALTH STABILITY: PHYSICAL HEALTH: PHYSICAL EXERCISE: 15

## 2025-06-23 ASSESSMENT — ENCOUNTER SYMPTOMS
PAIN: 1
PAIN LOCATION - RELIEVING FACTORS: MEDS, CP
PERSON REPORTING PAIN: PATIENT
LIMITED RANGE OF MOTION: 1
PAIN LOCATION - PAIN QUALITY: ACHE
PAIN LOCATION - PAIN SEVERITY: 5/10
PAIN LOCATION - PAIN DURATION: MIN
MUSCLE WEAKNESS: 1
PAIN SEVERITY GOAL: 0/10
LOWEST PAIN SEVERITY IN PAST 24 HOURS: 2/10
HIGHEST PAIN SEVERITY IN PAST 24 HOURS: 5/10
PAIN LOCATION - EXACERBATING FACTORS: ROM
PAIN LOCATION - PAIN FREQUENCY: INTERMITTENT
SUBJECTIVE PAIN PROGRESSION: GRADUALLY IMPROVING
PAIN LOCATION: LEFT KNEE

## 2025-06-23 ASSESSMENT — ACTIVITIES OF DAILY LIVING (ADL)
AMBULATION_DISTANCE/DURATION_TOLERATED: 50 FT
PHYSICAL TRANSFERS ASSESSED: 1

## 2025-06-25 ENCOUNTER — HOME CARE VISIT (OUTPATIENT)
Dept: HOME HEALTH SERVICES | Facility: HOME HEALTH | Age: 75
End: 2025-06-25
Payer: COMMERCIAL

## 2025-06-25 VITALS — RESPIRATION RATE: 16 BRPM

## 2025-06-25 PROCEDURE — G0151 HHCP-SERV OF PT,EA 15 MIN: HCPCS | Mod: HHH

## 2025-06-25 SDOH — HEALTH STABILITY: PHYSICAL HEALTH: EXERCISE ACTIVITIES SETS: 2

## 2025-06-25 SDOH — HEALTH STABILITY: PHYSICAL HEALTH: PHYSICAL EXERCISE: 15

## 2025-06-25 SDOH — HEALTH STABILITY: PHYSICAL HEALTH: PHYSICAL EXERCISE: SIT, STAND, SUPINE

## 2025-06-25 SDOH — HEALTH STABILITY: PHYSICAL HEALTH: EXERCISE ACTIVITY: OPEN/ CLOSED CHAIN EX

## 2025-06-25 SDOH — HEALTH STABILITY: PHYSICAL HEALTH: EXERCISE TYPE: TKA

## 2025-06-25 ASSESSMENT — ENCOUNTER SYMPTOMS
PAIN: 1
PAIN SEVERITY GOAL: 0/10
PAIN LOCATION - PAIN DURATION: MIN
PAIN LOCATION - EXACERBATING FACTORS: ROM
LIMITED RANGE OF MOTION: 1
MUSCLE WEAKNESS: 1
HIGHEST PAIN SEVERITY IN PAST 24 HOURS: 5/10
PAIN LOCATION - RELIEVING FACTORS: CP, MEDS
PAIN LOCATION - PAIN FREQUENCY: INTERMITTENT
PAIN LOCATION - PAIN QUALITY: ACHE
LOWEST PAIN SEVERITY IN PAST 24 HOURS: 1/10
PAIN LOCATION: LEFT KNEE
PERSON REPORTING PAIN: PATIENT
SUBJECTIVE PAIN PROGRESSION: GRADUALLY IMPROVING
PAIN LOCATION - PAIN SEVERITY: 5/10

## 2025-06-25 ASSESSMENT — ACTIVITIES OF DAILY LIVING (ADL)
AMBULATION ASSISTANCE ON FLAT SURFACES: 1
PHYSICAL TRANSFERS ASSESSED: 1
AMBULATION_DISTANCE/DURATION_TOLERATED: 100 FT

## 2025-07-02 ENCOUNTER — OFFICE VISIT (OUTPATIENT)
Dept: ORTHOPEDIC SURGERY | Facility: CLINIC | Age: 75
End: 2025-07-02
Payer: COMMERCIAL

## 2025-07-02 ENCOUNTER — HOSPITAL ENCOUNTER (OUTPATIENT)
Dept: RADIOLOGY | Facility: CLINIC | Age: 75
Discharge: HOME | End: 2025-07-02
Payer: COMMERCIAL

## 2025-07-02 VITALS — WEIGHT: 198 LBS | BODY MASS INDEX: 31.08 KG/M2 | HEIGHT: 67 IN

## 2025-07-02 DIAGNOSIS — D48.0 BONE, GIANT CELL TUMOR: ICD-10-CM

## 2025-07-02 PROCEDURE — 1160F RVW MEDS BY RX/DR IN RCRD: CPT | Performed by: STUDENT IN AN ORGANIZED HEALTH CARE EDUCATION/TRAINING PROGRAM

## 2025-07-02 PROCEDURE — 1036F TOBACCO NON-USER: CPT | Performed by: STUDENT IN AN ORGANIZED HEALTH CARE EDUCATION/TRAINING PROGRAM

## 2025-07-02 PROCEDURE — 99213 OFFICE O/P EST LOW 20 MIN: CPT | Performed by: STUDENT IN AN ORGANIZED HEALTH CARE EDUCATION/TRAINING PROGRAM

## 2025-07-02 PROCEDURE — 1159F MED LIST DOCD IN RCRD: CPT | Performed by: STUDENT IN AN ORGANIZED HEALTH CARE EDUCATION/TRAINING PROGRAM

## 2025-07-02 PROCEDURE — 73560 X-RAY EXAM OF KNEE 1 OR 2: CPT | Mod: LEFT SIDE | Performed by: RADIOLOGY

## 2025-07-02 PROCEDURE — 73552 X-RAY EXAM OF FEMUR 2/>: CPT | Mod: LEFT SIDE | Performed by: RADIOLOGY

## 2025-07-02 PROCEDURE — 99024 POSTOP FOLLOW-UP VISIT: CPT | Performed by: STUDENT IN AN ORGANIZED HEALTH CARE EDUCATION/TRAINING PROGRAM

## 2025-07-02 PROCEDURE — 73552 X-RAY EXAM OF FEMUR 2/>: CPT | Mod: LT

## 2025-07-02 PROCEDURE — 73560 X-RAY EXAM OF KNEE 1 OR 2: CPT | Mod: LT

## 2025-07-02 PROCEDURE — 1111F DSCHRG MED/CURRENT MED MERGE: CPT | Performed by: STUDENT IN AN ORGANIZED HEALTH CARE EDUCATION/TRAINING PROGRAM

## 2025-07-02 PROCEDURE — 1125F AMNT PAIN NOTED PAIN PRSNT: CPT | Performed by: STUDENT IN AN ORGANIZED HEALTH CARE EDUCATION/TRAINING PROGRAM

## 2025-07-02 ASSESSMENT — ENCOUNTER SYMPTOMS
OCCASIONAL FEELINGS OF UNSTEADINESS: 1
DEPRESSION: 0
LOSS OF SENSATION IN FEET: 0

## 2025-07-02 ASSESSMENT — PATIENT HEALTH QUESTIONNAIRE - PHQ9
1. LITTLE INTEREST OR PLEASURE IN DOING THINGS: NOT AT ALL
2. FEELING DOWN, DEPRESSED OR HOPELESS: NOT AT ALL
SUM OF ALL RESPONSES TO PHQ9 QUESTIONS 1 AND 2: 0

## 2025-07-02 ASSESSMENT — PAIN - FUNCTIONAL ASSESSMENT: PAIN_FUNCTIONAL_ASSESSMENT: 0-10

## 2025-07-02 ASSESSMENT — PAIN SCALES - GENERAL: PAINLEVEL_OUTOF10: 8

## 2025-07-02 NOTE — PROGRESS NOTES
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 female here for postoperative visit for above. Denies interval trauma. Endorsing continued left knee pain and some surrounding numbness particularly medial knee region. Patient has completed home therapy. She is ambulating with a walker. Patient has been compliant with nighttime knee immobilizer and aspirin DVT ppx. Denies any other associated symptoms.  Accompanied by her daughter who provides translation services.    Objective:    Exam:  Gen: alert, appropriately conversational, no apparent distress  Chest: symmetric chest rise, non-labored breathing  Heart: RRR to peripheral palpation    LLE:  -midline knee incision with appropriate interval healing without erythema drainage dehiscence, staples in place  -Some medial knee numbness/paresthesia, otherwise SILT  -No motor deficits  -Palpable pulses distally, toes WWP  -Knee PROM 5-75    Imaging:  XR of the left knee, obtained and personally reviewed today, shows intact distal femoral knee replacement without sign of injury or hardware complication.    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    A/P:  74F with giant cell tumor now s/p left distal femur replacement on 6/9/2025     - Continue aspirin DVT ppx for full 4 weeks  - Okay to discontinue nighttime knee immobilizer and she can be range of motion as tolerated  - Placing referral for outpatient PT for continued work on ambulation as well as range of motion  - Weight Bearing as tolerated  - RTC in 6 weeks for ROM check

## 2025-07-03 ENCOUNTER — HOME CARE VISIT (OUTPATIENT)
Dept: HOME HEALTH SERVICES | Facility: HOME HEALTH | Age: 75
End: 2025-07-03
Payer: COMMERCIAL

## 2025-07-04 ENCOUNTER — HOME CARE VISIT (OUTPATIENT)
Dept: HOME HEALTH SERVICES | Facility: HOME HEALTH | Age: 75
End: 2025-07-04
Payer: COMMERCIAL

## 2025-07-10 ENCOUNTER — HOME CARE VISIT (OUTPATIENT)
Dept: HOME HEALTH SERVICES | Facility: HOME HEALTH | Age: 75
End: 2025-07-10
Payer: COMMERCIAL

## 2025-07-10 SDOH — HEALTH STABILITY: PHYSICAL HEALTH: EXERCISE ACTIVITY: OPEN/ CLOSED CHAIN EX

## 2025-07-10 SDOH — HEALTH STABILITY: PHYSICAL HEALTH: PHYSICAL EXERCISE: SIT, SUPINE, STAND

## 2025-07-10 SDOH — HEALTH STABILITY: PHYSICAL HEALTH: EXERCISE ACTIVITIES SETS: 2

## 2025-07-10 SDOH — HEALTH STABILITY: PHYSICAL HEALTH: EXERCISE TYPE: TKA

## 2025-07-10 SDOH — HEALTH STABILITY: PHYSICAL HEALTH: PHYSICAL EXERCISE: 15

## 2025-07-10 ASSESSMENT — ACTIVITIES OF DAILY LIVING (ADL)
CURRENT_FUNCTION: INDEPENDENT
PHYSICAL TRANSFERS ASSESSED: 1
HOME_HEALTH_OASIS: 01
AMBULATION_DISTANCE/DURATION_TOLERATED: 100 FT
OASIS_M1830: 03
AMBULATION ASSISTANCE ON FLAT SURFACES: 1

## 2025-07-10 ASSESSMENT — ENCOUNTER SYMPTOMS
PAIN: 1
LIMITED RANGE OF MOTION: 1
PAIN LOCATION - PAIN QUALITY: ACHE
PAIN LOCATION - RELIEVING FACTORS: CP, MEDS
PAIN SEVERITY GOAL: 0/10
HIGHEST PAIN SEVERITY IN PAST 24 HOURS: 4/10
PAIN LOCATION - EXACERBATING FACTORS: MVT
PAIN LOCATION - PAIN FREQUENCY: INFREQUENT
SUBJECTIVE PAIN PROGRESSION: GRADUALLY IMPROVING
PAIN LOCATION: LEFT KNEE
LOWEST PAIN SEVERITY IN PAST 24 HOURS: 1/10
MUSCLE WEAKNESS: 1
PAIN LOCATION - PAIN DURATION: MIN
PAIN LOCATION - PAIN SEVERITY: 4/10
PERSON REPORTING PAIN: PATIENT

## 2025-07-11 ENCOUNTER — TELEMEDICINE (OUTPATIENT)
Dept: PRIMARY CARE | Facility: CLINIC | Age: 75
End: 2025-07-11
Payer: COMMERCIAL

## 2025-07-11 DIAGNOSIS — R60.0 LEG EDEMA, LEFT: ICD-10-CM

## 2025-07-11 DIAGNOSIS — Z96.652 S/P TOTAL KNEE ARTHROPLASTY, LEFT: ICD-10-CM

## 2025-07-11 DIAGNOSIS — G89.18 ACUTE POST-OPERATIVE PAIN: ICD-10-CM

## 2025-07-11 DIAGNOSIS — M79.605 ACUTE LEG PAIN, LEFT: Primary | ICD-10-CM

## 2025-07-11 DIAGNOSIS — K21.00 GASTROESOPHAGEAL REFLUX DISEASE WITH ESOPHAGITIS, UNSPECIFIED WHETHER HEMORRHAGE: ICD-10-CM

## 2025-07-11 PROCEDURE — 99214 OFFICE O/P EST MOD 30 MIN: CPT | Performed by: INTERNAL MEDICINE

## 2025-07-11 PROCEDURE — G2211 COMPLEX E/M VISIT ADD ON: HCPCS | Performed by: INTERNAL MEDICINE

## 2025-07-11 PROCEDURE — 1160F RVW MEDS BY RX/DR IN RCRD: CPT | Performed by: INTERNAL MEDICINE

## 2025-07-11 PROCEDURE — 1159F MED LIST DOCD IN RCRD: CPT | Performed by: INTERNAL MEDICINE

## 2025-07-11 PROCEDURE — 1036F TOBACCO NON-USER: CPT | Performed by: INTERNAL MEDICINE

## 2025-07-11 RX ORDER — PANTOPRAZOLE SODIUM 40 MG/1
40 TABLET, DELAYED RELEASE ORAL DAILY
Qty: 30 TABLET | Refills: 0 | Status: SHIPPED | OUTPATIENT
Start: 2025-07-11 | End: 2025-08-10

## 2025-07-11 RX ORDER — DOCUSATE SODIUM 100 MG/1
100 CAPSULE, LIQUID FILLED ORAL 2 TIMES DAILY
Qty: 60 CAPSULE | Refills: 2 | Status: SHIPPED | OUTPATIENT
Start: 2025-07-11 | End: 2025-08-10

## 2025-07-11 RX ORDER — FAMOTIDINE 40 MG/1
40 TABLET, FILM COATED ORAL NIGHTLY
Qty: 30 TABLET | Refills: 3 | Status: SHIPPED | OUTPATIENT
Start: 2025-07-11 | End: 2025-11-08

## 2025-07-11 NOTE — PROGRESS NOTES
Subjective   Patient ID: Blanquita Morales is a 75 y.o. female who is evaluated via telemedicine.  Virtual or Telephone Consent    An interactive audio and video telecommunication system which permits real time communications between the patient (at the originating site) and provider (at the distant site) was utilized to provide this telehealth service.   Verbal consent was requested and obtained from Blanquita Morales on this date, 07/11/25 for a telehealth visit and the patient's location was confirmed at the time of the visit.     Complaining of left leg severe pain, left leg edema.    HPI     This is a 75 years old [place]-speaking lady with medical history of hyperlipidemia, degenerative joint disease, gastroesophageal reflux disease, hearing loss, carpal tunnel syndrome, psoriasis, history of latent TB,  2022, treated with INH, S/p L patholoogical distal femur Fx, S/p 11/16/2024  ORIF distal femur, s/p left total knee arthroplasty 6/9/2025,  Patient is evaluated  via telemedicine.  Has severe leg pain, having difficulty to the leg, difficulty to change her position.  Concerned about cholesterol.  Was not interested to start on medications.  Stated, that scheduled to have surgery for resection left distal femur DR Gardner.     Patient is upset, crying.  Review of Systems    Objective   There were no vitals taken for this visit.    Physical Exam    Assessment/Plan   Problem List Items Addressed This Visit           ICD-10-CM    GERD (gastroesophageal reflux disease) K21.9    Relevant Medications    famotidine (Pepcid) 40 mg tablet    Acute leg pain, left - Primary M79.605    Relevant Orders    Lower extremity venous duplex left     Other Visit Diagnoses         Codes      S/P total knee arthroplasty, left     Z96.652    Relevant Orders    Lower extremity venous duplex left      Leg edema, left     R60.0    Relevant Orders    Lower extremity venous duplex left      Acute post-operative pain     G89.18    Relevant  Medications    pantoprazole (ProtoNix) 40 mg EC tablet    docusate sodium (Colace) 100 mg capsule             L leg pain.  S/p L TKA,  L distal femur replacement  6/9/2025.  Seen by orthopedic surgery.  Started to have symptoms, worsening of pain few days.  Will obtain DVT study for left leg.     S/p L patholoogical distal femur Fx.  S/p 11/16/2024  ORIF distal femur.  Followed by DR Jj Guillen.  Had open Bx L distal femur lesion, curettage as well as plate fixation, in addition to cement 11/2023.  Has Giant cell tumor L femur.   MRI 10/21/2024 was negative.     -Hyperlipidemia.  Keep Mediterranean diet, exercise, weight loss.  Last lipid panel 4/30/2025 total cholesterol 283, LDL is 170.  Advised to take atorvastatin.  Last CT cardiac score 2017, was 2.       Gastroesophageal reflux disease.  Eat small portions.  Avoid Caffeine, spicy foods, chocolate, alcohol.  Do not wear tight clothes.  Keep last meal before bed time > 3 hours.  Keep head side of the bed elevated at all time.   EGD 4/30/2025.     -Degenerative joint disease.  Arthralgia.  Take Tylenol as needed.  Exercise.     -History of latent TB.  Treated with INH.     -Health maintenance.  Medicare wellness annual exam is up to date.  DEXA scan, susan  8/4/2023.  Mammography 11/2024.     - Class 1 Obesity with 31. 01.   Diet, exercise.  Keep ideal BMI less than 25.   9/2025.

## 2025-07-13 ENCOUNTER — APPOINTMENT (OUTPATIENT)
Dept: RADIOLOGY | Facility: HOSPITAL | Age: 75
End: 2025-07-13
Payer: COMMERCIAL

## 2025-07-13 ENCOUNTER — HOSPITAL ENCOUNTER (EMERGENCY)
Facility: HOSPITAL | Age: 75
Discharge: HOME | End: 2025-07-13
Attending: STUDENT IN AN ORGANIZED HEALTH CARE EDUCATION/TRAINING PROGRAM
Payer: COMMERCIAL

## 2025-07-13 VITALS
TEMPERATURE: 98.5 F | WEIGHT: 198 LBS | HEIGHT: 64 IN | HEART RATE: 76 BPM | SYSTOLIC BLOOD PRESSURE: 139 MMHG | BODY MASS INDEX: 33.8 KG/M2 | OXYGEN SATURATION: 96 % | RESPIRATION RATE: 18 BRPM | DIASTOLIC BLOOD PRESSURE: 74 MMHG

## 2025-07-13 DIAGNOSIS — M79.89 LEFT LEG SWELLING: Primary | ICD-10-CM

## 2025-07-13 PROCEDURE — 73630 X-RAY EXAM OF FOOT: CPT | Mod: LT

## 2025-07-13 PROCEDURE — 73630 X-RAY EXAM OF FOOT: CPT | Mod: LEFT SIDE | Performed by: RADIOLOGY

## 2025-07-13 PROCEDURE — 93971 EXTREMITY STUDY: CPT | Performed by: RADIOLOGY

## 2025-07-13 PROCEDURE — 2500000001 HC RX 250 WO HCPCS SELF ADMINISTERED DRUGS (ALT 637 FOR MEDICARE OP): Performed by: STUDENT IN AN ORGANIZED HEALTH CARE EDUCATION/TRAINING PROGRAM

## 2025-07-13 PROCEDURE — 99284 EMERGENCY DEPT VISIT MOD MDM: CPT | Mod: 25 | Performed by: STUDENT IN AN ORGANIZED HEALTH CARE EDUCATION/TRAINING PROGRAM

## 2025-07-13 PROCEDURE — 93971 EXTREMITY STUDY: CPT

## 2025-07-13 RX ORDER — ACETAMINOPHEN 325 MG/1
975 TABLET ORAL ONCE
Status: COMPLETED | OUTPATIENT
Start: 2025-07-13 | End: 2025-07-13

## 2025-07-13 RX ADMIN — ACETAMINOPHEN 650 MG: 325 TABLET ORAL at 23:26

## 2025-07-13 ASSESSMENT — PAIN SCALES - GENERAL
PAINLEVEL_OUTOF10: 0 - NO PAIN
PAINLEVEL_OUTOF10: 8
PAINLEVEL_OUTOF10: 7

## 2025-07-13 ASSESSMENT — PAIN - FUNCTIONAL ASSESSMENT: PAIN_FUNCTIONAL_ASSESSMENT: 0-10

## 2025-07-13 NOTE — ED TRIAGE NOTES
Patient presents to the ED with swelling to left leg/foot x 1 week, getting worse as per family. As per family, patient had a total left knee replacement on 06/9/25 and was doing fine until this week when the swelling started with an increase in pain. Denies CP, SOB.

## 2025-07-14 ENCOUNTER — APPOINTMENT (OUTPATIENT)
Dept: VASCULAR MEDICINE | Facility: CLINIC | Age: 75
End: 2025-07-14
Payer: COMMERCIAL

## 2025-07-14 NOTE — ED PROVIDER NOTES
Emergency Department Provider Note        History of Present Illness     Chief Complaint: left leg/foot swelling   History provided by: Patient  Limitations to History: None  External Chart Review: See ED Course    HPI:  Blanquita Morales is a 75 y.o. female with PMHx of recent left knee arthroplasty presenting to the ED for left foot pain and left leg swelling.  Patient reports that last month she had a left knee arthroplasty.  Over the past week has developed progressively worsening left lower extremity edema and pain in her left foot.  She denies any falls or trauma to the area.  Denies chest pain, shortness of breath, fevers, chills.  Reports her surgical incisions have been healing well and she has not noted any erythema, warmth, or drainage.  No prior history of DVT/PE.    Physical Exam   Triage vitals:  T 36.9 °C (98.5 °F)  HR 74  /66  RR 18  O2 99 %      Constitutional: Awake, alert, not in acute distress  HENT: Head atraumatic, mucous membranes moist  Eyes:  Conjunctivae normal  Neck:  Supple  Lungs: Clear to auscultation, breath sounds equal and symmetric, no wheezes, rales, or rhonchi  Heart: Regular rate and rhythm, no murmur, rub or gallop  Abdomen: Soft, nontender, nondistended, no rebound or guarding  Extremities: Healing surgical incision at right anterior knee without any surrounding erythema, fluctuance, induration.  Asymmetric left lower extremity swelling with 2+ pitting edema to the left extremity.  No bony tenderness to palpation of the knee, tibia, fibula, ankle.  Mild tenderness to palpation of the left midfoot. 5/5 plantar/dorsi flexion, SILT LLE. 2+ DP and PT pulse LLE. Cap refill brisk  Neuro: Alert, no focal deficit  Skin: Warm, dry  Psych: Calm, cooperative     Medical Decision Making & ED Course   Medical Decision Making:  Blanquita Morales is a 75 y.o. female with PMHx as above in HPI who presented to the ED for LLE swelling and pain. Patient was afebrile, hemodynamically stable,  and satting on room air on arrival.     Exam as above. LLE NVI.     ED Course as of 07/14/25 0205   Sun Jul 13, 2025 2009 External chart review:  Telemed visit 7/11/25  Notes L leg pain  Recent L TKA  Ordered DVT US     [SS]   2125 XR foot left 3+ views  I have personally reviewed and interpreted this XR L foot, which shows no fx. Final decision making pending radiology read.   [SS]   2125 XR foot left 3+ views  Radiology read:  IMPRESSION:  No acute osseous abnormality.      Soft tissue swelling of the dorsum of the foot.   [SS]   2314 Lower extremity venous duplex left  I have personally reviewed and interpreted this DVT US LLE, which shows no obvious DVT. Final decision making pending radiology read.   [SS]      ED Course User Index  [SS] Steffen Simerlink, MD         Diagnoses as of 07/14/25 0205   Left leg swelling       XR L foot neg for fx or dislocation. DVT US LLE neg for DVT. She was given a dose of tylenol for pain. Appropriate for DC and PCP follow up. Encouraged ACE wraps or compression stockings, elevation of leg.   ------------------------------------------------  Independent Test Interpretation: See ED Course    Disposition   As a result of the work-up, the patient was discharged home.  she was informed of her diagnosis and instructed to come back with any concerns or worsening of condition.  she and was agreeable to the plan as discussed above.  she was given the opportunity to ask questions.  All of the patient's questions were answered.    Procedures   Procedures    Steffen Simerlink, MD Steffen Simerlink, MD  07/14/25 0206

## 2025-07-15 ENCOUNTER — TELEPHONE (OUTPATIENT)
Dept: PRIMARY CARE | Facility: CLINIC | Age: 75
End: 2025-07-15
Payer: COMMERCIAL

## 2025-07-15 DIAGNOSIS — E78.5 HYPERLIPIDEMIA, UNSPECIFIED HYPERLIPIDEMIA TYPE: ICD-10-CM

## 2025-07-15 DIAGNOSIS — E53.8 VITAMIN B12 DEFICIENCY: ICD-10-CM

## 2025-07-15 DIAGNOSIS — R73.9 HYPERGLYCEMIA: ICD-10-CM

## 2025-07-15 DIAGNOSIS — D64.9 ANEMIA, UNSPECIFIED TYPE: ICD-10-CM

## 2025-07-15 DIAGNOSIS — E55.9 VITAMIN D DEFICIENCY: ICD-10-CM

## 2025-07-15 DIAGNOSIS — R53.83 OTHER FATIGUE: ICD-10-CM

## 2025-07-15 DIAGNOSIS — D50.9 IRON DEFICIENCY ANEMIA, UNSPECIFIED IRON DEFICIENCY ANEMIA TYPE: Primary | ICD-10-CM

## 2025-07-15 DIAGNOSIS — E16.2 HYPOGLYCEMIA: ICD-10-CM

## 2025-07-15 NOTE — TELEPHONE ENCOUNTER
Has continued pain, taking Tylenol for pain, interested to have BW done.  Orders for blood work has been placed.

## 2025-07-24 ENCOUNTER — APPOINTMENT (OUTPATIENT)
Dept: PRIMARY CARE | Facility: CLINIC | Age: 75
End: 2025-07-24
Payer: COMMERCIAL

## 2025-07-24 ENCOUNTER — APPOINTMENT (OUTPATIENT)
Dept: RADIOLOGY | Facility: CLINIC | Age: 75
End: 2025-07-24
Payer: COMMERCIAL

## 2025-07-24 DIAGNOSIS — E55.9 VITAMIN D DEFICIENCY: ICD-10-CM

## 2025-07-24 DIAGNOSIS — E78.5 HYPERLIPIDEMIA, UNSPECIFIED HYPERLIPIDEMIA TYPE: ICD-10-CM

## 2025-07-24 DIAGNOSIS — E53.8 VITAMIN B12 DEFICIENCY: ICD-10-CM

## 2025-07-24 DIAGNOSIS — R73.9 HYPERGLYCEMIA: ICD-10-CM

## 2025-07-24 DIAGNOSIS — E04.9 THYROID ENLARGEMENT: ICD-10-CM

## 2025-07-24 DIAGNOSIS — R53.83 OTHER FATIGUE: ICD-10-CM

## 2025-07-24 DIAGNOSIS — D64.9 ANEMIA, UNSPECIFIED TYPE: ICD-10-CM

## 2025-07-24 LAB
25(OH)D3 SERPL-MCNC: 54 NG/ML (ref 30–100)
ALBUMIN SERPL BCP-MCNC: 3.9 G/DL (ref 3.4–5)
ALP SERPL-CCNC: 138 U/L (ref 45–117)
ALT SERPL W P-5'-P-CCNC: 35 U/L (ref 16–63)
ANION GAP SERPL CALC-SCNC: 14 MMOL/L (ref 10–20)
AST SERPL W P-5'-P-CCNC: 24 U/L (ref 15–37)
BASOPHILS # BLD AUTO: 0.03 X10*3/UL (ref 0.1–1.6)
BASOPHILS NFR BLD AUTO: 0.57 % (ref 0–0.3)
BILIRUB SERPL-MCNC: 0.5 MG/DL (ref 0.2–1)
BUN SERPL-MCNC: 10 MG/DL (ref 7–18)
CALCIUM SERPL-MCNC: 9.6 MG/DL (ref 8.5–10.1)
CHLORIDE SERPL-SCNC: 101 MMOL/L (ref 98–107)
CHOLEST SERPL-MCNC: 279 MG/DL (ref 0–199)
CHOLESTEROL/HDL RATIO: 4.2 (ref 4.2–7)
CO2 SERPL-SCNC: 24 MMOL/L (ref 21–32)
CREAT SERPL-MCNC: 0.51 MG/DL (ref 0.6–1.1)
EGFRCR SERPLBLD CKD-EPI 2021: >90 ML/MIN/1.73M*2
EOSINOPHIL # BLD AUTO: 0.32 X10*3/UL (ref 0.04–0.5)
EOSINOPHIL NFR BLD AUTO: 6.24 % (ref 0.7–7)
ERYTHROCYTE [DISTWIDTH] IN BLOOD BY AUTOMATED COUNT: 16.6 % (ref 11.5–14.5)
GLUCOSE SERPL-MCNC: 91 MG/DL (ref 74–100)
HBA1C MFR BLD: 5 %
HCT VFR BLD AUTO: 35.4 % (ref 36.6–46.6)
HDLC SERPL-MCNC: 66 MG/DL (ref 40–59)
HGB BLD-MCNC: 11.14 G/DL (ref 12–15.4)
IS PATIENT FASTING: YES
LDLC SERPL DIRECT ASSAY-MCNC: 168 MG/DL (ref 0–100)
LYMPHOCYTES # BLD AUTO: 1.42 X10*3/UL (ref 0–6)
LYMPHOCYTES NFR BLD AUTO: 27.43 % (ref 20.5–51.1)
MCH RBC QN AUTO: 26.7 PG (ref 26–32)
MCHC RBC AUTO-ENTMCNC: 31.5 G/DL (ref 31–38)
MCV RBC AUTO: 84.9 FL (ref 80–96)
MONOCYTES # BLD AUTO: 0.59 X10*3/UL (ref 1.6–24.9)
MONOCYTES NFR BLD AUTO: 11.36 % (ref 1.7–9.3)
NEUTROPHILS # BLD AUTO: 2.82 X10*3/UL (ref 1.4–6.5)
NEUTROPHILS NFR BLD AUTO: 54.4 % (ref 42.2–75.2)
PLATELET # BLD AUTO: 293.7 X10*3/UL (ref 150–450)
PMV BLD AUTO: 9.52 FL (ref 7.8–11)
POTASSIUM SERPL-SCNC: 4.2 MMOL/L (ref 3.5–5.1)
PROT SERPL-MCNC: 6.8 G/DL (ref 6.4–8.2)
RBC # BLD AUTO: 4.17 X10*6/UL (ref 3.9–5.3)
SODIUM SERPL-SCNC: 135 MMOL/L (ref 136–145)
TRIGL SERPL-MCNC: 112 MG/DL
TSH SERPL-ACNC: 1.57 MIU/L (ref 0.44–3.98)
VIT B12 SERPL-MCNC: 1876 PG/ML (ref 193–986)
WBC # BLD AUTO: 5.19 X10*3/UL (ref 4.5–10.5)

## 2025-07-24 PROCEDURE — 84443 ASSAY THYROID STIM HORMONE: CPT | Performed by: INTERNAL MEDICINE

## 2025-07-24 PROCEDURE — 76536 US EXAM OF HEAD AND NECK: CPT

## 2025-07-24 PROCEDURE — 83036 HEMOGLOBIN GLYCOSYLATED A1C: CPT | Performed by: INTERNAL MEDICINE

## 2025-07-24 PROCEDURE — 82607 VITAMIN B-12: CPT | Performed by: INTERNAL MEDICINE

## 2025-07-24 PROCEDURE — 76536 US EXAM OF HEAD AND NECK: CPT | Performed by: RADIOLOGY

## 2025-07-24 PROCEDURE — 82306 VITAMIN D 25 HYDROXY: CPT | Performed by: INTERNAL MEDICINE

## 2025-07-24 PROCEDURE — 80053 COMPREHEN METABOLIC PANEL: CPT | Performed by: INTERNAL MEDICINE

## 2025-08-05 ENCOUNTER — EVALUATION (OUTPATIENT)
Dept: PHYSICAL THERAPY | Facility: CLINIC | Age: 75
End: 2025-08-05
Payer: COMMERCIAL

## 2025-08-05 DIAGNOSIS — M79.605 ACUTE LEG PAIN, LEFT: Primary | ICD-10-CM

## 2025-08-05 DIAGNOSIS — M62.81 MUSCLE WEAKNESS ON EXAMINATION: ICD-10-CM

## 2025-08-05 DIAGNOSIS — D48.0 GIANT CELL TUMOR OF BONE: ICD-10-CM

## 2025-08-05 DIAGNOSIS — R26.2 DIFFICULTY WALKING: ICD-10-CM

## 2025-08-05 PROCEDURE — 97161 PT EVAL LOW COMPLEX 20 MIN: CPT | Mod: GP

## 2025-08-05 PROCEDURE — 97535 SELF CARE MNGMENT TRAINING: CPT | Mod: GP

## 2025-08-05 NOTE — PROGRESS NOTES
Physical Therapy  Physical Therapy Orthopedic Evaluation    Patient Name: Blanquita Morales  MRN: [id number]  Today's Date: 8/5/2025    Insurance:  Visit number: 1 of 12 ( Auth after 12 visits)  Authorization info: Yes  Insurance Type: Payor: IDRIS NICHOLS Count includes the [facility] / Plan: [employer] [place] / Product Type: *No Product type* /    Current Problem  Problem List Items Addressed This Visit           ICD-10-CM    Acute leg pain, left - Primary M79.605    Relevant Orders    Follow Up In Physical Therapy    Muscle weakness on examination M62.81    Relevant Orders    Follow Up In Physical Therapy    Difficulty walking R26.2    Relevant Orders    Follow Up In Physical Therapy    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 Keith Gardner  General:   Left knee/leg pain  S/p resection left distal femur, left distal femoral replacement   Precautions:   WBAT  Medical History Form: Reviewed (scanned into chart)  L leg pain.   S/p L patholoogical distal femur Fx.  S/p 11/16/2023 ORIF distal femur.  Had open Bx L distal femur lesion, curettage as well as plate fixation, in addition to cement 11/2023.    Subjective:   Subjective   Chief Complaint: Pt is a 74 yo female who has been treated in our clinic before for her left knee pain associated with giant bone cell tumor removal and OA.  She most recently underwent left femur hardware removal, wide resection left distal femur, and left distal femoral replacement on 6/9/25. She was given a knee immobilizer to wear in bed and able to be WBAT.  Pt was seen by home care and dc.  She had some swelling in the left leg and an US was given and normal per reports.      Pt is using tylenol for pain management.  She reports medial joint line that is point of discomfort.  She reports still has numbness in the medial and lateral lower leg.      Pt is using a wheeled walker in the home right now and occasionally a SPC.  She reports the most  comfort with a wheeled walker.  Prior to the surgery, she used a SPC in the home.      Denies any current falls.           Current Condition:   Better      Rehab Fall Risk: Low: Recent invasive procedure or hospitalization    This patient is identified as a low fall risk based on the highest level factors present.    Outpatient Rehab Fall Risk Interventions:  Low Fall Risk Interventions: Low Fall Risk Interventions: Fall prevention education provided and Optimize clinic environment ensuring safe and accessible pathways    Pain:     Current: 3 At Worst: 10  Location: anterior thigh/knee, most sharp pain is medial joint line  Description: achiness and constant, intermittent drilling pain in the medial knee   Aggravating Factors: varies, WB, loaded activity, but pain will happen at rest  Relieving Factors:  Rest, medication     Relevant Information (PMH & Previous Tests/Imaging): See chart  Previous Interventions/Treatments: Physical Therapy, home care    Prior Level of Function (PLOF)  Patient previously independent with all ADLs  4 steps in the home, double hand rail  Exercise/Physical Activity: enjoys being with her grandchildren, lives with her daughter  Work/School: Retired    Patients Living Environment: Reviewed and no concern  Primary Language: English spoken, written Citizen of [place]  Patient's Goal(s) for Therapy: improve LE functioning, decrease pain    Red Flags: Do you have any of the following? No  Fever/chills, unexplained weight changes, dizziness/fainting, unexplained change in bowel or bladder functions, unexplained malaise or muscle weakness, night pain/sweats, numbness or tingling    Objective         Posture: rounded shoulders  + genu valgum of the LE, better than it was prior to surgery, less ER rotation of the left foot but still more than the right       LE mobility:   Hip right and left hip WFL for flexion, ext neutral, IR and ER not tested today    Knee:  Right knee flexion WFL for flexion, ext 0: 5  degrees       Left knee flexion seated passive 100 degrees, supine uncomfortable, decrease in tolerance of this stretch after the extension stretch 0:40 degrees   Knee extension 0:15 degrees     Hip flex 4-/5 right left 3+/5   Ext NOT  Abd right 4-/5 L 3+/5   Add right 4-/5 B  KE right 4+/5 left 3/5  KF right 4+/5 left 4-/5    Palpation:  TTP over the medial joint line of the left knee, moderate reactivity to soft touch  Incision site is well healed    No signs of any redness, mild warmth    Mild swelling of the left LE around the calf and the ankle        Lower Extremity Functional Movements  Transfers: + use of the hands for sit to stand, decrease in weight shift over the left LE  Gait: ambulation with wheeled walker, decrease in stance time on the left, decrease in heel strike, decrease in active swing  SL Balance: unable to perform at this time  Stair climbing:  non-reciprocal leading, with 2 UE A      TU.36 secs      Outcome Measures:  Other Measures  Lower Extremity Funtional Score (LEFS):      Treatments:  Heel slides  Seated and supine  Heel prop  Quad set into towel roll  Safety in the home     EDUCATION: Home exercise program, plan of care, activity modifications, pain management, and injury pathology  Outpatient Education  Individual(s) Educated: Patient, Other  Education Provided: Anatomy, Home Exercise Program, Home Safety, POC, Posture, Other  Risk and Benefits Discussed with Patient/Caregiver/Other: yes  Patient/Caregiver Demonstrated Understanding: yes  Plan of Care Discussed and Agreed Upon: yes  Patient Response to Education: Patient/Caregiver Verbalized Understanding of Information    Assessment: Patient presents with signs and symptoms consistent with left knee pain s/p left femur hardware removal, wide resection left distal femur, and left distal femoral replacement on 25, resulting in limited participation in pain-free ADLs and inability to perform at their prior level of  function. Pt would benefit from physical therapy to address the impairments found & listed previously in the objective section in order to return to safe and pain-free ADLs and prior level of function.  Stable and/or uncomplicated characteristics    PT Assessment Results: Decreased strength, Decreased range of motion, Decreased endurance, Impaired balance, Decreased mobility, Impaired sensation, Pain  Rehab Prognosis: Good    Plan:  PT Plan: Skilled PT  Certification Period Start Date: 08/05/25  Certification Period End Date: 10/04/25  Rehab Potential: Good  Plan of Care Agreement: Patient, Other (comment)  Planned Interventions include: therapeutic exercise, self-care home management, manual therapy, therapeutic activities, gait training, neuromuscular coordination, vasopneumatic, dry needling, aquatic therapy  Frequency: 1-2 x Week  Duration: 8 Weeks  Goals: Set and discussed today      Short Term goal all within 4 weeks      Pt will improve passive knee extension to 0:5 degrees at least to improve active extension needed for ambulation  Pt will improve functional strength to perform 6 in step up x 10 reps with left LE.  Pt will improve functional mobility of the left knee to at least > 110 degrees of flexion.   Pt will require no verbal cues to assist with active knee flexion during swing phase of ambulation     Long Term goals all within 8 weeks  1.  Patient will report < 3/10 in left knee pain with light physical activity.   2.  Patient will demonstrate  4+ /5 in left LE musculature.   3.  Patient will be able to stand on LE  in SLS with minimal UE assistance for >20 secs to improve dynamic stability necessary for LE dressing.   4.   Patient will be able to successfully ascent/descend 1 flight of stairs reciprocally with the use of 1 hand rail.   5.   Patient will report > 5 on GROC scale.   6.  Patient will be independent with HEP.        Plan of care was developed with input and agreement by the  patient  Ambulatory Screenings Summary       Screening  Frequency  Date Last Completed   Spiritual and Cultural Beliefs   Screening  each visit or episode of care 6/9/2025   Falls Risk Screening  every ambulatory visit    Pain Screening  annually at primary care visit  1/22/2025   Domestic Violence screening  annually at primary care visit 10/22/2024   Elder Abuse Screening  annually at primary care visit 10/22/2024   Depression Screening  annually in the primary care setting 7/2/2025   Suicide Risk Screening  annually in the primary care setting 7/13/2025   Nutrition and Food Insecurity   Screening  at least annually at primary care visit  6/9/2025   Key Learner  annually in the primary care setting 6/9/2025   Drug Screen  6/9/2025  6:40 PM   Alcohol Screen  6/9/2025  6:40 PM   Advance Directive  10/22/2024       Time Calculation  Start Time: 1230  Stop Time: 1315  Time Calculation (min): 45 min  PT Evaluation Time Entry  PT Evaluation (Low) Time Entry: 25 PT Therapeutic Procedures Time Entry  Self-Care/Home Mgmt Training: 15

## 2025-08-05 NOTE — LETTER
August 5, 2025    Ifrah Valladares, PT  [address] [address]    Patient: Blanquita Morales   YOB: 1950   Date of Visit: 8/5/2025       Dear Wilmer Gardner MD  [id number]  [place] [address]    The attached plan of care is being sent to you because your patient’s medical reimbursement requires that you certify the plan of care. Your signature is required to allow uninterrupted insurance coverage.      You may indicate your approval by signing below and faxing this form back to us at Dept Fax: [phone].    Please call Dept: [phone] with any questions or concerns.    Thank you for this referral,        Ifrah Valladares, PT  [address] [address]    Payer: Payor: [employer]DRIS NICHOLS [facility] / Plan: [facility] [employer] [place] / Product Type: *No Product type* /                                                                         Date:     Dear Ifrah Valladares, PT,     Re: Ms. Blanquita Morales, MRN:[id number]    I certify that I have reviewed the attached plan of care and it is medically necessary for Ms. Blanquita Morales (1950) who is under my care.          ______________________________________                    _________________  Provider name and credentials                                           Date and time                                                                                           Plan of Care 8/5/25   Effective from: 8/5/2025  Effective to: 10/4/2025    Plan ID: [id number]            Participants as of Finalize on 8/5/2025    Name Type Comments Contact Info    Wilmer Gardner MD Consulting Physician  [phone]    Ifrah Valladares, PT Physical Therapist  [phone]       Last Plan Note     Author: Ifrah Valladares PT Status: Incomplete Last edited: 8/5/2025 12:30 PM       Physical Therapy  Physical Therapy Orthopedic  Evaluation    Patient Name: Blanquita Morales  MRN: [id number]  Today's Date: 8/5/2025    Insurance:  Visit number: 1 of 12 ( Auth after 12 visits)  Authorization info: Yes  Insurance Type: Payor: IDRIS NICHOLS [facility] / Plan: [employer] [place] / Product Type: *No Product type* /    Current Problem  Problem List Items Addressed This Visit           ICD-10-CM    Acute leg pain, left - Primary M79.605    Relevant Orders    Follow Up In Physical Therapy    Muscle weakness on examination M62.81    Relevant Orders    Follow Up In Physical Therapy    Difficulty walking R26.2    Relevant Orders    Follow Up In Physical Therapy    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 Keith Gardner  General:   Left knee/leg pain  S/p resection left distal femur, left distal femoral replacement   Precautions:   WBAT  Medical History Form: Reviewed (scanned into chart)  L leg pain.   S/p L patholoogical distal femur Fx.  S/p 11/16/2023 ORIF distal femur.  Had open Bx L distal femur lesion, curettage as well as plate fixation, in addition to cement 11/2023.    Subjective:   Subjective  Chief Complaint: Pt is a 74 yo female who has been treated in our clinic before for her left knee pain associated with giant bone cell tumor removal and OA.  She most recently underwent left femur hardware removal, wide resection left distal femur, and left distal femoral replacement on 6/9/25. She was given a knee immobilizer to wear in bed and able to be WBAT.  Pt was seen by home care and dc.  She had some swelling in the left leg and an US was given and normal per reports.      Pt is using tylenol for pain management.  She reports medial joint line that is point of discomfort.  She reports still has numbness in the medial and lateral lower leg.      Pt is using a wheeled walker in the home right now and occasionally a SPC.  She reports the most comfort with a wheeled walker.  Prior to the  surgery, she used a SPC in the home.      Denies any current falls.           Current Condition:   Better      Rehab Fall Risk: Low: Recent invasive procedure or hospitalization    This patient is identified as a low fall risk based on the highest level factors present.    Outpatient Rehab Fall Risk Interventions:  Low Fall Risk Interventions: Low Fall Risk Interventions: Fall prevention education provided and Optimize clinic environment ensuring safe and accessible pathways    Pain:     Current: 3 At Worst: 10  Location: anterior thigh/knee, most sharp pain is medial joint line  Description: achiness and constant, intermittent drilling pain in the medial knee   Aggravating Factors: varies, WB, loaded activity, but pain will happen at rest  Relieving Factors:  Rest, medication     Relevant Information (PMH & Previous Tests/Imaging): See chart  Previous Interventions/Treatments: Physical Therapy, home care    Prior Level of Function (PLOF)  Patient previously independent with all ADLs  4 steps in the home, double hand rail  Exercise/Physical Activity: enjoys being with her grandchildren, lives with her daughter  Work/School: Retired    Patients Living Environment: Reviewed and no concern  Primary Language: English spoken, written Estonian  Patient's Goal(s) for Therapy: improve LE functioning, decrease pain    Red Flags: Do you have any of the following? No  Fever/chills, unexplained weight changes, dizziness/fainting, unexplained change in bowel or bladder functions, unexplained malaise or muscle weakness, night pain/sweats, numbness or tingling    Objective        Posture: rounded shoulders  + genu valgum of the LE, better than it was prior to surgery, less ER rotation of the left foot but still more than the right       LE mobility:   Hip right and left hip WFL for flexion, ext neutral, IR and ER not tested today    Knee:  Right knee flexion WFL for flexion, ext 0: 5 degrees       Left knee flexion seated passive  100 degrees, supine uncomfortable, decrease in tolerance of this stretch after the extension stretch 0:40 degrees   Knee extension 0:15 degrees     Hip flex 4-/5 right left 3+/5   Ext NOT  Abd right 4-/5 L 3+/5   Add right 4-/5 B  KE right 4+/5 left 3/5  KF right 4+/5 left 4-/5    Palpation:  TTP over the medial joint line of the left knee, moderate reactivity to soft touch  Incision site is well healed    No signs of any redness, mild warmth    Mild swelling of the left LE around the calf and the ankle        Lower Extremity Functional Movements  Transfers: + use of the hands for sit to stand, decrease in weight shift over the left LE  Gait: ambulation with wheeled walker, decrease in stance time on the left, decrease in heel strike, decrease in active swing  SL Balance: unable to perform at this time  Stair climbing:  non-reciprocal leading, with 2 UE A      TU.36 secs      Outcome Measures:  Other Measures  Lower Extremity Funtional Score (LEFS):      Treatments:  Heel slides  Seated and supine  Heel prop  Quad set into towel roll  Safety in the home     EDUCATION: Home exercise program, plan of care, activity modifications, pain management, and injury pathology  Outpatient Education  Individual(s) Educated: Patient, Other  Education Provided: Anatomy, Home Exercise Program, Home Safety, POC, Posture, Other  Risk and Benefits Discussed with Patient/Caregiver/Other: yes  Patient/Caregiver Demonstrated Understanding: yes  Plan of Care Discussed and Agreed Upon: yes  Patient Response to Education: Patient/Caregiver Verbalized Understanding of Information    Assessment: Patient presents with signs and symptoms consistent with left knee pain s/p left femur hardware removal, wide resection left distal femur, and left distal femoral replacement on 25, resulting in limited participation in pain-free ADLs and inability to perform at their prior level of function. Pt would benefit from physical therapy to  address the impairments found & listed previously in the objective section in order to return to safe and pain-free ADLs and prior level of function.  Stable and/or uncomplicated characteristics    PT Assessment Results: Decreased strength, Decreased range of motion, Decreased endurance, Impaired balance, Decreased mobility, Impaired sensation, Pain  Rehab Prognosis: Good    Plan:  PT Plan: Skilled PT  Certification Period Start Date: 08/05/25  Certification Period End Date: 10/04/25  Rehab Potential: Good  Plan of Care Agreement: Patient, Other (comment)  Planned Interventions include: therapeutic exercise, self-care home management, manual therapy, therapeutic activities, gait training, neuromuscular coordination, vasopneumatic, dry needling, aquatic therapy  Frequency: 1-2 x Week  Duration: 8 Weeks  Goals: Set and discussed today      Short Term goal   Pt will improve passive knee extension to 0:5 degrees at least to improve active extension needed for ambulation  Pt will improve functional strength to perform 6 in step up x 10 reps with left LE.  Pt will improve functional mobility of the left knee to at least > 110 degrees o  1.  Patient will report < 3/10 in left knee pain with light physical activity.   2.  Patient will demonstrate  4+ /5 in left LE musculature.   3.  Patient will be able to stand on LE  in SLS with minimal UE assistance for >20 secs to improve dynamic stability necessary for LE dressing.   4.   Patient will be able to successfully ascent/descend 1 flight of stairs reciprocally with the use of 1 hand rail.   5.   Patient will report > 5 on GROC scale.   6.  Patient will be independent with HEP.        Plan of care was developed with input and agreement by the patient  Ambulatory Screenings Summary       Screening  Frequency  Date Last Completed   Spiritual and Cultural Beliefs   Screening  each visit or episode of care 6/9/2025   Falls Risk Screening  every ambulatory visit    Pain Screening   annually at primary care visit  1/22/2025   Domestic Violence screening  annually at primary care visit 10/22/2024   Elder Abuse Screening  annually at primary care visit 10/22/2024   Depression Screening  annually in the primary care setting 7/2/2025   Suicide Risk Screening  annually in the primary care setting 7/13/2025   Nutrition and Food Insecurity   Screening  at least annually at primary care visit  6/9/2025   Key Learner  annually in the primary care setting 6/9/2025   Drug Screen  6/9/2025  6:40 PM   Alcohol Screen  6/9/2025  6:40 PM   Advance Directive  10/22/2024                            Current Participants as of 8/5/2025    Name Type Comments Contact Info    Wilmer Gardner MD Consulting Physician  [phone]    Signature pending    Ifrah Valladares, KATRIN Physical Therapist  [phone]    Electronically signed by Ifrah Valladares, PT at 8/5/2025 1298 EDT

## 2025-08-07 ENCOUNTER — TREATMENT (OUTPATIENT)
Dept: PHYSICAL THERAPY | Facility: CLINIC | Age: 75
End: 2025-08-07
Payer: COMMERCIAL

## 2025-08-07 DIAGNOSIS — M79.605 ACUTE LEG PAIN, LEFT: ICD-10-CM

## 2025-08-07 DIAGNOSIS — D48.0 GIANT CELL TUMOR OF BONE: ICD-10-CM

## 2025-08-07 DIAGNOSIS — M62.81 MUSCLE WEAKNESS ON EXAMINATION: ICD-10-CM

## 2025-08-07 DIAGNOSIS — R26.2 DIFFICULTY WALKING: ICD-10-CM

## 2025-08-07 PROCEDURE — 97110 THERAPEUTIC EXERCISES: CPT | Mod: GP

## 2025-08-07 PROCEDURE — 97140 MANUAL THERAPY 1/> REGIONS: CPT | Mod: GP

## 2025-08-07 NOTE — PROGRESS NOTES
Physical Therapy  Physical Therapy Treatment Note    Patient Name: Blanquita Morales  MRN: [id number]  Today's Date: 8/7/2025  Time Calculation  Start Time: 0800  Stop Time: 0845  Time Calculation (min): 45 min  PT Therapeutic Procedures Time Entry  Therapeutic Exercise Time Entry: 15  Manual Therapy Time Entry: 23        Insurance:  Visit number: 2 of 12  Authorization info: after 12 visits  Insurance Type: Payor: [employer] / Plan: [employer] [place] / Product Type: *No Product type* /   Current Problem  1. Muscle weakness on examination  Follow Up In Physical Therapy      2. Difficulty walking  Follow Up In Physical Therapy      3. Acute leg pain, left  Follow Up In Physical Therapy      4. Giant cell tumor of bone  Follow Up In Physical Therapy      Referred by: Dr. Keith Gardner  General:   Left knee/leg pain  S/p resection left distal femur, left distal femoral replacement   Precautions:   WBAT  Medical History Form: Reviewed (scanned into chart)  L leg pain.   S/p L patholoogical distal femur Fx.  S/p 11/16/2023 ORIF distal femur.  Had open Bx L distal femur lesion, curettage as well as plate fixation, in addition to cement 11/2023.  Subjective:   Subjective   Patient reports that she is feeling ok.  Still feels the most discomfort in the medial joint line  Pain     Objective:   Objective   -supine knee flexion 85 degrees .     Treatments:   -Bike recumbent seat 10, rocking, slowly backwards  - seated on the edge of the mat, retrograde massage, multiple angles, mobilization with movement for flexion ( hands on 15 min  - seated A/AROM knee flexion/ext  - seated active knee ext x 10 reps  -supine patellar mob ( all directions) retrograde massage with small bolster under the knee hands on for 8 min  - quad set hold x 12 reps  - SLR with assistance  ( 15% assisted) x 10 reps  - SAQ x 12 reps  - glute set holds x 6 secs x 12 reps  -counter top weight shift  - review of HEP and guidelines    TE x 1    Manual x 2    THERE EX    MANUAL    NMRE    THERE ACT    Assessment:  Pt had mild to reactivity with the soft tissue massage.  She was able to demonstrate improvement in functional strength with the SLR with mild assistance.  Still challenged with the LAQ and terminal knee extension.  Pt will continue to benefit from skilled care to work on the impairments list.      Plan: continue to focus on left knee mobility, functional strength, and gait mechanics     Goals:    Short Term goal all within 4 weeks       Pt will improve passive knee extension to 0:5 degrees at least to improve active extension needed for ambulation  Pt will improve functional strength to perform 6 in step up x 10 reps with left LE.  Pt will improve functional mobility of the left knee to at least > 110 degrees of flexion.   Pt will require no verbal cues to assist with active knee flexion during swing phase of ambulation      Long Term goals all within 8 weeks  1.  Patient will report < 3/10 in left knee pain with light physical activity.   2.  Patient will demonstrate  4+ /5 in left LE musculature.   3.  Patient will be able to stand on LE  in SLS with minimal UE assistance for >20 secs to improve dynamic stability necessary for LE dressing.   4.   Patient will be able to successfully ascent/descend 1 flight of stairs reciprocally with the use of 1 hand rail.   5.   Patient will report > 5 on GROC scale.   6.  Patient will be independent with HEP.

## 2025-08-12 ENCOUNTER — TREATMENT (OUTPATIENT)
Dept: PHYSICAL THERAPY | Facility: CLINIC | Age: 75
End: 2025-08-12
Payer: COMMERCIAL

## 2025-08-12 DIAGNOSIS — R26.2 DIFFICULTY WALKING: ICD-10-CM

## 2025-08-12 DIAGNOSIS — M79.605 ACUTE LEG PAIN, LEFT: ICD-10-CM

## 2025-08-12 DIAGNOSIS — D48.0 GIANT CELL TUMOR OF BONE: ICD-10-CM

## 2025-08-12 DIAGNOSIS — M62.81 MUSCLE WEAKNESS ON EXAMINATION: ICD-10-CM

## 2025-08-12 PROCEDURE — 97140 MANUAL THERAPY 1/> REGIONS: CPT | Mod: GP

## 2025-08-12 PROCEDURE — 97110 THERAPEUTIC EXERCISES: CPT | Mod: GP

## 2025-08-13 ENCOUNTER — HOSPITAL ENCOUNTER (OUTPATIENT)
Dept: RADIOLOGY | Facility: CLINIC | Age: 75
Discharge: HOME | End: 2025-08-13
Payer: COMMERCIAL

## 2025-08-13 ENCOUNTER — OFFICE VISIT (OUTPATIENT)
Dept: ORTHOPEDIC SURGERY | Facility: CLINIC | Age: 75
End: 2025-08-13
Payer: COMMERCIAL

## 2025-08-13 VITALS — HEIGHT: 64 IN | WEIGHT: 198 LBS | BODY MASS INDEX: 33.8 KG/M2

## 2025-08-13 DIAGNOSIS — D48.0 BONE, GIANT CELL TUMOR: ICD-10-CM

## 2025-08-13 DIAGNOSIS — D48.0 GIANT CELL TUMOR OF BONE: ICD-10-CM

## 2025-08-13 PROCEDURE — 99024 POSTOP FOLLOW-UP VISIT: CPT | Performed by: STUDENT IN AN ORGANIZED HEALTH CARE EDUCATION/TRAINING PROGRAM

## 2025-08-13 PROCEDURE — 1036F TOBACCO NON-USER: CPT | Performed by: STUDENT IN AN ORGANIZED HEALTH CARE EDUCATION/TRAINING PROGRAM

## 2025-08-13 PROCEDURE — 73560 X-RAY EXAM OF KNEE 1 OR 2: CPT | Mod: LEFT SIDE | Performed by: RADIOLOGY

## 2025-08-13 PROCEDURE — 1159F MED LIST DOCD IN RCRD: CPT | Performed by: STUDENT IN AN ORGANIZED HEALTH CARE EDUCATION/TRAINING PROGRAM

## 2025-08-13 PROCEDURE — 73560 X-RAY EXAM OF KNEE 1 OR 2: CPT | Mod: LT

## 2025-08-13 PROCEDURE — 99212 OFFICE O/P EST SF 10 MIN: CPT | Performed by: STUDENT IN AN ORGANIZED HEALTH CARE EDUCATION/TRAINING PROGRAM

## 2025-08-13 PROCEDURE — 1160F RVW MEDS BY RX/DR IN RCRD: CPT | Performed by: STUDENT IN AN ORGANIZED HEALTH CARE EDUCATION/TRAINING PROGRAM

## 2025-08-13 ASSESSMENT — ENCOUNTER SYMPTOMS
LOSS OF SENSATION IN FEET: 0
DEPRESSION: 0
OCCASIONAL FEELINGS OF UNSTEADINESS: 1

## 2025-08-13 ASSESSMENT — PAIN - FUNCTIONAL ASSESSMENT: PAIN_FUNCTIONAL_ASSESSMENT: 0-10

## 2025-08-13 ASSESSMENT — PAIN SCALES - GENERAL: PAINLEVEL_OUTOF10: 5 - MODERATE PAIN

## 2025-08-14 ENCOUNTER — TREATMENT (OUTPATIENT)
Dept: PHYSICAL THERAPY | Facility: CLINIC | Age: 75
End: 2025-08-14
Payer: COMMERCIAL

## 2025-08-14 DIAGNOSIS — R26.2 DIFFICULTY WALKING: ICD-10-CM

## 2025-08-14 DIAGNOSIS — M79.605 ACUTE LEG PAIN, LEFT: ICD-10-CM

## 2025-08-14 DIAGNOSIS — M62.81 MUSCLE WEAKNESS ON EXAMINATION: ICD-10-CM

## 2025-08-14 DIAGNOSIS — D48.0 GIANT CELL TUMOR OF BONE: ICD-10-CM

## 2025-08-14 PROCEDURE — 97140 MANUAL THERAPY 1/> REGIONS: CPT | Mod: GP

## 2025-08-14 PROCEDURE — 97110 THERAPEUTIC EXERCISES: CPT | Mod: GP

## 2025-08-19 ENCOUNTER — TREATMENT (OUTPATIENT)
Dept: PHYSICAL THERAPY | Facility: CLINIC | Age: 75
End: 2025-08-19
Payer: COMMERCIAL

## 2025-08-19 DIAGNOSIS — M79.605 ACUTE LEG PAIN, LEFT: ICD-10-CM

## 2025-08-19 DIAGNOSIS — R26.2 DIFFICULTY WALKING: ICD-10-CM

## 2025-08-19 DIAGNOSIS — M62.81 MUSCLE WEAKNESS ON EXAMINATION: ICD-10-CM

## 2025-08-19 DIAGNOSIS — D48.0 GIANT CELL TUMOR OF BONE: ICD-10-CM

## 2025-08-19 PROCEDURE — 97140 MANUAL THERAPY 1/> REGIONS: CPT | Mod: GP

## 2025-08-19 PROCEDURE — 97110 THERAPEUTIC EXERCISES: CPT | Mod: GP

## 2025-08-26 ENCOUNTER — TREATMENT (OUTPATIENT)
Dept: PHYSICAL THERAPY | Facility: CLINIC | Age: 75
End: 2025-08-26
Payer: COMMERCIAL

## 2025-08-26 DIAGNOSIS — M62.81 MUSCLE WEAKNESS ON EXAMINATION: ICD-10-CM

## 2025-08-26 DIAGNOSIS — D48.0 GIANT CELL TUMOR OF BONE: ICD-10-CM

## 2025-08-26 DIAGNOSIS — M79.605 ACUTE LEG PAIN, LEFT: ICD-10-CM

## 2025-08-26 DIAGNOSIS — R26.2 DIFFICULTY WALKING: ICD-10-CM

## 2025-08-26 PROCEDURE — 97110 THERAPEUTIC EXERCISES: CPT | Mod: GP

## 2025-08-26 PROCEDURE — 97140 MANUAL THERAPY 1/> REGIONS: CPT | Mod: GP

## 2025-08-28 ENCOUNTER — TREATMENT (OUTPATIENT)
Dept: PHYSICAL THERAPY | Facility: CLINIC | Age: 75
End: 2025-08-28
Payer: COMMERCIAL

## 2025-08-28 DIAGNOSIS — D48.0 GIANT CELL TUMOR OF BONE: ICD-10-CM

## 2025-08-28 DIAGNOSIS — M79.605 ACUTE LEG PAIN, LEFT: ICD-10-CM

## 2025-08-28 DIAGNOSIS — M62.81 MUSCLE WEAKNESS ON EXAMINATION: ICD-10-CM

## 2025-08-28 DIAGNOSIS — R26.2 DIFFICULTY WALKING: ICD-10-CM

## 2025-08-28 PROCEDURE — 97140 MANUAL THERAPY 1/> REGIONS: CPT | Mod: GP

## 2025-08-28 PROCEDURE — 97110 THERAPEUTIC EXERCISES: CPT | Mod: GP

## 2025-09-03 ENCOUNTER — APPOINTMENT (OUTPATIENT)
Dept: ORTHOPEDIC SURGERY | Facility: CLINIC | Age: 75
End: 2025-09-03
Payer: COMMERCIAL

## 2025-09-04 ENCOUNTER — TREATMENT (OUTPATIENT)
Dept: PHYSICAL THERAPY | Facility: CLINIC | Age: 75
End: 2025-09-04
Payer: COMMERCIAL

## 2025-09-04 DIAGNOSIS — M79.605 ACUTE LEG PAIN, LEFT: ICD-10-CM

## 2025-09-04 DIAGNOSIS — M62.81 MUSCLE WEAKNESS ON EXAMINATION: ICD-10-CM

## 2025-09-04 DIAGNOSIS — S72.90XA CLOSED FRACTURE OF FEMUR, UNSPECIFIED FRACTURE MORPHOLOGY, UNSPECIFIED LATERALITY, UNSPECIFIED PORTION OF FEMUR, INITIAL ENCOUNTER (MULTI): ICD-10-CM

## 2025-09-04 DIAGNOSIS — R26.2 DIFFICULTY WALKING: ICD-10-CM

## 2025-09-04 PROCEDURE — 97110 THERAPEUTIC EXERCISES: CPT | Mod: GP

## 2025-09-04 PROCEDURE — 97140 MANUAL THERAPY 1/> REGIONS: CPT | Mod: GP

## 2025-11-24 ENCOUNTER — APPOINTMENT (OUTPATIENT)
Dept: PRIMARY CARE | Facility: CLINIC | Age: 75
End: 2025-11-24
Payer: COMMERCIAL

## (undated) DEVICE — STOCKINETTE, IMPERVIOUS, 12 X 48 IN, LF, STERILE

## (undated) DEVICE — IRRIGATION SET, Y, LARGE BORE

## (undated) DEVICE — WOUND SYSTEM, DEBRIDEMENT & CLEANING, O.R DUOPAK

## (undated) DEVICE — APPLICATION KIT, ADVANCED CEMENT MIXING/BIO-PREP CEMENT

## (undated) DEVICE — SEALER, BIPOLAR, AQUA MANTYS 6.0

## (undated) DEVICE — DRESSING, GAUZE, SPONGE, KERLIX, SUPER, 6 X 6.75 IN, STERILE 10PK

## (undated) DEVICE — PADDING, WEBRIL, UNDERCAST, STERILE, 6 IN

## (undated) DEVICE — DRESSING, NON-ADHERENT, TELFA, OUCHLESS, 3 X 6 IN, STERILE

## (undated) DEVICE — Device

## (undated) DEVICE — SUTURE, MONOCRYL, 3-0, 18 IN, PS2, UNDYED

## (undated) DEVICE — DRAPE, 136.5 X 84 IN, SPLIT W/ADH, REINFORCED

## (undated) DEVICE — SUTURE, MONOCRYL, 4-0, 18 IN, PS2, UNDYED

## (undated) DEVICE — BANDAGE, COFLEX, 6 X 5 YDS, FOAM TAN, STERILE, LF

## (undated) DEVICE — GUIDEWIRE, 2.5 X 200

## (undated) DEVICE — SUTURE, VICRYL, 2-0, 27 IN, FSL, UNDYED

## (undated) DEVICE — CONTAINER, SPECIMEN, 120 ML, STERILE

## (undated) DEVICE — DRAPE, TOWEL, STERI DRAPE, 17 X 11 IN, PLASTIC, STERILE

## (undated) DEVICE — DRAPE, SHEET, U, STERI DRAPE, 47 X 51 IN, DISPOSABLE, STERILE

## (undated) DEVICE — BANDAGE, COFLEX, 4 X 5 YDS, TAN, STERILE, LF

## (undated) DEVICE — PAD, GROUNDING, ELECTROSURGICAL, DUAL

## (undated) DEVICE — SUTURE, VICRYL, 0, 18 IN, CT-1, VIOLET

## (undated) DEVICE — CLAMP, DRAPE/TUBE, DISPOSABLE, NS

## (undated) DEVICE — COLLECTION/DELIVERY SYSTEM, COPAN ESWAB, REG SIZE SWAB

## (undated) DEVICE — KIT, MINOR, DOUBLE BASIN

## (undated) DEVICE — CLIP, LIGATING, HORIZON, MEDIUM, TITANIUM

## (undated) DEVICE — BANDAGE, GAUZE, CONFORMING, KERLIX, 6 PLY, 4.5 IN X 4.1 YD

## (undated) DEVICE — MANIFOLD, 4 PORT NEPTUNE STANDARD

## (undated) DEVICE — ADHESIVE, SKIN, DERMABOND ADVANCED, 15CM, PEN-STYLE

## (undated) DEVICE — DRILL, SYNTHES 4.3 X 300 CALIBRATED

## (undated) DEVICE — SUTURE, VICRYL PLUS, 2-0, 27IN, PSL, UND, BRAIDED

## (undated) DEVICE — BANDAGE, ELASTIC, ACE, ACE, DOUBLE LENGTH, 6 X 550 IN, LF

## (undated) DEVICE — SUTURE, VICRYL 0, TAPER POINT, CT-1 VIOLET 27 INCH

## (undated) DEVICE — BLADE, SAW, SAGITTAL, DUAL CUT, 18 X 90 X 1.27

## (undated) DEVICE — BOWL, MIXING, W/SPATULA, DISPOSABLE

## (undated) DEVICE — IMMOBILIZER, KNEE, POST OP, SUPER LITE, W/STRAIGHT STAYS, LARGE, 20 IN

## (undated) DEVICE — WAX, BONE, 2.5 GM

## (undated) DEVICE — CATHETER TRAY, SURESTEP, 16FR, URINE METER W/STATLOCK

## (undated) DEVICE — COVER, CART, 45 X 27 X 48 IN, CLEAR

## (undated) DEVICE — HIGH FLOW TIP FOR INTERPULSE HANDPIECE SET

## (undated) DEVICE — DRILL TIP, 2.5MM, GUIDE WIRE, 300MM

## (undated) DEVICE — MIXER, CEMENT, MIXEVAC III HIGH VACUUM KIT, STERILE

## (undated) DEVICE — DRILL, SYNTHES 3.2 X 300 CALIBRATED

## (undated) DEVICE — CLIP, LIGATING, HORIZON, LARGE, TITANIUM

## (undated) DEVICE — DRAPE, TIBURON, SPLIT SHEET, REINF ADH STRIP, 77X122

## (undated) DEVICE — CUFF, TOURNIQUET, 30 X 4, SNGL PORT/SNGL BLADDER, DISP, LF

## (undated) DEVICE — BIT, DRILL, QUICK-COUPLING, 4.3 X 180 MM

## (undated) DEVICE — HANDPIECE, ABC, SINGLE FUNCTION, MALLEABLE, W/CORD, BEND-A-BEAM, 3 IN, LF

## (undated) DEVICE — COVER, TABLE, 44 X 75 IN, DISPOSABLE, LF, STERILE

## (undated) DEVICE — HOOK, CEMENT, BLUE

## (undated) DEVICE — DRESSING, NON-ADHERENT, TELFA, OUCHLESS, 3 X 8 IN, STERILE

## (undated) DEVICE — DRESSING, MEPILEX BORDER, POST-OP AG, 4 X 10 IN

## (undated) DEVICE — TRAY, MINOR, SINGLE BASIN, STERILE

## (undated) DEVICE — DRAPE, SHEET, U, W/ADHESIVE STRIP, IMPERVIOUS, 60 X 70 IN, DISPOSABLE, LF, STERILE

## (undated) DEVICE — SUTURE, PDS II, 4-0, 18 IN, PS2, CLEAR

## (undated) DEVICE — BANDAGE, ELASTIC, MATRIX, SELF-CLOSURE, 4 IN X 5 YD, LF

## (undated) DEVICE — BANDAGE, ELASTIC, MATRIX, SELF-CLOSURE, 6 IN X 5 YD, LF